# Patient Record
Sex: FEMALE | Race: WHITE | Employment: FULL TIME | URBAN - METROPOLITAN AREA
[De-identification: names, ages, dates, MRNs, and addresses within clinical notes are randomized per-mention and may not be internally consistent; named-entity substitution may affect disease eponyms.]

---

## 2017-01-05 ENCOUNTER — HOSPITAL ENCOUNTER (EMERGENCY)
Facility: HOSPITAL | Age: 27
Discharge: HOME | End: 2017-01-05
Payer: COMMERCIAL

## 2017-01-05 ENCOUNTER — OFFICE VISIT (OUTPATIENT)
Dept: FAMILY MEDICINE CLINIC | Facility: CLINIC | Age: 27
End: 2017-01-05

## 2017-01-05 ENCOUNTER — HOSPITAL ENCOUNTER (EMERGENCY)
Facility: HOSPITAL | Age: 27
Discharge: HOME | End: 2017-01-06
Attending: EMERGENCY MEDICINE
Payer: COMMERCIAL

## 2017-01-05 VITALS
TEMPERATURE: 98 F | OXYGEN SATURATION: 98 % | DIASTOLIC BLOOD PRESSURE: 78 MMHG | HEART RATE: 92 BPM | SYSTOLIC BLOOD PRESSURE: 122 MMHG | RESPIRATION RATE: 16 BRPM

## 2017-01-05 DIAGNOSIS — G43.009 MIGRAINE WITHOUT AURA AND WITHOUT STATUS MIGRAINOSUS, NOT INTRACTABLE: Primary | ICD-10-CM

## 2017-01-05 DIAGNOSIS — R51.9 INTRACTABLE HEADACHE, UNSPECIFIED CHRONICITY PATTERN, UNSPECIFIED HEADACHE TYPE: Primary | ICD-10-CM

## 2017-01-05 DIAGNOSIS — F41.9 ANXIETY: ICD-10-CM

## 2017-01-05 LAB
B-HCG UR QL: NEGATIVE
BILIRUB UR QL: NEGATIVE
COLOR UR: YELLOW
GLUCOSE UR-MCNC: NEGATIVE MG/DL
HGB UR QL STRIP.AUTO: NEGATIVE
KETONES UR-MCNC: NEGATIVE MG/DL
NITRITE UR QL STRIP.AUTO: NEGATIVE
PH UR: 6 [PH] (ref 5–8)
PROT UR-MCNC: NEGATIVE MG/DL
RBC #/AREA URNS AUTO: 1 /HPF
SP GR UR STRIP: 1.02 (ref 1–1.03)
UROBILINOGEN UR STRIP-ACNC: <2
VIT C UR-MCNC: 40 MG/DL
WBC #/AREA URNS AUTO: 1 /HPF

## 2017-01-05 PROCEDURE — 96375 TX/PRO/DX INJ NEW DRUG ADDON: CPT

## 2017-01-05 PROCEDURE — 81025 URINE PREGNANCY TEST: CPT

## 2017-01-05 PROCEDURE — 96374 THER/PROPH/DIAG INJ IV PUSH: CPT

## 2017-01-05 PROCEDURE — 81001 URINALYSIS AUTO W/SCOPE: CPT | Performed by: EMERGENCY MEDICINE

## 2017-01-05 PROCEDURE — 99285 EMERGENCY DEPT VISIT HI MDM: CPT

## 2017-01-05 PROCEDURE — 96361 HYDRATE IV INFUSION ADD-ON: CPT

## 2017-01-05 RX ORDER — DEXAMETHASONE SODIUM PHOSPHATE 10 MG/ML
10 INJECTION, SOLUTION INTRAMUSCULAR; INTRAVENOUS ONCE
Status: COMPLETED | OUTPATIENT
Start: 2017-01-05 | End: 2017-01-05

## 2017-01-05 RX ORDER — DIPHENHYDRAMINE HYDROCHLORIDE 50 MG/ML
25 INJECTION INTRAMUSCULAR; INTRAVENOUS ONCE
Status: COMPLETED | OUTPATIENT
Start: 2017-01-05 | End: 2017-01-05

## 2017-01-05 RX ORDER — KETOROLAC TROMETHAMINE 30 MG/ML
30 INJECTION, SOLUTION INTRAMUSCULAR; INTRAVENOUS ONCE
Status: COMPLETED | OUTPATIENT
Start: 2017-01-05 | End: 2017-01-05

## 2017-01-05 RX ORDER — METOCLOPRAMIDE HYDROCHLORIDE 5 MG/ML
10 INJECTION INTRAMUSCULAR; INTRAVENOUS ONCE
Status: COMPLETED | OUTPATIENT
Start: 2017-01-05 | End: 2017-01-05

## 2017-01-05 NOTE — ED NOTES
Patient updated and vitals rechecked. Patient states her headache remains the same at this time and has no further complaints. Made aware to let triage know if things change.

## 2017-01-05 NOTE — PROGRESS NOTES
Patient, Fredrick Mace , is a 23year old female who presented today in clinic with headache. Went to ED on 12/26/16 and headache resumed on 1/3/7. PT reports headache in frontal and occipital areas. No weakness or vision changes.  PT does report lig

## 2017-01-05 NOTE — ED INITIAL ASSESSMENT (HPI)
Pt reports having a HA for 22 days.  Pt states she had a ct scan which was negative and went to immediate care and sent here for a poss MRI

## 2017-01-06 ENCOUNTER — OFFICE VISIT (OUTPATIENT)
Dept: INTERNAL MEDICINE CLINIC | Facility: CLINIC | Age: 27
End: 2017-01-06

## 2017-01-06 VITALS
HEART RATE: 82 BPM | BODY MASS INDEX: 34.58 KG/M2 | DIASTOLIC BLOOD PRESSURE: 76 MMHG | SYSTOLIC BLOOD PRESSURE: 122 MMHG | OXYGEN SATURATION: 98 % | HEIGHT: 71 IN | WEIGHT: 247 LBS

## 2017-01-06 VITALS
WEIGHT: 242 LBS | SYSTOLIC BLOOD PRESSURE: 126 MMHG | TEMPERATURE: 98 F | DIASTOLIC BLOOD PRESSURE: 76 MMHG | BODY MASS INDEX: 32.78 KG/M2 | OXYGEN SATURATION: 99 % | HEART RATE: 78 BPM | HEIGHT: 72 IN | RESPIRATION RATE: 18 BRPM

## 2017-01-06 DIAGNOSIS — G44.229 CHRONIC TENSION-TYPE HEADACHE, NOT INTRACTABLE: Primary | ICD-10-CM

## 2017-01-06 DIAGNOSIS — F41.1 GENERALIZED ANXIETY DISORDER: ICD-10-CM

## 2017-01-06 PROCEDURE — 99213 OFFICE O/P EST LOW 20 MIN: CPT | Performed by: FAMILY MEDICINE

## 2017-01-06 RX ORDER — LORAZEPAM 1 MG/1
1 TABLET ORAL 2 TIMES DAILY PRN
Qty: 30 TABLET | Refills: 0 | Status: SHIPPED | OUTPATIENT
Start: 2017-01-06 | End: 2017-01-30

## 2017-01-06 RX ORDER — CETIRIZINE HYDROCHLORIDE 10 MG/1
TABLET ORAL
COMMUNITY
End: 2017-01-06 | Stop reason: ALTCHOICE

## 2017-01-06 RX ORDER — ATENOLOL 25 MG/1
25 TABLET ORAL 2 TIMES DAILY
Qty: 60 TABLET | Refills: 1 | Status: SHIPPED | OUTPATIENT
Start: 2017-01-06 | End: 2017-01-30

## 2017-01-06 NOTE — PROGRESS NOTES
CC:  Headache and Anxiety      Hx of CC:  RECURRENT SEVERE OCCIPITAL AND FRONTAL HEADACHES X 1 MONTHS WITH ANXIETY. HAD CT OF BRAIN AT [place] ON 12/26/16 WHICH WAS NORMAL. 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     Vitals

## 2017-01-06 NOTE — ED PROVIDER NOTES
Patient Seen in: [facility] AND [facility] Emergency Department    History   Patient presents with:  Headache (neurologic)    Stated Complaint: headache    HPI    23year old Female complains of headache   Location:  throbbing pain, global.  Quality: throbbing hydrochlorothiazide 25 MG Oral Tab,  TAKE 1 TABLET (25 MG TOTAL) BY MOUTH DAILY. Pantoprazole Sodium 40 MG Oral Tab EC,  Take 40 mg by mouth every morning before breakfast.   Probiotic Product (PROBIOTIC DAILY OR),  Take by mouth.    Multiple Vitamins-Min normal.   Mouth/Throat: Mucous membranes are normal. Mucous membranes are not pale and not cyanotic. Eyes: Conjunctivae and lids are normal. Right conjunctiva is not injected. Left conjunctiva is not injected. No scleral icterus.  Pupils are equal.   Neck 9194)         Procedures: None    Re-Evaluation: 1 AM, patient reports headache markedly improved after ED treatment and is comfortable going home     01/05/17  1701 01/05/17  1744 01/05/17  2218 01/05/17  2256   BP: 142/72 135/78 122/69 129/66   Pulse: 80 personally discussed the results of the above ED workup and a number of associated acute management issues with the patient, and I explained the need for further follow-up evaluation and treatment.     Risk:  Patient is at High risk of significant complicat

## 2017-01-09 ENCOUNTER — HOSPITAL ENCOUNTER (EMERGENCY)
Facility: HOSPITAL | Age: 27
Discharge: HOME | End: 2017-01-09
Attending: EMERGENCY MEDICINE
Payer: COMMERCIAL

## 2017-01-09 ENCOUNTER — TELEPHONE (OUTPATIENT)
Dept: GASTROENTEROLOGY | Facility: CLINIC | Age: 27
End: 2017-01-09

## 2017-01-09 ENCOUNTER — APPOINTMENT (OUTPATIENT)
Dept: CT IMAGING | Facility: HOSPITAL | Age: 27
End: 2017-01-09
Attending: EMERGENCY MEDICINE
Payer: COMMERCIAL

## 2017-01-09 VITALS
HEART RATE: 57 BPM | OXYGEN SATURATION: 100 % | WEIGHT: 243 LBS | BODY MASS INDEX: 32.91 KG/M2 | RESPIRATION RATE: 20 BRPM | TEMPERATURE: 99 F | HEIGHT: 72 IN | SYSTOLIC BLOOD PRESSURE: 114 MMHG | DIASTOLIC BLOOD PRESSURE: 62 MMHG

## 2017-01-09 DIAGNOSIS — R10.13 ABDOMINAL PAIN, EPIGASTRIC: Primary | ICD-10-CM

## 2017-01-09 LAB
ALBUMIN SERPL BCP-MCNC: 4.2 G/DL (ref 3.5–4.8)
ALP SERPL-CCNC: 44 U/L (ref 32–100)
ALT SERPL-CCNC: 23 U/L (ref 14–54)
ANION GAP SERPL CALC-SCNC: 11 MMOL/L (ref 0–18)
AST SERPL-CCNC: 25 U/L (ref 15–41)
B-HCG UR QL: NEGATIVE
BASOPHILS # BLD: 0 K/UL (ref 0–0.2)
BASOPHILS NFR BLD: 0 %
BILIRUB DIRECT SERPL-MCNC: 0.1 MG/DL (ref 0–0.2)
BILIRUB SERPL-MCNC: 0.3 MG/DL (ref 0.3–1.2)
BILIRUB UR QL: NEGATIVE
BUN SERPL-MCNC: 23 MG/DL (ref 8–20)
BUN/CREAT SERPL: 24.5 (ref 10–20)
CALCIUM SERPL-MCNC: 9.2 MG/DL (ref 8.5–10.5)
CHLORIDE SERPL-SCNC: 106 MMOL/L (ref 95–110)
CO2 SERPL-SCNC: 25 MMOL/L (ref 22–32)
COLOR UR: YELLOW
CREAT SERPL-MCNC: 0.94 MG/DL (ref 0.5–1.5)
EOSINOPHIL # BLD: 0.1 K/UL (ref 0–0.7)
EOSINOPHIL NFR BLD: 1 %
ERYTHROCYTE [DISTWIDTH] IN BLOOD BY AUTOMATED COUNT: 13.6 % (ref 11–15)
GLUCOSE SERPL-MCNC: 106 MG/DL (ref 70–99)
GLUCOSE UR-MCNC: NEGATIVE MG/DL
HCT VFR BLD AUTO: 41.1 % (ref 35–48)
HGB BLD-MCNC: 13.9 G/DL (ref 12–16)
KETONES UR-MCNC: NEGATIVE MG/DL
LIPASE SERPL-CCNC: 55 U/L (ref 22–51)
LYMPHOCYTES # BLD: 4.2 K/UL (ref 1–4)
LYMPHOCYTES NFR BLD: 47 %
MCH RBC QN AUTO: 29.9 PG (ref 27–32)
MCHC RBC AUTO-ENTMCNC: 33.8 G/DL (ref 32–37)
MCV RBC AUTO: 88.5 FL (ref 80–100)
MONOCYTES # BLD: 0.4 K/UL (ref 0–1)
MONOCYTES NFR BLD: 4 %
NEUTROPHILS # BLD AUTO: 4.3 K/UL (ref 1.8–7.7)
NEUTROPHILS NFR BLD: 48 %
NITRITE UR QL STRIP.AUTO: NEGATIVE
OSMOLALITY UR CALC.SUM OF ELEC: 298 MOSM/KG (ref 275–295)
PH UR: 6 [PH] (ref 5–8)
PLATELET # BLD AUTO: 198 K/UL (ref 140–400)
PMV BLD AUTO: 10.5 FL (ref 7.4–10.3)
POTASSIUM SERPL-SCNC: 3.3 MMOL/L (ref 3.3–5.1)
PROT SERPL-MCNC: 6.8 G/DL (ref 5.9–8.4)
PROT UR-MCNC: NEGATIVE MG/DL
RBC # BLD AUTO: 4.64 M/UL (ref 3.7–5.4)
RBC #/AREA URNS AUTO: 14 /HPF
SODIUM SERPL-SCNC: 142 MMOL/L (ref 136–144)
SP GR UR STRIP: 1.02 (ref 1–1.03)
UROBILINOGEN UR STRIP-ACNC: <2
VIT C UR-MCNC: 40 MG/DL
WBC # BLD AUTO: 9.1 K/UL (ref 4–11)
WBC #/AREA URNS AUTO: 7 /HPF

## 2017-01-09 PROCEDURE — 96375 TX/PRO/DX INJ NEW DRUG ADDON: CPT

## 2017-01-09 PROCEDURE — 80076 HEPATIC FUNCTION PANEL: CPT | Performed by: EMERGENCY MEDICINE

## 2017-01-09 PROCEDURE — 80048 BASIC METABOLIC PNL TOTAL CA: CPT | Performed by: EMERGENCY MEDICINE

## 2017-01-09 PROCEDURE — 96374 THER/PROPH/DIAG INJ IV PUSH: CPT

## 2017-01-09 PROCEDURE — 81001 URINALYSIS AUTO W/SCOPE: CPT | Performed by: EMERGENCY MEDICINE

## 2017-01-09 PROCEDURE — 74177 CT ABD & PELVIS W/CONTRAST: CPT

## 2017-01-09 PROCEDURE — 85025 COMPLETE CBC W/AUTO DIFF WBC: CPT | Performed by: EMERGENCY MEDICINE

## 2017-01-09 PROCEDURE — S0028 INJECTION, FAMOTIDINE, 20 MG: HCPCS | Performed by: EMERGENCY MEDICINE

## 2017-01-09 PROCEDURE — 87086 URINE CULTURE/COLONY COUNT: CPT | Performed by: EMERGENCY MEDICINE

## 2017-01-09 PROCEDURE — 99284 EMERGENCY DEPT VISIT MOD MDM: CPT

## 2017-01-09 PROCEDURE — 83690 ASSAY OF LIPASE: CPT | Performed by: EMERGENCY MEDICINE

## 2017-01-09 PROCEDURE — 96361 HYDRATE IV INFUSION ADD-ON: CPT

## 2017-01-09 PROCEDURE — 81025 URINE PREGNANCY TEST: CPT

## 2017-01-09 RX ORDER — SODIUM CHLORIDE 9 MG/ML
INJECTION, SOLUTION INTRAVENOUS ONCE
Status: COMPLETED | OUTPATIENT
Start: 2017-01-09 | End: 2017-01-09

## 2017-01-09 RX ORDER — LORAZEPAM 2 MG/ML
0.5 INJECTION INTRAMUSCULAR ONCE
Status: COMPLETED | OUTPATIENT
Start: 2017-01-09 | End: 2017-01-09

## 2017-01-09 RX ORDER — ONDANSETRON 2 MG/ML
4 INJECTION INTRAMUSCULAR; INTRAVENOUS ONCE
Status: COMPLETED | OUTPATIENT
Start: 2017-01-09 | End: 2017-01-09

## 2017-01-09 RX ORDER — FAMOTIDINE 10 MG/ML
20 INJECTION, SOLUTION INTRAVENOUS ONCE
Status: COMPLETED | OUTPATIENT
Start: 2017-01-09 | End: 2017-01-09

## 2017-01-09 RX ORDER — HYDROMORPHONE HYDROCHLORIDE 1 MG/ML
0.5 INJECTION, SOLUTION INTRAMUSCULAR; INTRAVENOUS; SUBCUTANEOUS EVERY 30 MIN PRN
Status: DISCONTINUED | OUTPATIENT
Start: 2017-01-09 | End: 2017-01-09

## 2017-01-09 NOTE — TELEPHONE ENCOUNTER
Pt states that she went to ER early today due to stomach pain. Pt was vomiting also. She was told that she needs to have EGD due to acid in stomach. Pt had CT done and also labs.   Pt already has EGD scheduledfor 1/10/17 and was told to call this office

## 2017-01-09 NOTE — ED INITIAL ASSESSMENT (HPI)
Pt states about 15 minutes PTA she suddenly had severe abdominal pain with associated shortness of breath. Pt was here not too long ago for the same.

## 2017-01-09 NOTE — ED PROVIDER NOTES
Patient Seen in: [facility] AND [facility] Emergency Department    History   Patient presents with:  Abdominal Pain    Stated Complaint: Abdominal pain    HPI    She presents with severe abdominal pain that came on just before coming in.   She reports that it sta Cream,  Use qhs for   Fluticasone Propionate (FLONASE) 50 MCG/ACT Nasal Suspension,  2 sprays by Each Nare route daily. cetirizine (ZYRTEC) 10 MG Oral Tab,  Take 10 mg by mouth daily.        Family History   Problem Relation Age of Onset   • Kidney Diseas murmur, regular, with good peripheral perfusion. Respiratory:  Lungs clear to auscultation bilaterally with good effort. No wheezes, ronchi, or rales. Abdomen:  Soft, she is tender across the upper abdomen in the epigastric area no rebound no guarding. Lymphocyte Absolute 4.2 (*)     All other components within normal limits   HEPATIC FUNCTION PANEL (7) - Normal   EMH POCT PREGNANCY URINE - Normal   CBC WITH DIFFERENTIAL WITH PLATELET    Narrative:      The following orders were created for panel order

## 2017-01-09 NOTE — TELEPHONE ENCOUNTER
I reviewed CT scan with radiology and looked at lab work.    Plan  - EGD tomorrow  - possible MRCP of bile ducts    RN to inform pt

## 2017-01-10 ENCOUNTER — OFFICE VISIT (OUTPATIENT)
Dept: NUTRITION/OBESITY MEDICINE | Facility: HOSPITAL | Age: 27
End: 2017-01-10
Attending: SURGERY
Payer: COMMERCIAL

## 2017-01-10 ENCOUNTER — LAB REQUISITION (OUTPATIENT)
Dept: LAB | Facility: HOSPITAL | Age: 27
End: 2017-01-10
Payer: COMMERCIAL

## 2017-01-10 VITALS
OXYGEN SATURATION: 97 % | HEART RATE: 61 BPM | SYSTOLIC BLOOD PRESSURE: 112 MMHG | BODY MASS INDEX: 34.21 KG/M2 | HEIGHT: 71 IN | WEIGHT: 244.38 LBS | DIASTOLIC BLOOD PRESSURE: 67 MMHG

## 2017-01-10 DIAGNOSIS — Z01.89 ENCOUNTER FOR OTHER SPECIFIED SPECIAL EXAMINATIONS: ICD-10-CM

## 2017-01-10 PROCEDURE — 88305 TISSUE EXAM BY PATHOLOGIST: CPT | Performed by: INTERNAL MEDICINE

## 2017-01-10 PROCEDURE — 88312 SPECIAL STAINS GROUP 1: CPT | Performed by: INTERNAL MEDICINE

## 2017-01-10 NOTE — PROGRESS NOTES
[address] [place] [address] AND WEIGHT LOSS CLINIC  [address] [place] [address]  Dept: [phone]  Loc: [phone]    1/10/2017    BARIATRIC EXISTING PATIENT/FOLLOW UP    HPI:  Thony Magaña is a 23year old-year old Plan for EGD and MRCP today. Patient will follow up with me in 1 month.     Mima Bourne MD  1/10/2017

## 2017-01-11 ENCOUNTER — TELEPHONE (OUTPATIENT)
Dept: GASTROENTEROLOGY | Facility: CLINIC | Age: 27
End: 2017-01-11

## 2017-01-11 DIAGNOSIS — R10.13 EPIGASTRIC PAIN: Primary | ICD-10-CM

## 2017-01-11 DIAGNOSIS — R93.3 ABNORMAL CT SCAN, GASTROINTESTINAL TRACT: ICD-10-CM

## 2017-01-11 NOTE — TELEPHONE ENCOUNTER
Pt returned my call and reviewed below. [facility] will work on approval for [facility] and will call her once authorized. Will call once pathology is back    Pt states there was discussion of increasing her pantoprazole to 40mg BID?  Please advise

## 2017-01-11 NOTE — TELEPHONE ENCOUNTER
RN to contact pt  - EGD results pending, may have samples back tonight  - I did discuss with Dr. Neisha Temple MRCP  - continue on PPI and carafate

## 2017-01-16 ENCOUNTER — TELEPHONE (OUTPATIENT)
Dept: INTERNAL MEDICINE CLINIC | Facility: CLINIC | Age: 27
End: 2017-01-16

## 2017-01-16 DIAGNOSIS — G43.919 INTRACTABLE MIGRAINE, UNSPECIFIED MIGRAINE TYPE: Primary | ICD-10-CM

## 2017-01-16 RX ORDER — SUMATRIPTAN 25 MG/1
TABLET, FILM COATED ORAL
COMMUNITY
End: 2017-01-30

## 2017-01-16 RX ORDER — FLUTICASONE PROPIONATE 50 MCG
SPRAY, SUSPENSION (ML) NASAL
COMMUNITY
End: 2017-01-30

## 2017-01-16 RX ORDER — SUCRALFATE ORAL 1 G/10ML
SUSPENSION ORAL
COMMUNITY
End: 2017-02-10

## 2017-01-16 RX ORDER — BUTALBITAL, ACETAMINOPHEN AND CAFFEINE 50; 325; 40 MG/1; MG/1; MG/1
TABLET ORAL
COMMUNITY
End: 2017-01-30

## 2017-01-16 RX ORDER — CYCLOBENZAPRINE HCL 10 MG
TABLET ORAL
COMMUNITY
End: 2017-01-30

## 2017-01-16 RX ORDER — PANTOPRAZOLE SODIUM 40 MG/1
40 GRANULE, DELAYED RELEASE ORAL 2 TIMES DAILY
COMMUNITY
End: 2017-05-25 | Stop reason: ALTCHOICE

## 2017-01-16 RX ORDER — TOPIRAMATE 25 MG/1
TABLET ORAL
COMMUNITY
End: 2017-01-30

## 2017-01-16 RX ORDER — CETIRIZINE HYDROCHLORIDE 10 MG/1
TABLET ORAL
COMMUNITY
End: 2017-01-30

## 2017-01-16 RX ORDER — HYDROCHLOROTHIAZIDE 25 MG/1
TABLET ORAL
COMMUNITY
End: 2017-01-20

## 2017-01-17 ENCOUNTER — OFFICE VISIT (OUTPATIENT)
Dept: NUTRITION/OBESITY MEDICINE | Facility: HOSPITAL | Age: 27
End: 2017-01-17
Attending: INTERNAL MEDICINE
Payer: COMMERCIAL

## 2017-01-17 VITALS
OXYGEN SATURATION: 99 % | HEART RATE: 99 BPM | HEIGHT: 71 IN | RESPIRATION RATE: 18 BRPM | SYSTOLIC BLOOD PRESSURE: 114 MMHG | BODY MASS INDEX: 34.66 KG/M2 | DIASTOLIC BLOOD PRESSURE: 59 MMHG | WEIGHT: 247.56 LBS

## 2017-01-17 DIAGNOSIS — Z98.890 S/P GASTROPLASTY: ICD-10-CM

## 2017-01-17 DIAGNOSIS — E87.8 ELECTROLYTE IMBALANCE: ICD-10-CM

## 2017-01-17 DIAGNOSIS — E66.9 OBESITY (BMI 30-39.9): ICD-10-CM

## 2017-01-17 DIAGNOSIS — E78.1 HYPERTRIGLYCERIDEMIA: ICD-10-CM

## 2017-01-17 DIAGNOSIS — R60.0 BILATERAL EDEMA OF LOWER EXTREMITY: Primary | ICD-10-CM

## 2017-01-17 DIAGNOSIS — G44.84 PRIMARY EXERTIONAL HEADACHE: ICD-10-CM

## 2017-01-17 PROCEDURE — 99213 OFFICE O/P EST LOW 20 MIN: CPT | Performed by: INTERNAL MEDICINE

## 2017-01-17 NOTE — TELEPHONE ENCOUNTER
Spoke patient the lorazepam does work for a short period of time but not complete relief, provided the info for Dr Chuy Cedeño

## 2017-01-17 NOTE — PROGRESS NOTES
[address] [place] [address] AND WEIGHT LOSS CLINIC  [address] [place] [address]  Dept: [phone]  Loc: [phone]    Date: 10/13/2016    Patient:  Haja Pham  :      6/15/1990  MRN:      [id number]    Glenna times daily as needed for Anxiety (OR SLEEP). , Disp: 30 tablet, Rfl: 0  •  sucralfate 1 GM/10ML Oral Suspension, Take 1 g by mouth 4 (four) times daily before meals and nightly., Disp: , Rfl:   •  hydrochlorothiazide 25 MG Oral Tab, TAKE 1 TABLET (25 MG TO CVA tenderness.   Lungs: diminished breath sounds bibasilar  Heart: S1, S2 normal, no murmur, click, rub or gallop, regular rate and rhythm  Abdomen: soft, incisions are healed well. large pannus noted  Extremities: extremities normal, atraumatic, no cyanos

## 2017-01-19 ENCOUNTER — TELEPHONE (OUTPATIENT)
Dept: NUTRITION/OBESITY MEDICINE | Facility: HOSPITAL | Age: 27
End: 2017-01-19

## 2017-01-20 RX ORDER — HYDROCHLOROTHIAZIDE 25 MG/1
25 TABLET ORAL DAILY
Qty: 30 TABLET | Refills: 1 | Status: SHIPPED | OUTPATIENT
Start: 2017-01-20 | End: 2017-01-30

## 2017-01-24 ENCOUNTER — HOSPITAL ENCOUNTER (OUTPATIENT)
Dept: MRI IMAGING | Facility: HOSPITAL | Age: 27
Discharge: HOME | End: 2017-01-24
Attending: INTERNAL MEDICINE
Payer: COMMERCIAL

## 2017-01-24 ENCOUNTER — TELEPHONE (OUTPATIENT)
Dept: INTERNAL MEDICINE CLINIC | Facility: CLINIC | Age: 27
End: 2017-01-24

## 2017-01-24 DIAGNOSIS — R10.13 EPIGASTRIC PAIN: ICD-10-CM

## 2017-01-24 DIAGNOSIS — R93.3 ABNORMAL CT SCAN, GASTROINTESTINAL TRACT: ICD-10-CM

## 2017-01-24 PROCEDURE — A9575 INJ GADOTERATE MEGLUMI 0.1ML: HCPCS | Performed by: INTERNAL MEDICINE

## 2017-01-24 PROCEDURE — 74183 MRI ABD W/O CNTR FLWD CNTR: CPT

## 2017-01-24 RX ORDER — HYDROCHLOROTHIAZIDE 25 MG/1
TABLET ORAL
Qty: 30 TABLET | Refills: 1 | Status: SHIPPED | OUTPATIENT
Start: 2017-01-24 | End: 2017-03-18

## 2017-01-24 RX ORDER — ONDANSETRON 4 MG/1
4 TABLET, ORALLY DISINTEGRATING ORAL EVERY 8 HOURS PRN
Qty: 20 TABLET | Refills: 0 | Status: SHIPPED | OUTPATIENT
Start: 2017-01-24 | End: 2017-01-30

## 2017-01-25 ENCOUNTER — TELEPHONE (OUTPATIENT)
Dept: GASTROENTEROLOGY | Facility: CLINIC | Age: 27
End: 2017-01-25

## 2017-01-25 NOTE — TELEPHONE ENCOUNTER
Dr. Arnulfo Damon - Pls see below. I spoke with pt; had MRI done yesterday. Pt c/o: nausea, diarrhea and \"feeling like she has been hit by a truck (aches)\". States it happened shortly after MRI and ONLY received IV contrast (not oral). Denies fever.    Pt stat

## 2017-01-25 NOTE — TELEPHONE ENCOUNTER
The patient was contacted, she did undergo the MRI yesterday. She reports feeling nauseous and had some retching episodes after the scan. She denies any rash, shortness of breath hives swelling or other issues.   She has started on Zofran for the symptoms

## 2017-01-25 NOTE — TELEPHONE ENCOUNTER
Phoned pharm and increased pantoprazole to 40mg BID. Phoned pt and scheduled follow up OV for Friday 2.10.17 at 3:00pm (pt unable to make sooner time).      GI RNs - Please call chelle to open slot for Friday 2.10.17 at 3:00pm for 15 min (follow up - M

## 2017-01-25 NOTE — TELEPHONE ENCOUNTER
Pt called, having nausea and diarrhea after MRI done today with contrast. States has had problems like this before with contrast. No trouble breathing/ hives or swelling. Will rx zofran to the pharmacy. Also to take benadryl.  If any swelling/ trouble breat

## 2017-01-25 NOTE — TELEPHONE ENCOUNTER
Pt has nausea since having scan with contrast done yesterday - asking if it is normal to get sick from the contrast

## 2017-02-08 ENCOUNTER — HOSPITAL ENCOUNTER (OUTPATIENT)
Dept: MRI IMAGING | Facility: HOSPITAL | Age: 27
Discharge: HOME | End: 2017-02-08
Attending: Other
Payer: COMMERCIAL

## 2017-02-08 DIAGNOSIS — M54.2 CERVICALGIA: ICD-10-CM

## 2017-02-08 DIAGNOSIS — G44.52 NEW PERSISTENT DAILY HEADACHE: ICD-10-CM

## 2017-02-08 PROCEDURE — 70553 MRI BRAIN STEM W/O & W/DYE: CPT

## 2017-02-08 PROCEDURE — 72141 MRI NECK SPINE W/O DYE: CPT

## 2017-02-08 PROCEDURE — A9575 INJ GADOTERATE MEGLUMI 0.1ML: HCPCS | Performed by: OTHER

## 2017-02-09 NOTE — PROGRESS NOTES
Quick Note:    Mild neural foraminal stenosis at C6-7 on the left. Mild syrinx at C7. These findings may or may not be clinically significant.  There is spinal cord itself appears to otherwise be normal.  ______

## 2017-02-09 NOTE — PROGRESS NOTES
Quick Note:    Normal MRI brain. Evidence of sinus inflammation. The patient made benefit from returning to her primary care physician or seeing the ear nose and throat doctors.   ______

## 2017-02-10 ENCOUNTER — OFFICE VISIT (OUTPATIENT)
Dept: GASTROENTEROLOGY | Facility: CLINIC | Age: 27
End: 2017-02-10

## 2017-02-10 ENCOUNTER — TELEPHONE (OUTPATIENT)
Dept: GASTROENTEROLOGY | Facility: CLINIC | Age: 27
End: 2017-02-10

## 2017-02-10 VITALS
HEIGHT: 71.75 IN | BODY MASS INDEX: 33.52 KG/M2 | WEIGHT: 244.81 LBS | HEART RATE: 68 BPM | SYSTOLIC BLOOD PRESSURE: 110 MMHG | DIASTOLIC BLOOD PRESSURE: 69 MMHG

## 2017-02-10 DIAGNOSIS — R93.5 ABNORMAL FINDINGS ON DIAGNOSTIC IMAGING OF ABDOMEN: ICD-10-CM

## 2017-02-10 DIAGNOSIS — R10.9 ABDOMINAL PAIN, UNSPECIFIED LOCATION: Primary | ICD-10-CM

## 2017-02-10 PROCEDURE — 99213 OFFICE O/P EST LOW 20 MIN: CPT | Performed by: INTERNAL MEDICINE

## 2017-02-10 PROCEDURE — 99212 OFFICE O/P EST SF 10 MIN: CPT | Performed by: INTERNAL MEDICINE

## 2017-02-10 NOTE — PATIENT INSTRUCTIONS
Bile duct dilation-   - Evaluation by Dr. Jim Pritchett for possible EUS  - consider sphincter of Oddi or passage of small sludge/stones

## 2017-02-11 ENCOUNTER — TELEPHONE (OUTPATIENT)
Dept: INTERNAL MEDICINE CLINIC | Facility: CLINIC | Age: 27
End: 2017-02-11

## 2017-02-14 RX ORDER — SUCRALFATE 1 G/10ML
SUSPENSION ORAL
Qty: 1200 ML | Refills: 2 | OUTPATIENT
Start: 2017-02-14

## 2017-02-14 NOTE — TELEPHONE ENCOUNTER
Pending Prescriptions Disp Refills    CARAFATE 1 GM/10ML Oral Suspension [Pharmacy Med Name: CARAFATE 1 GM/10 ML SUSP] 1200 mL 2     Sig: TAKE 10ML BY MOUTH EVERY 6 HOURS         See refill request  Patient last seen 2-10-17.    Medication last refilled 1

## 2017-02-17 ENCOUNTER — OFFICE VISIT (OUTPATIENT)
Dept: OTOLARYNGOLOGY | Facility: CLINIC | Age: 27
End: 2017-02-17

## 2017-02-17 VITALS
TEMPERATURE: 98 F | DIASTOLIC BLOOD PRESSURE: 70 MMHG | HEIGHT: 72 IN | SYSTOLIC BLOOD PRESSURE: 112 MMHG | BODY MASS INDEX: 32.51 KG/M2 | WEIGHT: 240 LBS

## 2017-02-17 DIAGNOSIS — J32.9 CHRONIC SINUSITIS, UNSPECIFIED LOCATION: Primary | ICD-10-CM

## 2017-02-17 PROCEDURE — 99213 OFFICE O/P EST LOW 20 MIN: CPT | Performed by: OTOLARYNGOLOGY

## 2017-02-17 PROCEDURE — 99212 OFFICE O/P EST SF 10 MIN: CPT | Performed by: OTOLARYNGOLOGY

## 2017-02-17 RX ORDER — SUCRALFATE 1 G/10ML
SUSPENSION ORAL
Qty: 1200 ML | Refills: 2 | Status: SHIPPED | OUTPATIENT
Start: 2017-02-17 | End: 2017-07-27

## 2017-02-17 NOTE — PATIENT INSTRUCTIONS
Chronic Sinusitis  Chronic sinusitis is a long-term swelling or infection of the sinuses. If sinusitis lasts more than 12 weeks, it is called chronic. What is chronic sinusitis? Sinuses are air-filled spaces in the skull behind the face.  They are kep · Taking medicines. Your doctor may prescribe medicines to reduce the amount of mucus and swelling. These help unblock the sinuses and allow them to drain. You will need to take antibiotics if you have a bacterial infection. · Flushing your sinuses.  Your

## 2017-02-17 NOTE — PROGRESS NOTES
Bambi Maxwell is a 23year old female. Patient presents with:  Sinus Problem: sinus headache since december 2016, had MRI brain on 2/17    HPI:   She's been experiencing back headaches and back of her head and the front.  This is been going on for the las chills, sweats, weight loss, weight gain  SKIN: denies any unusual skin lesions or rashes  RESPIRATORY: denies shortness of breath with exertion  NEURO: denies headaches    EXAM:   /70 mmHg  Temp(Src) 97.6 °F (36.4 °C) (Tympanic)  Ht 6' (1.829 m)  [employer]

## 2017-02-20 ENCOUNTER — OFFICE VISIT (OUTPATIENT)
Dept: SURGERY | Facility: CLINIC | Age: 27
End: 2017-02-20
Attending: INTERNAL MEDICINE

## 2017-02-20 VITALS
BODY MASS INDEX: 32.4 KG/M2 | WEIGHT: 239.25 LBS | HEART RATE: 78 BPM | DIASTOLIC BLOOD PRESSURE: 77 MMHG | HEIGHT: 72 IN | SYSTOLIC BLOOD PRESSURE: 121 MMHG | RESPIRATION RATE: 18 BRPM

## 2017-02-20 DIAGNOSIS — E66.9 OBESITY (BMI 30-39.9): ICD-10-CM

## 2017-02-20 DIAGNOSIS — E87.8 ELECTROLYTE IMBALANCE: ICD-10-CM

## 2017-02-20 DIAGNOSIS — Z98.890 S/P GASTROPLASTY: ICD-10-CM

## 2017-02-20 DIAGNOSIS — K21.00 GASTROESOPHAGEAL REFLUX DISEASE WITH ESOPHAGITIS: ICD-10-CM

## 2017-02-20 DIAGNOSIS — G44.84 PRIMARY EXERTIONAL HEADACHE: Primary | ICD-10-CM

## 2017-02-20 DIAGNOSIS — F43.9 STRESS: ICD-10-CM

## 2017-02-20 PROBLEM — K21.9 GERD (GASTROESOPHAGEAL REFLUX DISEASE): Status: ACTIVE | Noted: 2017-02-20

## 2017-02-20 PROCEDURE — 99213 OFFICE O/P EST LOW 20 MIN: CPT | Performed by: INTERNAL MEDICINE

## 2017-02-20 RX ORDER — BUPROPION HYDROCHLORIDE 100 MG/1
100 TABLET ORAL 2 TIMES DAILY
Qty: 60 TABLET | Refills: 2 | Status: SHIPPED | OUTPATIENT
Start: 2017-02-20 | End: 2017-06-14

## 2017-02-20 NOTE — PROGRESS NOTES
[facility] AND WEIGHT LOSS CLINIC  [address] [place] [address]  Dept: [phone]  Loc: [phone]    Date: 10/13/2016    Patient:  Akash Brown  :      6/15/1990  MRN:      [id number]    Eleazar WATTS by Each Nare route daily. , Disp: 1 Inhaler, Rfl: 1  •  cetirizine (ZYRTEC) 10 MG Oral Tab, Take 10 mg by mouth daily. , Disp: , Rfl:     Allergies:  Dust; Keflex;  Lexapro; Penicillins; Sulfa Antibiotics     Social History:    Smoking Status: Never Smoker diagnosis)  Gastroesophageal reflux disease with esophagitis  Electrolyte imbalance  S/p gastroplasty  Obesity (bmi 30-39. 9)  Stress    Plan     S/P VSG  Feels weak      Headache x several weeks      Continue water and protein intake    Back to work    Kasey Duval

## 2017-02-22 NOTE — PROGRESS NOTES
Ramu Knight is a 23year old female. HPI:   Patient presents with: Follow - Up      The patient follows up in the office today. Fortunately the severe attacks have been fairly limited.   She describes severe abdominal pain in the upper abdomen epig Each Nare route daily. Disp: 1 Inhaler Rfl: 1   cetirizine (ZYRTEC) 10 MG Oral Tab Take 10 mg by mouth daily. Disp:  Rfl:    BuPROPion HCl (WELLBUTRIN) 100 MG Oral Tab Take 1 tablet (100 mg total) by mouth 2 (two) times daily.  Disp: 60 tablet Rfl: 2   FRANCES prescriptions requested or ordered in this encounter    Imaging & Referrals:  None     2/22/2017  Clark Jacome.  Abril Nicole MD

## 2017-02-27 ENCOUNTER — TELEPHONE (OUTPATIENT)
Dept: OTOLARYNGOLOGY | Facility: CLINIC | Age: 27
End: 2017-02-27

## 2017-02-27 NOTE — TELEPHONE ENCOUNTER
Pt states antibiotic prescribed on 02/17 has not helped with her headache, pt requesting to speak to RN. Pls call thank you.

## 2017-02-27 NOTE — TELEPHONE ENCOUNTER
pt seen 02/17 for chronic sinusitis and headaches. Pt prescribed clarithromycin for 14 days, pt using Flonase daily and pt brought neti pot which she is using daily but her symptoms have worsened.  Per pt still having headaches, sinus pressure fron

## 2017-03-07 ENCOUNTER — TELEPHONE (OUTPATIENT)
Dept: SURGERY | Facility: CLINIC | Age: 27
End: 2017-03-07

## 2017-03-09 ENCOUNTER — ANESTHESIA EVENT (OUTPATIENT)
Dept: ENDOSCOPY | Facility: HOSPITAL | Age: 27
End: 2017-03-09
Payer: COMMERCIAL

## 2017-03-09 ENCOUNTER — ANESTHESIA (OUTPATIENT)
Dept: ENDOSCOPY | Facility: HOSPITAL | Age: 27
End: 2017-03-09
Payer: COMMERCIAL

## 2017-03-09 ENCOUNTER — HOSPITAL ENCOUNTER (OUTPATIENT)
Facility: HOSPITAL | Age: 27
Setting detail: HOSPITAL OUTPATIENT SURGERY
Discharge: HOME | End: 2017-03-09
Attending: INTERNAL MEDICINE | Admitting: INTERNAL MEDICINE
Payer: COMMERCIAL

## 2017-03-09 ENCOUNTER — SURGERY (OUTPATIENT)
Age: 27
End: 2017-03-09

## 2017-03-09 VITALS
OXYGEN SATURATION: 100 % | BODY MASS INDEX: 31.15 KG/M2 | HEIGHT: 72 IN | WEIGHT: 230 LBS | HEART RATE: 58 BPM | DIASTOLIC BLOOD PRESSURE: 47 MMHG | RESPIRATION RATE: 14 BRPM | SYSTOLIC BLOOD PRESSURE: 108 MMHG

## 2017-03-09 DIAGNOSIS — K83.8 DILATED BILE DUCT: ICD-10-CM

## 2017-03-09 DIAGNOSIS — K80.20 CALCULUS OF GALLBLADDER WITHOUT CHOLECYSTITIS WITHOUT OBSTRUCTION: Primary | ICD-10-CM

## 2017-03-09 LAB — B-HCG UR QL: NEGATIVE

## 2017-03-09 PROCEDURE — 88305 TISSUE EXAM BY PATHOLOGIST: CPT | Performed by: INTERNAL MEDICINE

## 2017-03-09 PROCEDURE — 0DJ08ZZ INSPECTION OF UPPER INTESTINAL TRACT, VIA NATURAL OR ARTIFICIAL OPENING ENDOSCOPIC: ICD-10-PCS | Performed by: INTERNAL MEDICINE

## 2017-03-09 PROCEDURE — 88312 SPECIAL STAINS GROUP 1: CPT | Performed by: INTERNAL MEDICINE

## 2017-03-09 PROCEDURE — 81025 URINE PREGNANCY TEST: CPT

## 2017-03-09 PROCEDURE — 0DB98ZX EXCISION OF DUODENUM, VIA NATURAL OR ARTIFICIAL OPENING ENDOSCOPIC, DIAGNOSTIC: ICD-10-PCS | Performed by: INTERNAL MEDICINE

## 2017-03-09 RX ORDER — NALOXONE HYDROCHLORIDE 0.4 MG/ML
80 INJECTION, SOLUTION INTRAMUSCULAR; INTRAVENOUS; SUBCUTANEOUS AS NEEDED
Status: DISCONTINUED | OUTPATIENT
Start: 2017-03-09 | End: 2017-03-09

## 2017-03-09 RX ORDER — MORPHINE SULFATE 2 MG/ML
2 INJECTION, SOLUTION INTRAMUSCULAR; INTRAVENOUS EVERY 10 MIN PRN
Status: DISCONTINUED | OUTPATIENT
Start: 2017-03-09 | End: 2017-03-09

## 2017-03-09 RX ORDER — MORPHINE SULFATE 10 MG/ML
6 INJECTION, SOLUTION INTRAMUSCULAR; INTRAVENOUS EVERY 10 MIN PRN
Status: DISCONTINUED | OUTPATIENT
Start: 2017-03-09 | End: 2017-03-09

## 2017-03-09 RX ORDER — HYDROCODONE BITARTRATE AND ACETAMINOPHEN 5; 325 MG/1; MG/1
1 TABLET ORAL AS NEEDED
Status: DISCONTINUED | OUTPATIENT
Start: 2017-03-09 | End: 2017-03-09

## 2017-03-09 RX ORDER — SODIUM CHLORIDE, SODIUM LACTATE, POTASSIUM CHLORIDE, CALCIUM CHLORIDE 600; 310; 30; 20 MG/100ML; MG/100ML; MG/100ML; MG/100ML
INJECTION, SOLUTION INTRAVENOUS CONTINUOUS
Status: DISCONTINUED | OUTPATIENT
Start: 2017-03-09 | End: 2017-03-09

## 2017-03-09 RX ORDER — HYDROMORPHONE HYDROCHLORIDE 1 MG/ML
0.6 INJECTION, SOLUTION INTRAMUSCULAR; INTRAVENOUS; SUBCUTANEOUS EVERY 5 MIN PRN
Status: DISCONTINUED | OUTPATIENT
Start: 2017-03-09 | End: 2017-03-09

## 2017-03-09 RX ORDER — HYDROCODONE BITARTRATE AND ACETAMINOPHEN 5; 325 MG/1; MG/1
2 TABLET ORAL AS NEEDED
Status: DISCONTINUED | OUTPATIENT
Start: 2017-03-09 | End: 2017-03-09

## 2017-03-09 RX ORDER — ONDANSETRON 2 MG/ML
INJECTION INTRAMUSCULAR; INTRAVENOUS AS NEEDED
Status: DISCONTINUED | OUTPATIENT
Start: 2017-03-09 | End: 2017-03-09 | Stop reason: SURG

## 2017-03-09 RX ORDER — HYDROMORPHONE HYDROCHLORIDE 1 MG/ML
0.4 INJECTION, SOLUTION INTRAMUSCULAR; INTRAVENOUS; SUBCUTANEOUS EVERY 5 MIN PRN
Status: DISCONTINUED | OUTPATIENT
Start: 2017-03-09 | End: 2017-03-09

## 2017-03-09 RX ORDER — LIDOCAINE HYDROCHLORIDE 10 MG/ML
INJECTION, SOLUTION EPIDURAL; INFILTRATION; INTRACAUDAL; PERINEURAL AS NEEDED
Status: DISCONTINUED | OUTPATIENT
Start: 2017-03-09 | End: 2017-03-09 | Stop reason: SURG

## 2017-03-09 RX ORDER — MORPHINE SULFATE 4 MG/ML
4 INJECTION, SOLUTION INTRAMUSCULAR; INTRAVENOUS EVERY 10 MIN PRN
Status: DISCONTINUED | OUTPATIENT
Start: 2017-03-09 | End: 2017-03-09

## 2017-03-09 RX ORDER — ONDANSETRON 2 MG/ML
4 INJECTION INTRAMUSCULAR; INTRAVENOUS ONCE AS NEEDED
Status: DISCONTINUED | OUTPATIENT
Start: 2017-03-09 | End: 2017-03-09

## 2017-03-09 RX ORDER — HALOPERIDOL 5 MG/ML
0.25 INJECTION INTRAMUSCULAR ONCE AS NEEDED
Status: DISCONTINUED | OUTPATIENT
Start: 2017-03-09 | End: 2017-03-09

## 2017-03-09 RX ORDER — SODIUM CHLORIDE, SODIUM LACTATE, POTASSIUM CHLORIDE, CALCIUM CHLORIDE 600; 310; 30; 20 MG/100ML; MG/100ML; MG/100ML; MG/100ML
INJECTION, SOLUTION INTRAVENOUS CONTINUOUS PRN
Status: DISCONTINUED | OUTPATIENT
Start: 2017-03-09 | End: 2017-03-09 | Stop reason: SURG

## 2017-03-09 RX ORDER — HYDROMORPHONE HYDROCHLORIDE 1 MG/ML
0.2 INJECTION, SOLUTION INTRAMUSCULAR; INTRAVENOUS; SUBCUTANEOUS EVERY 5 MIN PRN
Status: DISCONTINUED | OUTPATIENT
Start: 2017-03-09 | End: 2017-03-09

## 2017-03-09 RX ADMIN — SODIUM CHLORIDE, SODIUM LACTATE, POTASSIUM CHLORIDE, CALCIUM CHLORIDE: 600; 310; 30; 20 INJECTION, SOLUTION INTRAVENOUS at 15:29:00

## 2017-03-09 RX ADMIN — SODIUM CHLORIDE, SODIUM LACTATE, POTASSIUM CHLORIDE, CALCIUM CHLORIDE: 600; 310; 30; 20 INJECTION, SOLUTION INTRAVENOUS at 14:55:00

## 2017-03-09 RX ADMIN — LIDOCAINE HYDROCHLORIDE 50 MG: 10 INJECTION, SOLUTION EPIDURAL; INFILTRATION; INTRACAUDAL; PERINEURAL at 14:59:00

## 2017-03-09 RX ADMIN — ONDANSETRON 4 MG: 2 INJECTION INTRAMUSCULAR; INTRAVENOUS at 15:28:00

## 2017-03-09 NOTE — ANESTHESIA POSTPROCEDURE EVALUATION
Patient: Zetta Floor    Procedure Summary     Date Anesthesia Start Anesthesia Stop Room / Location    03/09/17 3125 3436 [facility] ENDOSCOPY 01 / [facility] ENDOSCOPY       Procedure Diagnosis Surgeon Responsible Provider    ENDOSCOPIC ULTRASOUND (EUS) (N/A ); ESO

## 2017-03-09 NOTE — ANESTHESIA PREPROCEDURE EVALUATION
Anesthesia PreOp Note    HPI:     Ibeth Avalos is a 23year old female who presents for preoperative consultation requested by: Bonifacio Rubin MD    Date of Surgery: 3/9/2017    Procedure(s):  ENDOSCOPIC ULTRASOUND (EUS)  ESOPHAGOGASTRODUODENOSCOPY (E Prescriptions prior to admission:  BuPROPion HCl (WELLBUTRIN) 100 MG Oral Tab Take 1 tablet (100 mg total) by mouth 2 (two) times daily.  Disp: 60 tablet Rfl: 2 3/9/2017   CARAFATE 1 GM/10ML Oral Suspension TAKE 10ML BY MOUTH EVERY 6 HOURS Disp: 120 Activity: Yes    Partners: Male     Other Topics Concern    Pt has a pacemaker No    Pt has a defibrillator No    Reaction to local anesthetic No     Social History Narrative       Available pre-op labs reviewed.     Lab Results  Component Value Date   WBC the anesthetic management as planned.   Megan Gomes  3/9/2017 2:31 PM

## 2017-03-09 NOTE — BRIEF OP NOTE
[facility] ENDOSCOPY LAB SUITES  Brief Op Note     Peg Goring Location: OR   CSN [id number] MRN [id number]   Admission Date 3/9/2017 Operation Date 3/9/2017   Attending Physician Judd Reilly MD Operating Physician Parviz Méndez MD       P

## 2017-03-09 NOTE — OPERATIVE REPORT
[facility]    PATIENT'S NAME: Lanialexandria Bob   ATTENDING PHYSICIAN: Freda Ireland MD   OPERATING PHYSICIAN: Freda Ireland MD   PATIENT ACCOUNT#:   [id number]    LOCATION:  [facility] ROOM 2 [facility] 10  MEDICAL RECORD #:   [id number] duodenum. Exam of the esophageal mucosa did not reveal any evidence of inflammation or ulceration. The squamocolumnar junction was at approximately 39.5 cm from incisors.   The diaphragmatic impression could not be assessed completely; however, it might h

## 2017-03-11 ENCOUNTER — OFFICE VISIT (OUTPATIENT)
Dept: OTOLARYNGOLOGY | Facility: CLINIC | Age: 27
End: 2017-03-11

## 2017-03-11 VITALS
DIASTOLIC BLOOD PRESSURE: 80 MMHG | WEIGHT: 233 LBS | BODY MASS INDEX: 32 KG/M2 | SYSTOLIC BLOOD PRESSURE: 110 MMHG | TEMPERATURE: 97 F

## 2017-03-11 DIAGNOSIS — J32.9 CHRONIC SINUSITIS, UNSPECIFIED LOCATION: Primary | ICD-10-CM

## 2017-03-11 PROCEDURE — 99213 OFFICE O/P EST LOW 20 MIN: CPT | Performed by: OTOLARYNGOLOGY

## 2017-03-11 PROCEDURE — 99212 OFFICE O/P EST SF 10 MIN: CPT | Performed by: OTOLARYNGOLOGY

## 2017-03-11 NOTE — PROGRESS NOTES
Graham Yanes is a 23year old female. Patient presents with:  Sinusitis: Pt experience headaches, and sinus pressure getting worst.    HPI:   She's had no real improvement with her courses of medications.      Current Outpatient Prescriptions:  BuPROPion °F (36.2 °C)  Wt 233 lb (105.688 kg)  System Findings Details   Skin Normal Inspection - Normal.   Constitutional Normal Overall appearance - Normal.   Head/Face Normal Facial features - Normal. Eyebrows - Normal. Skull - Normal.   Oral/Oropharynx Normal L

## 2017-03-15 ENCOUNTER — TELEPHONE (OUTPATIENT)
Dept: OTOLARYNGOLOGY | Facility: CLINIC | Age: 27
End: 2017-03-15

## 2017-03-15 NOTE — PROGRESS NOTES
Quick Note:    3/14/2017  [address]    Dear Philipp Pulliam,       Here are the biopsy/pathology findings from your recent EGD (Upper Endoscopy) and endoscopic ultrasoundk:  1. Gallstones  2.  Dilated bile duct without e

## 2017-03-15 NOTE — TELEPHONE ENCOUNTER
[facility], [phone], spoke with Lyric Roberto, a prior authorization is not needed for CT sinus, reference number [id number].  Called secondary insurance, Mark, [phone], spoke with Zoe Miller, case started, case number [id number], clinicals faxed to

## 2017-03-20 ENCOUNTER — HOSPITAL ENCOUNTER (OUTPATIENT)
Dept: ULTRASOUND IMAGING | Facility: HOSPITAL | Age: 27
Discharge: HOME | End: 2017-03-20
Attending: UROLOGY
Payer: COMMERCIAL

## 2017-03-20 DIAGNOSIS — N20.0 KIDNEY STONES: ICD-10-CM

## 2017-03-20 PROCEDURE — 76770 US EXAM ABDO BACK WALL COMP: CPT

## 2017-03-20 NOTE — TELEPHONE ENCOUNTER
LMTCB-Per pt insurance conventry, pt prior authorization approved [id number] and expires 04/15/17, at [facility].

## 2017-03-22 ENCOUNTER — HOSPITAL ENCOUNTER (OUTPATIENT)
Dept: CT IMAGING | Facility: HOSPITAL | Age: 27
Discharge: HOME | End: 2017-03-22
Attending: OTOLARYNGOLOGY
Payer: COMMERCIAL

## 2017-03-22 DIAGNOSIS — J32.9 CHRONIC SINUSITIS, UNSPECIFIED LOCATION: ICD-10-CM

## 2017-03-22 PROCEDURE — 70486 CT MAXILLOFACIAL W/O DYE: CPT

## 2017-03-22 RX ORDER — HYDROCHLOROTHIAZIDE 25 MG/1
TABLET ORAL
Qty: 30 TABLET | Refills: 1 | Status: SHIPPED | OUTPATIENT
Start: 2017-03-22 | End: 2017-05-15

## 2017-03-24 ENCOUNTER — TELEPHONE (OUTPATIENT)
Dept: OTOLARYNGOLOGY | Facility: CLINIC | Age: 27
End: 2017-03-24

## 2017-04-01 ENCOUNTER — TELEPHONE (OUTPATIENT)
Dept: INTERNAL MEDICINE CLINIC | Facility: CLINIC | Age: 27
End: 2017-04-01

## 2017-04-01 ENCOUNTER — HOSPITAL ENCOUNTER (EMERGENCY)
Facility: HOSPITAL | Age: 27
Discharge: HOME | End: 2017-04-01
Attending: EMERGENCY MEDICINE
Payer: COMMERCIAL

## 2017-04-01 ENCOUNTER — APPOINTMENT (OUTPATIENT)
Dept: CT IMAGING | Facility: HOSPITAL | Age: 27
End: 2017-04-01
Attending: EMERGENCY MEDICINE
Payer: COMMERCIAL

## 2017-04-01 ENCOUNTER — APPOINTMENT (OUTPATIENT)
Dept: ULTRASOUND IMAGING | Facility: HOSPITAL | Age: 27
End: 2017-04-01
Attending: EMERGENCY MEDICINE
Payer: COMMERCIAL

## 2017-04-01 VITALS
RESPIRATION RATE: 18 BRPM | HEART RATE: 55 BPM | DIASTOLIC BLOOD PRESSURE: 48 MMHG | BODY MASS INDEX: 31.29 KG/M2 | OXYGEN SATURATION: 98 % | WEIGHT: 231 LBS | TEMPERATURE: 99 F | HEIGHT: 72 IN | SYSTOLIC BLOOD PRESSURE: 104 MMHG

## 2017-04-01 DIAGNOSIS — K80.50 BILIARY COLIC: ICD-10-CM

## 2017-04-01 DIAGNOSIS — N30.00 ACUTE CYSTITIS WITHOUT HEMATURIA: Primary | ICD-10-CM

## 2017-04-01 PROCEDURE — 87086 URINE CULTURE/COLONY COUNT: CPT

## 2017-04-01 PROCEDURE — 76705 ECHO EXAM OF ABDOMEN: CPT

## 2017-04-01 PROCEDURE — 96375 TX/PRO/DX INJ NEW DRUG ADDON: CPT

## 2017-04-01 PROCEDURE — 80076 HEPATIC FUNCTION PANEL: CPT | Performed by: EMERGENCY MEDICINE

## 2017-04-01 PROCEDURE — 80048 BASIC METABOLIC PNL TOTAL CA: CPT | Performed by: EMERGENCY MEDICINE

## 2017-04-01 PROCEDURE — 96376 TX/PRO/DX INJ SAME DRUG ADON: CPT

## 2017-04-01 PROCEDURE — 96361 HYDRATE IV INFUSION ADD-ON: CPT

## 2017-04-01 PROCEDURE — 85025 COMPLETE CBC W/AUTO DIFF WBC: CPT | Performed by: EMERGENCY MEDICINE

## 2017-04-01 PROCEDURE — 81025 URINE PREGNANCY TEST: CPT

## 2017-04-01 PROCEDURE — 83690 ASSAY OF LIPASE: CPT | Performed by: EMERGENCY MEDICINE

## 2017-04-01 PROCEDURE — 81001 URINALYSIS AUTO W/SCOPE: CPT

## 2017-04-01 PROCEDURE — 74176 CT ABD & PELVIS W/O CONTRAST: CPT

## 2017-04-01 PROCEDURE — 99285 EMERGENCY DEPT VISIT HI MDM: CPT

## 2017-04-01 PROCEDURE — 96365 THER/PROPH/DIAG IV INF INIT: CPT

## 2017-04-01 RX ORDER — MORPHINE SULFATE 4 MG/ML
4 INJECTION, SOLUTION INTRAMUSCULAR; INTRAVENOUS ONCE
Status: COMPLETED | OUTPATIENT
Start: 2017-04-01 | End: 2017-04-01

## 2017-04-01 RX ORDER — HYDROCODONE BITARTRATE AND ACETAMINOPHEN 7.5; 325 MG/1; MG/1
1 TABLET ORAL EVERY 4 HOURS PRN
Qty: 20 TABLET | Refills: 0 | Status: SHIPPED | OUTPATIENT
Start: 2017-04-01 | End: 2017-04-08

## 2017-04-01 RX ORDER — LEVOFLOXACIN 5 MG/ML
500 INJECTION, SOLUTION INTRAVENOUS ONCE
Status: COMPLETED | OUTPATIENT
Start: 2017-04-01 | End: 2017-04-01

## 2017-04-01 RX ORDER — MORPHINE SULFATE 4 MG/ML
INJECTION, SOLUTION INTRAMUSCULAR; INTRAVENOUS
Status: DISCONTINUED
Start: 2017-04-01 | End: 2017-04-01

## 2017-04-01 RX ORDER — SODIUM CHLORIDE 9 MG/ML
INJECTION, SOLUTION INTRAVENOUS ONCE
Status: COMPLETED | OUTPATIENT
Start: 2017-04-01 | End: 2017-04-01

## 2017-04-01 RX ORDER — LEVOFLOXACIN 500 MG/1
500 TABLET, FILM COATED ORAL DAILY
Qty: 10 TABLET | Refills: 0 | Status: SHIPPED | OUTPATIENT
Start: 2017-04-01 | End: 2017-04-11

## 2017-04-01 RX ORDER — ONDANSETRON 2 MG/ML
4 INJECTION INTRAMUSCULAR; INTRAVENOUS ONCE
Status: COMPLETED | OUTPATIENT
Start: 2017-04-01 | End: 2017-04-01

## 2017-04-01 NOTE — ED PROVIDER NOTES
Patient Seen in: [facility] AND [facility] Emergency Department    History   Patient presents with:  Abdomen/Flank Pain (GI/)    Stated Complaint: RUQ abd pain, +gallstones    HPI    Patient presents the emergency department complaining of right upper quadrant OR),  Take by mouth. Fluticasone Propionate (FLONASE) 50 MCG/ACT Nasal Suspension,  2 sprays by Each Nare route daily. cetirizine (ZYRTEC) 10 MG Oral Tab,  Take 10 mg by mouth daily.        Family History   Problem Relation Age of Onset   • Kidney Disea dry. No rash noted. Nursing note and vitals reviewed.           ED Course     Labs Reviewed   URINALYSIS WITH CULTURE REFLEX - Abnormal; Notable for the following:     Clarity Urine Cloudy (*)     Protein Urine 100  (*)     Blood Urine Moderate (*)     Jian Manzo on Tuesday and follow-up.   Disposition and Plan     Clinical Impression:  Acute cystitis without hematuria  (primary encounter diagnosis)  Biliary colic    Disposition:  Discharge    Follow-up:  Marline Willett MD  [address] [address]

## 2017-04-01 NOTE — ED INITIAL ASSESSMENT (HPI)
Patient complains of pain to RUQ radiating to back since yesterday, pain to R shoulder blade, history of gallstone, states she has nausea after she eats

## 2017-04-01 NOTE — TELEPHONE ENCOUNTER
C/o RUQ pain radiating to to the back lasting > 30 hs with some nausea. Advised to go to Ed. H/o biliary dyskinesia and dilated duct.

## 2017-04-04 ENCOUNTER — OFFICE VISIT (OUTPATIENT)
Dept: SURGERY | Facility: CLINIC | Age: 27
End: 2017-04-04

## 2017-04-04 ENCOUNTER — OFFICE VISIT (OUTPATIENT)
Dept: OTOLARYNGOLOGY | Facility: CLINIC | Age: 27
End: 2017-04-04

## 2017-04-04 VITALS
WEIGHT: 231 LBS | RESPIRATION RATE: 16 BRPM | DIASTOLIC BLOOD PRESSURE: 74 MMHG | HEART RATE: 72 BPM | SYSTOLIC BLOOD PRESSURE: 109 MMHG | BODY MASS INDEX: 31 KG/M2

## 2017-04-04 VITALS
DIASTOLIC BLOOD PRESSURE: 67 MMHG | WEIGHT: 229 LBS | SYSTOLIC BLOOD PRESSURE: 100 MMHG | TEMPERATURE: 98 F | HEIGHT: 71 IN | BODY MASS INDEX: 32.06 KG/M2

## 2017-04-04 DIAGNOSIS — J34.2 DEVIATED SEPTUM: Primary | ICD-10-CM

## 2017-04-04 PROCEDURE — 99213 OFFICE O/P EST LOW 20 MIN: CPT | Performed by: OTOLARYNGOLOGY

## 2017-04-04 PROCEDURE — 99212 OFFICE O/P EST SF 10 MIN: CPT | Performed by: OTOLARYNGOLOGY

## 2017-04-04 NOTE — PROGRESS NOTES
[address], [address] [address]  Dept: [phone]    4/4/2017    BARIATRIC EXISTING PATIENT/FOLLOW UP    HPI:  Akash Brown is a 23year old-year old female who p

## 2017-04-05 NOTE — PROGRESS NOTES
Kirill Leach is a 23year old female. Patient presents with:  Results: CT sinus done on 3-22    HPI:    She is in follow up of a sinus CT. I reviewed the CT scans myself and the findings with her.  CT shows a deviation of her nasal septum to the right mi loss, weight gain  SKIN: denies any unusual skin lesions or rashes  RESPIRATORY: denies shortness of breath with exertion  NEURO: denies headaches    EXAM:   /67 mmHg  Temp(Src) 97.8 °F (36.6 °C) (Oral)  Ht 5' 11\" (1.803 m)  Wt 229 lb (103.874 kg) plan.      Augustus Bryant.  Rubio Zavaleta MD  4/4/2017  10:36 PM

## 2017-04-10 ENCOUNTER — ANESTHESIA (OUTPATIENT)
Dept: SURGERY | Facility: HOSPITAL | Age: 27
End: 2017-04-10
Payer: COMMERCIAL

## 2017-04-10 ENCOUNTER — ANESTHESIA EVENT (OUTPATIENT)
Dept: SURGERY | Facility: HOSPITAL | Age: 27
End: 2017-04-10
Payer: COMMERCIAL

## 2017-04-10 ENCOUNTER — HOSPITAL ENCOUNTER (OUTPATIENT)
Facility: HOSPITAL | Age: 27
Setting detail: HOSPITAL OUTPATIENT SURGERY
Discharge: HOME | End: 2017-04-10
Attending: SURGERY | Admitting: SURGERY
Payer: COMMERCIAL

## 2017-04-10 ENCOUNTER — SURGERY (OUTPATIENT)
Age: 27
End: 2017-04-10

## 2017-04-10 VITALS
SYSTOLIC BLOOD PRESSURE: 140 MMHG | HEIGHT: 72 IN | TEMPERATURE: 98 F | HEART RATE: 86 BPM | DIASTOLIC BLOOD PRESSURE: 78 MMHG | BODY MASS INDEX: 30.34 KG/M2 | RESPIRATION RATE: 16 BRPM | OXYGEN SATURATION: 96 % | WEIGHT: 224 LBS

## 2017-04-10 DIAGNOSIS — K80.20 CALCULUS OF GALLBLADDER WITHOUT CHOLECYSTITIS WITHOUT OBSTRUCTION: Primary | ICD-10-CM

## 2017-04-10 PROCEDURE — 88304 TISSUE EXAM BY PATHOLOGIST: CPT | Performed by: SURGERY

## 2017-04-10 PROCEDURE — S0077 INJECTION, CLINDAMYCIN PHOSP: HCPCS | Performed by: NURSE ANESTHETIST, CERTIFIED REGISTERED

## 2017-04-10 PROCEDURE — 0FT44ZZ RESECTION OF GALLBLADDER, PERCUTANEOUS ENDOSCOPIC APPROACH: ICD-10-PCS | Performed by: SURGERY

## 2017-04-10 PROCEDURE — 81025 URINE PREGNANCY TEST: CPT

## 2017-04-10 PROCEDURE — S0077 INJECTION, CLINDAMYCIN PHOSP: HCPCS

## 2017-04-10 RX ORDER — DEXAMETHASONE SODIUM PHOSPHATE 4 MG/ML
VIAL (ML) INJECTION AS NEEDED
Status: DISCONTINUED | OUTPATIENT
Start: 2017-04-10 | End: 2017-04-10 | Stop reason: SURG

## 2017-04-10 RX ORDER — GLYCOPYRROLATE 0.2 MG/ML
INJECTION INTRAMUSCULAR; INTRAVENOUS AS NEEDED
Status: DISCONTINUED | OUTPATIENT
Start: 2017-04-10 | End: 2017-04-10 | Stop reason: SURG

## 2017-04-10 RX ORDER — KETOROLAC TROMETHAMINE 15 MG/ML
15 INJECTION, SOLUTION INTRAMUSCULAR; INTRAVENOUS EVERY 6 HOURS PRN
Status: DISCONTINUED | OUTPATIENT
Start: 2017-04-10 | End: 2017-04-10

## 2017-04-10 RX ORDER — HYDROCODONE BITARTRATE AND ACETAMINOPHEN 7.5; 325 MG/1; MG/1
1 TABLET ORAL EVERY 6 HOURS PRN
COMMUNITY
End: 2017-04-25 | Stop reason: ALTCHOICE

## 2017-04-10 RX ORDER — SODIUM CHLORIDE, SODIUM LACTATE, POTASSIUM CHLORIDE, CALCIUM CHLORIDE 600; 310; 30; 20 MG/100ML; MG/100ML; MG/100ML; MG/100ML
INJECTION, SOLUTION INTRAVENOUS CONTINUOUS
Status: DISCONTINUED | OUTPATIENT
Start: 2017-04-10 | End: 2017-04-10

## 2017-04-10 RX ORDER — HYDROCODONE BITARTRATE AND ACETAMINOPHEN 5; 325 MG/1; MG/1
2 TABLET ORAL AS NEEDED
Status: DISCONTINUED | OUTPATIENT
Start: 2017-04-10 | End: 2017-04-10

## 2017-04-10 RX ORDER — MORPHINE SULFATE 10 MG/ML
6 INJECTION, SOLUTION INTRAMUSCULAR; INTRAVENOUS EVERY 10 MIN PRN
Status: DISCONTINUED | OUTPATIENT
Start: 2017-04-10 | End: 2017-04-10

## 2017-04-10 RX ORDER — ONDANSETRON 2 MG/ML
4 INJECTION INTRAMUSCULAR; INTRAVENOUS ONCE AS NEEDED
Status: COMPLETED | OUTPATIENT
Start: 2017-04-10 | End: 2017-04-10

## 2017-04-10 RX ORDER — MORPHINE SULFATE 4 MG/ML
4 INJECTION, SOLUTION INTRAMUSCULAR; INTRAVENOUS EVERY 10 MIN PRN
Status: DISCONTINUED | OUTPATIENT
Start: 2017-04-10 | End: 2017-04-10

## 2017-04-10 RX ORDER — HYDROMORPHONE HYDROCHLORIDE 1 MG/ML
INJECTION, SOLUTION INTRAMUSCULAR; INTRAVENOUS; SUBCUTANEOUS AS NEEDED
Status: DISCONTINUED | OUTPATIENT
Start: 2017-04-10 | End: 2017-04-10 | Stop reason: SURG

## 2017-04-10 RX ORDER — HYDROCODONE BITARTRATE AND ACETAMINOPHEN 5; 325 MG/1; MG/1
2 TABLET ORAL EVERY 4 HOURS PRN
Status: DISCONTINUED | OUTPATIENT
Start: 2017-04-10 | End: 2017-04-10

## 2017-04-10 RX ORDER — SODIUM CHLORIDE, SODIUM LACTATE, POTASSIUM CHLORIDE, CALCIUM CHLORIDE 600; 310; 30; 20 MG/100ML; MG/100ML; MG/100ML; MG/100ML
INJECTION, SOLUTION INTRAVENOUS CONTINUOUS PRN
Status: DISCONTINUED | OUTPATIENT
Start: 2017-04-10 | End: 2017-04-10 | Stop reason: SURG

## 2017-04-10 RX ORDER — MAGNESIUM HYDROXIDE 1200 MG/15ML
LIQUID ORAL CONTINUOUS PRN
Status: DISCONTINUED | OUTPATIENT
Start: 2017-04-10 | End: 2017-04-10

## 2017-04-10 RX ORDER — DIPHENHYDRAMINE HYDROCHLORIDE 50 MG/ML
INJECTION INTRAMUSCULAR; INTRAVENOUS AS NEEDED
Status: DISCONTINUED | OUTPATIENT
Start: 2017-04-10 | End: 2017-04-10 | Stop reason: SURG

## 2017-04-10 RX ORDER — MORPHINE SULFATE 2 MG/ML
2 INJECTION, SOLUTION INTRAMUSCULAR; INTRAVENOUS EVERY 10 MIN PRN
Status: DISCONTINUED | OUTPATIENT
Start: 2017-04-10 | End: 2017-04-10

## 2017-04-10 RX ORDER — FAMOTIDINE 20 MG/1
20 TABLET ORAL ONCE
Status: COMPLETED | OUTPATIENT
Start: 2017-04-10 | End: 2017-04-10

## 2017-04-10 RX ORDER — MEPERIDINE HYDROCHLORIDE 50 MG/ML
25 INJECTION INTRAMUSCULAR; INTRAVENOUS; SUBCUTANEOUS ONCE
Status: COMPLETED | OUTPATIENT
Start: 2017-04-10 | End: 2017-04-10

## 2017-04-10 RX ORDER — OXYCODONE HYDROCHLORIDE AND ACETAMINOPHEN 5; 325 MG/1; MG/1
1 TABLET ORAL EVERY 4 HOURS PRN
COMMUNITY
End: 2017-04-25 | Stop reason: ALTCHOICE

## 2017-04-10 RX ORDER — HYDROMORPHONE HYDROCHLORIDE 1 MG/ML
0.6 INJECTION, SOLUTION INTRAMUSCULAR; INTRAVENOUS; SUBCUTANEOUS EVERY 5 MIN PRN
Status: DISCONTINUED | OUTPATIENT
Start: 2017-04-10 | End: 2017-04-10

## 2017-04-10 RX ORDER — ESMOLOL HYDROCHLORIDE 10 MG/ML
INJECTION INTRAVENOUS AS NEEDED
Status: DISCONTINUED | OUTPATIENT
Start: 2017-04-10 | End: 2017-04-10 | Stop reason: SURG

## 2017-04-10 RX ORDER — MIDAZOLAM HYDROCHLORIDE 1 MG/ML
INJECTION INTRAMUSCULAR; INTRAVENOUS AS NEEDED
Status: DISCONTINUED | OUTPATIENT
Start: 2017-04-10 | End: 2017-04-10 | Stop reason: SURG

## 2017-04-10 RX ORDER — ONDANSETRON 2 MG/ML
INJECTION INTRAMUSCULAR; INTRAVENOUS AS NEEDED
Status: DISCONTINUED | OUTPATIENT
Start: 2017-04-10 | End: 2017-04-10 | Stop reason: SURG

## 2017-04-10 RX ORDER — ACETAMINOPHEN 325 MG/1
650 TABLET ORAL ONCE
Status: DISCONTINUED | OUTPATIENT
Start: 2017-04-10 | End: 2017-04-10 | Stop reason: HOSPADM

## 2017-04-10 RX ORDER — MORPHINE SULFATE 2 MG/ML
2 INJECTION, SOLUTION INTRAMUSCULAR; INTRAVENOUS EVERY 2 HOUR PRN
Status: DISCONTINUED | OUTPATIENT
Start: 2017-04-10 | End: 2017-04-10

## 2017-04-10 RX ORDER — HYDROMORPHONE HYDROCHLORIDE 1 MG/ML
0.2 INJECTION, SOLUTION INTRAMUSCULAR; INTRAVENOUS; SUBCUTANEOUS EVERY 5 MIN PRN
Status: DISCONTINUED | OUTPATIENT
Start: 2017-04-10 | End: 2017-04-10

## 2017-04-10 RX ORDER — HYDROCODONE BITARTRATE AND ACETAMINOPHEN 5; 325 MG/1; MG/1
1 TABLET ORAL EVERY 4 HOURS PRN
Status: DISCONTINUED | OUTPATIENT
Start: 2017-04-10 | End: 2017-04-10

## 2017-04-10 RX ORDER — MORPHINE SULFATE 4 MG/ML
4 INJECTION, SOLUTION INTRAMUSCULAR; INTRAVENOUS EVERY 2 HOUR PRN
Status: DISCONTINUED | OUTPATIENT
Start: 2017-04-10 | End: 2017-04-10

## 2017-04-10 RX ORDER — HALOPERIDOL 5 MG/ML
0.25 INJECTION INTRAMUSCULAR ONCE AS NEEDED
Status: DISCONTINUED | OUTPATIENT
Start: 2017-04-10 | End: 2017-04-10

## 2017-04-10 RX ORDER — ACETAMINOPHEN 325 MG/1
650 TABLET ORAL EVERY 4 HOURS PRN
Status: DISCONTINUED | OUTPATIENT
Start: 2017-04-10 | End: 2017-04-10

## 2017-04-10 RX ORDER — MORPHINE SULFATE 4 MG/ML
6 INJECTION, SOLUTION INTRAMUSCULAR; INTRAVENOUS EVERY 2 HOUR PRN
Status: DISCONTINUED | OUTPATIENT
Start: 2017-04-10 | End: 2017-04-10

## 2017-04-10 RX ORDER — ROCURONIUM BROMIDE 10 MG/ML
INJECTION, SOLUTION INTRAVENOUS AS NEEDED
Status: DISCONTINUED | OUTPATIENT
Start: 2017-04-10 | End: 2017-04-10 | Stop reason: SURG

## 2017-04-10 RX ORDER — CLINDAMYCIN PHOSPHATE 600 MG/50ML
600 INJECTION INTRAVENOUS ONCE
Status: DISCONTINUED | OUTPATIENT
Start: 2017-04-10 | End: 2017-04-10 | Stop reason: HOSPADM

## 2017-04-10 RX ORDER — NEOSTIGMINE METHYLSULFATE 0.5 MG/ML
INJECTION INTRAVENOUS AS NEEDED
Status: DISCONTINUED | OUTPATIENT
Start: 2017-04-10 | End: 2017-04-10 | Stop reason: SURG

## 2017-04-10 RX ORDER — NALOXONE HYDROCHLORIDE 0.4 MG/ML
80 INJECTION, SOLUTION INTRAMUSCULAR; INTRAVENOUS; SUBCUTANEOUS AS NEEDED
Status: DISCONTINUED | OUTPATIENT
Start: 2017-04-10 | End: 2017-04-10

## 2017-04-10 RX ORDER — CLINDAMYCIN PHOSPHATE 150 MG/ML
INJECTION, SOLUTION INTRAVENOUS AS NEEDED
Status: DISCONTINUED | OUTPATIENT
Start: 2017-04-10 | End: 2017-04-10 | Stop reason: SURG

## 2017-04-10 RX ORDER — LIDOCAINE HYDROCHLORIDE 10 MG/ML
INJECTION, SOLUTION EPIDURAL; INFILTRATION; INTRACAUDAL; PERINEURAL AS NEEDED
Status: DISCONTINUED | OUTPATIENT
Start: 2017-04-10 | End: 2017-04-10 | Stop reason: SURG

## 2017-04-10 RX ORDER — KETOROLAC TROMETHAMINE 30 MG/ML
30 INJECTION, SOLUTION INTRAMUSCULAR; INTRAVENOUS EVERY 6 HOURS PRN
Status: DISCONTINUED | OUTPATIENT
Start: 2017-04-10 | End: 2017-04-10

## 2017-04-10 RX ORDER — HYDROCODONE BITARTRATE AND ACETAMINOPHEN 5; 325 MG/1; MG/1
1 TABLET ORAL AS NEEDED
Status: DISCONTINUED | OUTPATIENT
Start: 2017-04-10 | End: 2017-04-10

## 2017-04-10 RX ORDER — ONDANSETRON 2 MG/ML
4 INJECTION INTRAMUSCULAR; INTRAVENOUS EVERY 6 HOURS PRN
Status: DISCONTINUED | OUTPATIENT
Start: 2017-04-10 | End: 2017-04-10

## 2017-04-10 RX ORDER — HYDROMORPHONE HYDROCHLORIDE 1 MG/ML
0.4 INJECTION, SOLUTION INTRAMUSCULAR; INTRAVENOUS; SUBCUTANEOUS EVERY 5 MIN PRN
Status: DISCONTINUED | OUTPATIENT
Start: 2017-04-10 | End: 2017-04-10

## 2017-04-10 RX ADMIN — ONDANSETRON 4 MG: 2 INJECTION INTRAMUSCULAR; INTRAVENOUS at 16:03:00

## 2017-04-10 RX ADMIN — HYDROMORPHONE HYDROCHLORIDE 0.4 MG: 1 INJECTION, SOLUTION INTRAMUSCULAR; INTRAVENOUS; SUBCUTANEOUS at 15:34:00

## 2017-04-10 RX ADMIN — CLINDAMYCIN PHOSPHATE 600 MG: 150 INJECTION, SOLUTION INTRAVENOUS at 15:31:00

## 2017-04-10 RX ADMIN — GLYCOPYRROLATE 0.6 MG: 0.2 INJECTION INTRAMUSCULAR; INTRAVENOUS at 16:03:00

## 2017-04-10 RX ADMIN — ROCURONIUM BROMIDE 40 MG: 10 INJECTION, SOLUTION INTRAVENOUS at 15:25:00

## 2017-04-10 RX ADMIN — NEOSTIGMINE METHYLSULFATE 5 MG: 0.5 INJECTION INTRAVENOUS at 16:03:00

## 2017-04-10 RX ADMIN — ESMOLOL HYDROCHLORIDE 10 MG: 10 INJECTION INTRAVENOUS at 15:40:00

## 2017-04-10 RX ADMIN — DEXAMETHASONE SODIUM PHOSPHATE 8 MG: 4 MG/ML VIAL (ML) INJECTION at 15:31:00

## 2017-04-10 RX ADMIN — HYDROMORPHONE HYDROCHLORIDE 0.2 MG: 1 INJECTION, SOLUTION INTRAMUSCULAR; INTRAVENOUS; SUBCUTANEOUS at 16:07:00

## 2017-04-10 RX ADMIN — ESMOLOL HYDROCHLORIDE 10 MG: 10 INJECTION INTRAVENOUS at 15:36:00

## 2017-04-10 RX ADMIN — SODIUM CHLORIDE, SODIUM LACTATE, POTASSIUM CHLORIDE, CALCIUM CHLORIDE: 600; 310; 30; 20 INJECTION, SOLUTION INTRAVENOUS at 15:16:00

## 2017-04-10 RX ADMIN — DIPHENHYDRAMINE HYDROCHLORIDE 25 MG: 50 INJECTION INTRAMUSCULAR; INTRAVENOUS at 15:31:00

## 2017-04-10 RX ADMIN — LIDOCAINE HYDROCHLORIDE 50 MG: 10 INJECTION, SOLUTION EPIDURAL; INFILTRATION; INTRACAUDAL; PERINEURAL at 15:25:00

## 2017-04-10 RX ADMIN — HYDROMORPHONE HYDROCHLORIDE 0.4 MG: 1 INJECTION, SOLUTION INTRAMUSCULAR; INTRAVENOUS; SUBCUTANEOUS at 15:40:00

## 2017-04-10 RX ADMIN — MIDAZOLAM HYDROCHLORIDE 2 MG: 1 INJECTION INTRAMUSCULAR; INTRAVENOUS at 15:17:00

## 2017-04-10 NOTE — H&P
[address] Patient Status:  [facility] Outpatient Surgery    6/15/1990 MRN [id number]   Location [facility] PRE OP RECOVERY Attending Alfredo Agarwal MD   Hosp Day # 0 PCP Mihir Waller 5-325 MG Oral Tab Take 1 tablet by mouth every 4 (four) hours as needed for Pain. Disp:  Rfl:  4/9/2017 at Unknown time   hydrocodone-acetaminophen 7.5-325 MG Oral Tab Take 1 tablet by mouth every 6 (six) hours as needed for Pain.  Disp:  Rfl:  4/10/2017 at palpitation. Full range of motion to flexion and extension, lateral rotation and lateral flexion of cervical spine. No JVD. Supple. Lungs: Clear to auscultation bilaterally. Cardiac: Regular rate and rhythm. No murmur.   Abdomen:  Soft, non-distended, understand and wish to proceed    Time spent on counseling/coordination of care:  15 Minutes     Total time spent with patient:  [address]  4/10/2017  2:41 PM

## 2017-04-10 NOTE — ANESTHESIA POSTPROCEDURE EVALUATION
Patient: Zari Tineo    Procedure Summary     Date Anesthesia Start Anesthesia Stop Room / Location    04/10/17 1517 1622 300 [facility] MAIN OR 06 / 300 [facility] MAIN OR       Procedure Diagnosis Surgeon Responsible Provider    LAPAROSCOPIC CHOLECYSTECTOMY (N/A ) (Calcul

## 2017-04-10 NOTE — OPERATIVE REPORT
Marlen Hercules / Shady Shahid / Miguel Reaves / Dayami Zelaya / Jeri Wilcox / Abraham [facility] [phone]  Answering Service [phone]        Preop Diagnosis: biliary colic, abdominal pain  Postop Diagnosis: same  Procedure: Sable Lynn upper quadrant that showed no signs of dilation or abnormality there is no adhesive disease.   At this point all ports were removed under direct visualization the fascial defect at the umbilicus was closed using 0 Vicryl stitch was irrigated skin closed pat

## 2017-04-10 NOTE — BRIEF OP NOTE
[facility] OPERATING ROOM  Brief Op Note     Peg Goring Location: OR   CSN [id number] MRN [id number]   Admission Date 4/10/2017 Operation Date 4/10/2017   Attending Physician Gerardo Hopkins MD Operating Physician Rachel Abraham MD       Pre-

## 2017-04-10 NOTE — ANESTHESIA PREPROCEDURE EVALUATION
Anesthesia PreOp Note    HPI:     Ibeth Avalos is a 23year old female who presents for preoperative consultation requested by: Victoriano Hull MD    Date of Surgery: 4/10/2017    Procedure(s):  LAPAROSCOPIC CHOLECYSTECTOMY  Indication: Calculus of ga Past Surgical History    DENTAL SURGERY PROCEDURE      NAIL REMOVAL      OTHER SURGICAL HISTORY      Comment ganglion cyst removed    LAP SLEEVE GASTRECTOMY  07/25/2016    Comment Dr Hay Clarity Left     Comment cyst at 04/10/17 1315   clindamycin in D5W (CLEOCIN) premix 600mg/50ml 600 mg Intravenous Once Gerardo Hopkins MD      No current [facility]-ordered outpatient prescriptions on file.       Dust                      Keflex [Cephalexin]         Comment:Other reaction( is 124/75 and her pulse is 76. Her respiration is 16 and oxygen saturation is 98%.     04/06/17  1512 04/10/17  1254   BP:  124/75   Pulse:  76   Temp:  98.5 °F (36.9 °C)   TempSrc:  Oral   Resp:  16   Height: 1.803 m (5' 11\") 1.829 m (6')   Weight: 103.42

## 2017-04-17 ENCOUNTER — OFFICE VISIT (OUTPATIENT)
Dept: SURGERY | Facility: CLINIC | Age: 27
End: 2017-04-17

## 2017-04-17 VITALS
WEIGHT: 225.44 LBS | OXYGEN SATURATION: 100 % | HEIGHT: 72 IN | SYSTOLIC BLOOD PRESSURE: 129 MMHG | RESPIRATION RATE: 16 BRPM | BODY MASS INDEX: 30.54 KG/M2 | DIASTOLIC BLOOD PRESSURE: 80 MMHG | HEART RATE: 98 BPM

## 2017-04-17 DIAGNOSIS — F43.9 STRESS: ICD-10-CM

## 2017-04-17 DIAGNOSIS — R60.0 BILATERAL EDEMA OF LOWER EXTREMITY: ICD-10-CM

## 2017-04-17 DIAGNOSIS — K21.00 GASTROESOPHAGEAL REFLUX DISEASE WITH ESOPHAGITIS: Primary | ICD-10-CM

## 2017-04-17 DIAGNOSIS — Z98.890 S/P GASTROPLASTY: ICD-10-CM

## 2017-04-17 DIAGNOSIS — L30.4 INTERTRIGO: ICD-10-CM

## 2017-04-17 PROCEDURE — 99214 OFFICE O/P EST MOD 30 MIN: CPT | Performed by: INTERNAL MEDICINE

## 2017-04-17 RX ORDER — BUPROPION HYDROCHLORIDE 100 MG/1
100 TABLET ORAL 2 TIMES DAILY
Qty: 60 TABLET | Refills: 2 | Status: SHIPPED | OUTPATIENT
Start: 2017-04-17 | End: 2017-07-27

## 2017-04-17 NOTE — PROGRESS NOTES
[facility] [facility] AND WEIGHT LOSS CLINIC  [address] [place] [address]  Dept: [phone]  Loc: [phone]    Date: 10/13/2016    Patient:  Darell Fam  :      6/15/1990  MRN:      [id number]    Eleazar WATTS Disp: , Rfl:   •  Probiotic Product (PROBIOTIC DAILY OR), Take by mouth., Disp: , Rfl:   •  Multiple Vitamins-Minerals (MULTIVITAMIN OR), Take by mouth., Disp: , Rfl:   •  Fluticasone Propionate (FLONASE) 50 MCG/ACT Nasal Suspension, 2 sprays by Each Nare negative  Cardiovascular: negative  Gastrointestinal: positive for diarrhea  Musculoskeletal:negative  Neurological: positive for headaches  Behavioral/Psych: negative  Endocrine: negative  All other systems were reviewed and are negative    Assessment

## 2017-04-20 ENCOUNTER — MED REC SCAN ONLY (OUTPATIENT)
Dept: INTERNAL MEDICINE CLINIC | Facility: CLINIC | Age: 27
End: 2017-04-20

## 2017-04-25 ENCOUNTER — OFFICE VISIT (OUTPATIENT)
Dept: INTERNAL MEDICINE CLINIC | Facility: CLINIC | Age: 27
End: 2017-04-25

## 2017-04-25 VITALS
DIASTOLIC BLOOD PRESSURE: 74 MMHG | SYSTOLIC BLOOD PRESSURE: 124 MMHG | HEART RATE: 83 BPM | WEIGHT: 228 LBS | BODY MASS INDEX: 31 KG/M2 | OXYGEN SATURATION: 100 % | TEMPERATURE: 98 F

## 2017-04-25 DIAGNOSIS — J01.10 ACUTE FRONTAL SINUSITIS, RECURRENCE NOT SPECIFIED: Primary | ICD-10-CM

## 2017-04-25 DIAGNOSIS — Z91.09 ENVIRONMENTAL ALLERGIES: ICD-10-CM

## 2017-04-25 DIAGNOSIS — Z98.890 STATUS POST SURGERY: ICD-10-CM

## 2017-04-25 PROCEDURE — 99213 OFFICE O/P EST LOW 20 MIN: CPT | Performed by: FAMILY MEDICINE

## 2017-04-25 RX ORDER — CLARITHROMYCIN 500 MG/1
500 TABLET, COATED ORAL 2 TIMES DAILY
Qty: 20 TABLET | Refills: 0 | Status: SHIPPED | OUTPATIENT
Start: 2017-04-25 | End: 2017-05-05

## 2017-04-25 RX ORDER — MONTELUKAST SODIUM 10 MG/1
10 TABLET ORAL NIGHTLY
Qty: 90 TABLET | Refills: 0 | Status: SHIPPED | OUTPATIENT
Start: 2017-04-25 | End: 2017-07-17

## 2017-04-25 RX ORDER — PANTOPRAZOLE SODIUM 40 MG/1
40 TABLET, DELAYED RELEASE ORAL 2 TIMES DAILY
Refills: 0 | COMMUNITY
Start: 2017-04-05 | End: 2017-06-14

## 2017-04-26 NOTE — PROGRESS NOTES
CC:  Wound      Hx of CC:  S/P SHAHLA PORTER 2 WEEKS AGO--ONE OF THE WOUNDS WAS LEAKING FLUID BUT NOW CLOSED. INCREASED FATIGUE/MALAISE OVER PAST 3-4 DAYS.     Vitals:    04/25/17  0946   BP: 124/74   Pulse: 83   Temp: 98.3 °F (36.8 °C)   Weight: 228 lb   SpO

## 2017-05-15 RX ORDER — HYDROCHLOROTHIAZIDE 25 MG/1
TABLET ORAL
Qty: 30 TABLET | Refills: 1 | Status: SHIPPED | OUTPATIENT
Start: 2017-05-15 | End: 2017-11-21

## 2017-05-16 ENCOUNTER — OFFICE VISIT (OUTPATIENT)
Dept: SURGERY | Facility: CLINIC | Age: 27
End: 2017-05-16

## 2017-05-16 VITALS
SYSTOLIC BLOOD PRESSURE: 121 MMHG | BODY MASS INDEX: 30.7 KG/M2 | DIASTOLIC BLOOD PRESSURE: 73 MMHG | HEIGHT: 71.75 IN | WEIGHT: 224.19 LBS | HEART RATE: 80 BPM

## 2017-05-16 NOTE — PROGRESS NOTES
[address], [place]  [address] [address]  Dept: [phone]    5/16/2017    BARIATRIC EXISTING PATIENT/FOLLOW UP    HPI:  Ibeth Avalos is a 23year old-year old female who follow-up with the medical bariatrician prev indicated.     Heather Oliva MD  5/16/2017

## 2017-05-17 ENCOUNTER — TELEPHONE (OUTPATIENT)
Dept: SURGERY | Facility: CLINIC | Age: 27
End: 2017-05-17

## 2017-05-17 NOTE — TELEPHONE ENCOUNTER
5/15/17 @ 3:49pm Spoke to HAWK at [place], [phone]. Ref# is name of rep, date & time of call. She verified that patient has following benefits for Bariatric services: [employer] (CPF#[id number]) DX E66.01.  Dietitians for ([id number]) are currently not I

## 2017-05-24 ENCOUNTER — TELEPHONE (OUTPATIENT)
Dept: SURGERY | Facility: CLINIC | Age: 27
End: 2017-05-24

## 2017-05-24 NOTE — TELEPHONE ENCOUNTER
Left Message for patient to call regarding her billing inquiry from date of service 5/16/16.  I received response back from billing dept that payment was finally received from insurance carrier on 5/12/17 and a refund for patient overpayment was sent out on

## 2017-05-24 NOTE — TELEPHONE ENCOUNTER
Received call from patient. Advised her of 5/16/16 claim status and that she should be receiving an overpayment refund in mail within the next week. Told patient to call if she receives any further statements regarding this date of service.

## 2017-05-25 ENCOUNTER — OFFICE VISIT (OUTPATIENT)
Dept: INTERNAL MEDICINE CLINIC | Facility: CLINIC | Age: 27
End: 2017-05-25

## 2017-05-25 VITALS
HEIGHT: 71.75 IN | OXYGEN SATURATION: 98 % | RESPIRATION RATE: 18 BRPM | SYSTOLIC BLOOD PRESSURE: 124 MMHG | DIASTOLIC BLOOD PRESSURE: 76 MMHG | BODY MASS INDEX: 30.95 KG/M2 | WEIGHT: 226 LBS | HEART RATE: 79 BPM

## 2017-05-25 DIAGNOSIS — R30.0 DYSURIA: ICD-10-CM

## 2017-05-25 DIAGNOSIS — Z00.00 PHYSICAL EXAM: Primary | ICD-10-CM

## 2017-05-25 DIAGNOSIS — F32.A DEPRESSION, UNSPECIFIED DEPRESSION TYPE: ICD-10-CM

## 2017-05-25 PROCEDURE — 87086 URINE CULTURE/COLONY COUNT: CPT | Performed by: FAMILY MEDICINE

## 2017-05-25 PROCEDURE — 99395 PREV VISIT EST AGE 18-39: CPT | Performed by: FAMILY MEDICINE

## 2017-05-25 RX ORDER — BUPROPION HYDROCHLORIDE 150 MG/1
150 TABLET, EXTENDED RELEASE ORAL 2 TIMES DAILY
Qty: 60 TABLET | Refills: 0 | Status: SHIPPED | OUTPATIENT
Start: 2017-05-25 | End: 2017-06-21

## 2017-05-25 RX ORDER — CIPROFLOXACIN 500 MG/1
500 TABLET, FILM COATED ORAL 2 TIMES DAILY
Qty: 10 TABLET | Refills: 0 | Status: SHIPPED | OUTPATIENT
Start: 2017-05-25 | End: 2017-07-13 | Stop reason: ALTCHOICE

## 2017-05-25 NOTE — PROGRESS NOTES
HPI:   Veronica Santiago is a 23year old female who presents for a complete physical exam.   Last pap:  12/16 with Dr Maurilio Lyles  Menses:  Stopping now that new IUD placed  Contraception:  IUD placed in Dec   Sexually active: yes  Family hx of breast, ovarian, ce 100 MG Oral Tab Take 1 tablet (100 mg total) by mouth 2 (two) times daily. Disp: 60 tablet Rfl: 2   Fexofenadine HCl (ALLEGRA OR) Take 1 tablet by mouth daily.  Disp:  Rfl:    CARAFATE 1 GM/10ML Oral Suspension TAKE 10ML BY MOUTH EVERY 6 HOURS Disp: 1200 mL denies headaches  PSYCHE: denies depression or anxiety    EXAM:   /76 mmHg  Pulse 79  Resp 18  Ht 71.75\"  Wt 226 lb  BMI 30.88 kg/m2  SpO2 98%  Body mass index is 30.88 kg/(m^2).    GENERAL: well developed, well nourished,in no apparent distress  SKI defined types were placed in this encounter.        Meds & Refills for this Visit:  No prescriptions requested or ordered in this encounter    Imaging & Consults:  None

## 2017-06-14 RX ORDER — PANTOPRAZOLE SODIUM 40 MG/1
TABLET, DELAYED RELEASE ORAL
Qty: 180 TABLET | Refills: 5 | Status: SHIPPED | OUTPATIENT
Start: 2017-06-14 | End: 2017-11-28

## 2017-06-14 NOTE — TELEPHONE ENCOUNTER
Pending Prescriptions Disp Refills    PANTOPRAZOLE SODIUM 40 MG Oral Tab EC [Pharmacy Med Name: PANTOPRAZOLE SOD DR 40 MG TAB] 180 tablet 0     Sig: TAKE 1 TABLET BY MOUTH TWICE A DAY         See refill request  Patient last seen 2-16-17.    Medication la

## 2017-06-15 RX ORDER — BUPROPION HYDROCHLORIDE 100 MG/1
TABLET ORAL
Qty: 60 TABLET | Refills: 2 | Status: SHIPPED | OUTPATIENT
Start: 2017-06-15 | End: 2017-07-27

## 2017-06-21 RX ORDER — BUPROPION HYDROCHLORIDE 150 MG/1
TABLET, EXTENDED RELEASE ORAL
Qty: 60 TABLET | Refills: 0 | Status: SHIPPED | OUTPATIENT
Start: 2017-06-21 | End: 2017-07-27 | Stop reason: DRUGHIGH

## 2017-07-13 ENCOUNTER — APPOINTMENT (OUTPATIENT)
Dept: GENERAL RADIOLOGY | Age: 27
End: 2017-07-13
Attending: EMERGENCY MEDICINE
Payer: COMMERCIAL

## 2017-07-13 ENCOUNTER — HOSPITAL ENCOUNTER (OUTPATIENT)
Age: 27
Discharge: HOME | End: 2017-07-13
Attending: EMERGENCY MEDICINE
Payer: COMMERCIAL

## 2017-07-13 VITALS
WEIGHT: 221 LBS | SYSTOLIC BLOOD PRESSURE: 118 MMHG | RESPIRATION RATE: 20 BRPM | HEIGHT: 71 IN | HEART RATE: 72 BPM | BODY MASS INDEX: 30.94 KG/M2 | OXYGEN SATURATION: 98 % | TEMPERATURE: 98 F | DIASTOLIC BLOOD PRESSURE: 63 MMHG

## 2017-07-13 DIAGNOSIS — M79.601 PAIN OF RIGHT UPPER EXTREMITY: Primary | ICD-10-CM

## 2017-07-13 PROCEDURE — 99213 OFFICE O/P EST LOW 20 MIN: CPT

## 2017-07-13 PROCEDURE — 99214 OFFICE O/P EST MOD 30 MIN: CPT

## 2017-07-13 PROCEDURE — 73030 X-RAY EXAM OF SHOULDER: CPT | Performed by: EMERGENCY MEDICINE

## 2017-07-13 RX ORDER — ACETAMINOPHEN AND CODEINE PHOSPHATE 300; 30 MG/1; MG/1
1 TABLET ORAL EVERY 4 HOURS PRN
Qty: 24 TABLET | Refills: 0 | Status: SHIPPED | OUTPATIENT
Start: 2017-07-13 | End: 2017-07-27 | Stop reason: ALTCHOICE

## 2017-07-13 NOTE — ED INITIAL ASSESSMENT (HPI)
PATIENT ARRIVED AMBULATORY TO ROOM C/O RIGHT SHOULDER PAIN. 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 NO RELIEF WITH ICE OR HEAT. PT DENIES NUMBNESS/TINGLING TO HAND. LIMITED RANGE OF MOTION WITH SHOULDER.

## 2017-07-13 NOTE — ED PROVIDER NOTES
Patient presents with:  Shoulder Pain      HPI:     Dino Hernández is a 33year old female who presents today with a chief complaint of right upper extremity pain.   Patient states while in exercise class she was using the right arm and then felt a popping pronation of the right elbow.     MDM/Assessment/Plan:   Orders for this encounter:    Orders Placed This Encounter      XR SHOULDER, COMPLETE (MIN 2 VIEWS), RIGHT (CPT=73030) Once          Order Comments:              RIGHT SHOLDER PAIN PATIENT ARRIVED AMB 11079  [phone]    In 4 days  for evaluation

## 2017-07-17 DIAGNOSIS — Z91.09 ENVIRONMENTAL ALLERGIES: ICD-10-CM

## 2017-07-17 RX ORDER — MONTELUKAST SODIUM 10 MG/1
10 TABLET ORAL NIGHTLY
Qty: 90 TABLET | Refills: 0 | Status: SHIPPED | OUTPATIENT
Start: 2017-07-17 | End: 2017-07-27

## 2017-07-18 ENCOUNTER — TELEPHONE (OUTPATIENT)
Dept: INTERNAL MEDICINE CLINIC | Facility: CLINIC | Age: 27
End: 2017-07-18

## 2017-07-18 RX ORDER — ACETAMINOPHEN 500 MG
1000 TABLET ORAL
COMMUNITY
Start: 2017-07-16 | End: 2017-07-27 | Stop reason: ALTCHOICE

## 2017-07-18 RX ORDER — LEVOTHYROXINE SODIUM 50 UG/1
50 CAPSULE ORAL
COMMUNITY
End: 2017-07-27 | Stop reason: ALTCHOICE

## 2017-07-18 RX ORDER — CYCLOBENZAPRINE HCL 10 MG
TABLET ORAL
COMMUNITY
End: 2017-07-27 | Stop reason: ALTCHOICE

## 2017-07-18 RX ORDER — SUMATRIPTAN 25 MG/1
TABLET, FILM COATED ORAL
COMMUNITY
End: 2017-07-27 | Stop reason: ALTCHOICE

## 2017-07-18 RX ORDER — SUCRALFATE ORAL 1 G/10ML
SUSPENSION ORAL
COMMUNITY
End: 2017-07-27

## 2017-07-18 RX ORDER — BUTALBITAL, ACETAMINOPHEN AND CAFFEINE 50; 325; 40 MG/1; MG/1; MG/1
TABLET ORAL
COMMUNITY
End: 2017-07-27

## 2017-07-18 RX ORDER — PANTOPRAZOLE SODIUM 40 MG/1
40 TABLET, DELAYED RELEASE ORAL
COMMUNITY
End: 2017-07-27

## 2017-07-18 RX ORDER — OXYCODONE HYDROCHLORIDE 5 MG/1
10 CAPSULE ORAL
COMMUNITY
Start: 2017-07-16 | End: 2017-07-27 | Stop reason: ALTCHOICE

## 2017-07-18 RX ORDER — PANTOPRAZOLE SODIUM 40 MG/1
GRANULE, DELAYED RELEASE ORAL
COMMUNITY
End: 2017-07-27

## 2017-07-18 RX ORDER — ONDANSETRON 4 MG/1
4 TABLET, ORALLY DISINTEGRATING ORAL
COMMUNITY
Start: 2017-07-16 | End: 2017-07-27 | Stop reason: ALTCHOICE

## 2017-07-18 RX ORDER — HYDROCHLOROTHIAZIDE 25 MG/1
TABLET ORAL
COMMUNITY
End: 2017-07-27

## 2017-07-18 RX ORDER — BUPROPION HYDROCHLORIDE 150 MG/1
150 TABLET ORAL
COMMUNITY
End: 2017-07-27

## 2017-07-18 RX ORDER — FEXOFENADINE HCL 180 MG/1
180 TABLET ORAL
COMMUNITY
End: 2017-07-27

## 2017-07-18 RX ORDER — CETIRIZINE HYDROCHLORIDE 10 MG/1
TABLET ORAL
COMMUNITY
End: 2017-07-27 | Stop reason: DRUGHIGH

## 2017-07-18 RX ORDER — FLUTICASONE PROPIONATE 50 MCG
SPRAY, SUSPENSION (ML) NASAL
COMMUNITY
End: 2017-07-27

## 2017-07-18 RX ORDER — HYDROCHLOROTHIAZIDE 25 MG/1
TABLET ORAL
Qty: 30 TABLET | Refills: 1 | OUTPATIENT
Start: 2017-07-18

## 2017-07-18 RX ORDER — TOPIRAMATE 25 MG/1
TABLET ORAL
COMMUNITY
End: 2017-07-27

## 2017-07-18 NOTE — TELEPHONE ENCOUNTER
Diarrhea, fever 100 with tylenol, burning when urinates has to go out of town tomorrow. Patient is very anxious about the fever and her current condition.  Dr Maudine Closs requested a UA because antibiotic could worsen the the condition but the patient cannot make

## 2017-07-27 ENCOUNTER — OFFICE VISIT (OUTPATIENT)
Dept: SURGERY | Facility: CLINIC | Age: 27
End: 2017-07-27

## 2017-07-27 VITALS
SYSTOLIC BLOOD PRESSURE: 118 MMHG | BODY MASS INDEX: 31.3 KG/M2 | HEART RATE: 93 BPM | OXYGEN SATURATION: 98 % | HEIGHT: 71 IN | DIASTOLIC BLOOD PRESSURE: 74 MMHG | WEIGHT: 223.56 LBS

## 2017-07-27 DIAGNOSIS — E44.1 PROTEIN-CALORIE MALNUTRITION, MILD (HCC): ICD-10-CM

## 2017-07-27 DIAGNOSIS — E66.9 OBESITY (BMI 30-39.9): ICD-10-CM

## 2017-07-27 DIAGNOSIS — E78.1 HYPERTRIGLYCERIDEMIA: Primary | ICD-10-CM

## 2017-07-27 DIAGNOSIS — Z98.890 S/P GASTROPLASTY: ICD-10-CM

## 2017-07-27 PROCEDURE — 99213 OFFICE O/P EST LOW 20 MIN: CPT | Performed by: INTERNAL MEDICINE

## 2017-07-27 RX ORDER — BUPROPION HYDROCHLORIDE 100 MG/1
100 TABLET ORAL 3 TIMES DAILY
Qty: 30 TABLET | Refills: 2 | Status: SHIPPED | OUTPATIENT
Start: 2017-07-27 | End: 2017-10-10

## 2017-07-27 NOTE — PROGRESS NOTES
[facility] AND [facility] LOSS CLINIC  [address] [place] [address]  Dept: [phone]  Loc: [phone]    Date: 10/13/2016    Patient:  Cathy Ott  :      6/15/1990  MRN:      [id number]    Referring P tablet, Rfl: 1  •  Fluticasone Propionate (FLONASE) 50 MCG/ACT Nasal Suspension, 2 sprays by Each Nare route daily. , Disp: 1 Inhaler, Rfl: 1    Allergies:  Keflex [Cephalexin]; Penicillins; Lexapro [Escitalopram];  Topamax [Topiramate]     Social History: other systems were reviewed and are negative    Assessment       Encounter Diagnosis(ses):   Hypertriglyceridemia  (primary encounter diagnosis)  Protein-calorie malnutrition, mild (hcc)  S/p gastroplasty  Obesity (bmi 30-39. 9)    Plan     S/P VSG    Recen

## 2017-07-28 ENCOUNTER — TELEPHONE (OUTPATIENT)
Dept: SURGERY | Facility: CLINIC | Age: 27
End: 2017-07-28

## 2017-07-28 NOTE — TELEPHONE ENCOUNTER
Per request of patient, Lab summary was mailed.  I followed up with a call to patient and left a message that should she have any issues when presenting paperwork to [facility] to please contact us and we will assist.

## 2017-08-08 ENCOUNTER — TELEPHONE (OUTPATIENT)
Dept: SURGERY | Facility: CLINIC | Age: 27
End: 2017-08-08

## 2017-08-08 NOTE — TELEPHONE ENCOUNTER
Patient called to inform Dr. Nikolai Stevenson that her blood sugar has been very low. She has been very dizzy, shaking, loss of vision even after eating. Sugar levels have been below 70.  She feels that this started happening after new medication prescribed by Dr. Neyda Kowalski

## 2017-09-27 ENCOUNTER — HOSPITAL ENCOUNTER (EMERGENCY)
Facility: HOSPITAL | Age: 27
Discharge: HOME | End: 2017-09-27
Attending: PHYSICIAN ASSISTANT
Payer: COMMERCIAL

## 2017-09-27 ENCOUNTER — APPOINTMENT (OUTPATIENT)
Dept: GENERAL RADIOLOGY | Facility: HOSPITAL | Age: 27
End: 2017-09-27
Payer: COMMERCIAL

## 2017-09-27 VITALS
HEIGHT: 72 IN | TEMPERATURE: 99 F | SYSTOLIC BLOOD PRESSURE: 114 MMHG | HEART RATE: 66 BPM | DIASTOLIC BLOOD PRESSURE: 71 MMHG | BODY MASS INDEX: 28.17 KG/M2 | WEIGHT: 208 LBS | RESPIRATION RATE: 18 BRPM | OXYGEN SATURATION: 99 %

## 2017-09-27 DIAGNOSIS — S76.012A HIP STRAIN, LEFT, INITIAL ENCOUNTER: Primary | ICD-10-CM

## 2017-09-27 PROCEDURE — 99283 EMERGENCY DEPT VISIT LOW MDM: CPT

## 2017-09-27 PROCEDURE — 73502 X-RAY EXAM HIP UNI 2-3 VIEWS: CPT | Performed by: PHYSICIAN ASSISTANT

## 2017-09-27 RX ORDER — HYDROCODONE BITARTRATE AND ACETAMINOPHEN 5; 325 MG/1; MG/1
2 TABLET ORAL ONCE
Status: COMPLETED | OUTPATIENT
Start: 2017-09-27 | End: 2017-09-27

## 2017-09-27 RX ORDER — HYDROCODONE BITARTRATE AND ACETAMINOPHEN 5; 325 MG/1; MG/1
1 TABLET ORAL EVERY 6 HOURS PRN
Qty: 12 TABLET | Refills: 0 | Status: SHIPPED | OUTPATIENT
Start: 2017-09-27 | End: 2017-10-04

## 2017-09-28 ENCOUNTER — TELEPHONE (OUTPATIENT)
Dept: INTERNAL MEDICINE CLINIC | Facility: CLINIC | Age: 27
End: 2017-09-28

## 2017-09-28 RX ORDER — METRONIDAZOLE 500 MG/1
TABLET ORAL
Refills: 0 | COMMUNITY
Start: 2017-07-19 | End: 2017-11-21

## 2017-09-28 RX ORDER — CIPROFLOXACIN 500 MG/1
500 TABLET, FILM COATED ORAL 2 TIMES DAILY
Refills: 0 | COMMUNITY
Start: 2017-07-19 | End: 2017-11-21

## 2017-09-28 RX ORDER — DICYCLOMINE HCL 20 MG
TABLET ORAL
Refills: 0 | COMMUNITY
Start: 2017-07-19 | End: 2017-11-21
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
## 2017-10-02 ENCOUNTER — HOSPITAL ENCOUNTER (OUTPATIENT)
Dept: ULTRASOUND IMAGING | Facility: HOSPITAL | Age: 27
Discharge: HOME | End: 2017-10-02
Attending: UROLOGY
Payer: COMMERCIAL

## 2017-10-02 DIAGNOSIS — Q61.3 PKD (POLYCYSTIC KIDNEY DISEASE): ICD-10-CM

## 2017-10-02 PROCEDURE — 76770 US EXAM ABDO BACK WALL COMP: CPT | Performed by: UROLOGY

## 2017-10-02 NOTE — TELEPHONE ENCOUNTER
Called pt. Left message for pt to call back to schedule appointment if having pain and needs medication.  Per Dr Mayra Cruz pt can be seen Wednesday or Thursday during her lunch

## 2017-10-11 RX ORDER — BUPROPION HYDROCHLORIDE 100 MG/1
100 TABLET ORAL 3 TIMES DAILY
Qty: 30 TABLET | Refills: 2 | Status: SHIPPED | OUTPATIENT
Start: 2017-10-11 | End: 2017-11-09

## 2017-10-26 ENCOUNTER — HOSPITAL ENCOUNTER (EMERGENCY)
Facility: HOSPITAL | Age: 27
Discharge: HOME | End: 2017-10-26
Attending: EMERGENCY MEDICINE
Payer: COMMERCIAL

## 2017-10-26 VITALS
BODY MASS INDEX: 27.77 KG/M2 | WEIGHT: 205 LBS | RESPIRATION RATE: 18 BRPM | TEMPERATURE: 98 F | DIASTOLIC BLOOD PRESSURE: 68 MMHG | HEIGHT: 72 IN | OXYGEN SATURATION: 10 % | SYSTOLIC BLOOD PRESSURE: 109 MMHG | HEART RATE: 80 BPM

## 2017-10-26 DIAGNOSIS — T78.40XA ALLERGIC REACTION, INITIAL ENCOUNTER: Primary | ICD-10-CM

## 2017-10-26 PROCEDURE — 99283 EMERGENCY DEPT VISIT LOW MDM: CPT

## 2017-10-26 RX ORDER — PREDNISONE 20 MG/1
60 TABLET ORAL ONCE
Status: COMPLETED | OUTPATIENT
Start: 2017-10-26 | End: 2017-10-26

## 2017-10-26 RX ORDER — PREDNISONE 20 MG/1
20 TABLET ORAL ONCE
Status: DISCONTINUED | OUTPATIENT
Start: 2017-10-26 | End: 2017-10-26

## 2017-10-26 RX ORDER — DIPHENHYDRAMINE HYDROCHLORIDE 50 MG/ML
25 INJECTION INTRAMUSCULAR; INTRAVENOUS ONCE
Status: DISCONTINUED | OUTPATIENT
Start: 2017-10-26 | End: 2017-10-26

## 2017-10-26 RX ORDER — METHYLPREDNISOLONE SODIUM SUCCINATE 125 MG/2ML
125 INJECTION, POWDER, LYOPHILIZED, FOR SOLUTION INTRAMUSCULAR; INTRAVENOUS ONCE
Status: DISCONTINUED | OUTPATIENT
Start: 2017-10-26 | End: 2017-10-26

## 2017-10-26 RX ORDER — DIPHENHYDRAMINE HCL 25 MG
25 CAPSULE ORAL ONCE
Status: COMPLETED | OUTPATIENT
Start: 2017-10-26 | End: 2017-10-26

## 2017-10-27 NOTE — ED PROVIDER NOTES
Patient Seen in: [facility] AND [facility] Emergency Department    History   No chief complaint on file. Stated Complaint: Allergic Reaction    HPI    Patient is a 64-year-old female with a history of environmental allergies including dust and mold.   She went today and agreed except as otherwise stated in HPI.     Physical Exam   ED Triage Vitals [10/26/17 2000]  BP: 141/60  Pulse: 76  Resp: 18  Temp: 97.6 °F (36.4 °C)  Temp src: Oral  SpO2: 98 %  O2 Device: None (Room air)    Current:/68   Pulse 80   Temp Course  ------------------------------------------------------------  MDM           Disposition and Plan     Clinical Impression:  Allergic reaction, initial encounter  (primary encounter diagnosis)    Disposition:  Discharge    Follow-up:  Trisha Allen

## 2017-10-27 NOTE — ED NOTES
Patient is unable to tolerated IV insertion. She became \"light-headed and feels like she will pass out\" prior to insertion attempt. Patient is requesting oral medication in place of IV meds.

## 2017-10-27 NOTE — ED INITIAL ASSESSMENT (HPI)
Patient had allergy shots 2 hours ago and states \"I'm having an allergic reaction to them\". Patient states she is itchy all over and having a harder time breathing. Patient airway patent and in no distress a this time. Patient keeps itching her head.  Vaibhav Noriega

## 2017-10-27 NOTE — ED NOTES
Patient is cleared for discharge per MD. Discharge instructions reviewed with patient including when and how to follow up with healthcare providers and when to seek emergency care. Medications were reviewed per MD request. Patient dressed self.   Ambulated

## 2017-11-09 ENCOUNTER — OFFICE VISIT (OUTPATIENT)
Dept: SURGERY | Facility: CLINIC | Age: 27
End: 2017-11-09

## 2017-11-09 VITALS
HEART RATE: 77 BPM | RESPIRATION RATE: 18 BRPM | WEIGHT: 211.5 LBS | OXYGEN SATURATION: 99 % | DIASTOLIC BLOOD PRESSURE: 74 MMHG | BODY MASS INDEX: 29.61 KG/M2 | TEMPERATURE: 97 F | SYSTOLIC BLOOD PRESSURE: 119 MMHG | HEIGHT: 71 IN

## 2017-11-09 DIAGNOSIS — R53.83 FATIGUE, UNSPECIFIED TYPE: ICD-10-CM

## 2017-11-09 DIAGNOSIS — L30.4 INTERTRIGO: ICD-10-CM

## 2017-11-09 DIAGNOSIS — E44.1 PROTEIN-CALORIE MALNUTRITION, MILD (HCC): ICD-10-CM

## 2017-11-09 DIAGNOSIS — E78.1 HYPERTRIGLYCERIDEMIA: Primary | ICD-10-CM

## 2017-11-09 DIAGNOSIS — Z98.890 S/P GASTROPLASTY: ICD-10-CM

## 2017-11-09 DIAGNOSIS — E66.3 OVERWEIGHT (BMI 25.0-29.9): ICD-10-CM

## 2017-11-09 PROCEDURE — 99214 OFFICE O/P EST MOD 30 MIN: CPT | Performed by: INTERNAL MEDICINE

## 2017-11-09 RX ORDER — BUPROPION HYDROCHLORIDE 100 MG/1
100 TABLET ORAL 3 TIMES DAILY
Qty: 90 TABLET | Refills: 2 | Status: SHIPPED | OUTPATIENT
Start: 2017-11-09 | End: 2017-11-30

## 2017-11-09 NOTE — PROGRESS NOTES
[facility] AND [facility] LOSS CLIN[facility]  [address] [place] [address]  Dept: [phone]  Loc: [phone]    Date: 10/13/2016    Patient:  Almas Ferrer  :      6/15/1990  MRN:      [id number]    Referrin mg SC once daily x 7 days Week 2: 1.2 mg SC once daily x 7 days Week 3: 1.8 mg SC once daily x 7 days Week 4: 2.4 mg SC once daily x 7 days Week 5: 3.0 mg SC once daily x 7 days, Disp: 5 pen, Rfl: 3  •  Insulin Pen Needle (NOVOFINE) 32G X 6 MM Does not nilesh breastfeeding. General appearance: alert, appears stated age and cooperative  Head: Normocephalic, without obvious abnormality, atraumatic  Eyes: conjunctivae/corneas clear. PERRL, EOM's intact. Fundi benign. Back: symmetric, no curvature.  ROM normal. No

## 2017-11-10 ENCOUNTER — HOSPITAL ENCOUNTER (OUTPATIENT)
Dept: GENERAL RADIOLOGY | Facility: HOSPITAL | Age: 27
Discharge: HOME | End: 2017-11-10
Attending: ORTHOPAEDIC SURGERY
Payer: COMMERCIAL

## 2017-11-10 ENCOUNTER — HOSPITAL ENCOUNTER (OUTPATIENT)
Dept: MRI IMAGING | Facility: HOSPITAL | Age: 27
Discharge: HOME | End: 2017-11-10
Attending: ORTHOPAEDIC SURGERY
Payer: COMMERCIAL

## 2017-11-10 DIAGNOSIS — S70.02XD CONTUSION OF LEFT HIP, SUBSEQUENT ENCOUNTER: ICD-10-CM

## 2017-11-10 PROCEDURE — 77002 NEEDLE LOCALIZATION BY XRAY: CPT | Performed by: ORTHOPAEDIC SURGERY

## 2017-11-10 PROCEDURE — 73722 MRI JOINT OF LWR EXTR W/DYE: CPT | Performed by: ORTHOPAEDIC SURGERY

## 2017-11-10 PROCEDURE — 20610 DRAIN/INJ JOINT/BURSA W/O US: CPT | Performed by: ORTHOPAEDIC SURGERY

## 2017-11-10 PROCEDURE — 27093 INJECTION FOR HIP X-RAY: CPT | Performed by: ORTHOPAEDIC SURGERY

## 2017-11-10 PROCEDURE — A9575 INJ GADOTERATE MEGLUMI 0.1ML: HCPCS | Performed by: ORTHOPAEDIC SURGERY

## 2017-11-10 PROCEDURE — 73525 CONTRAST X-RAY OF HIP: CPT | Performed by: ORTHOPAEDIC SURGERY

## 2017-11-10 RX ORDER — ROPIVACAINE HYDROCHLORIDE 5 MG/ML
10 INJECTION, SOLUTION EPIDURAL; INFILTRATION; PERINEURAL ONCE
Status: COMPLETED | OUTPATIENT
Start: 2017-11-10 | End: 2017-11-10

## 2017-11-10 RX ORDER — LIDOCAINE HYDROCHLORIDE 20 MG/ML
INJECTION, SOLUTION EPIDURAL; INFILTRATION; INTRACAUDAL; PERINEURAL
Status: COMPLETED
Start: 2017-11-10 | End: 2017-11-10

## 2017-11-10 RX ORDER — LIDOCAINE HYDROCHLORIDE 20 MG/ML
5 INJECTION, SOLUTION EPIDURAL; INFILTRATION; INTRACAUDAL; PERINEURAL ONCE
Status: COMPLETED | OUTPATIENT
Start: 2017-11-10 | End: 2017-11-10

## 2017-11-10 RX ADMIN — LIDOCAINE HYDROCHLORIDE 5 ML: 20 INJECTION, SOLUTION EPIDURAL; INFILTRATION; INTRACAUDAL; PERINEURAL at 10:45:00

## 2017-11-10 RX ADMIN — ROPIVACAINE HYDROCHLORIDE 10 ML: 5 INJECTION, SOLUTION EPIDURAL; INFILTRATION; PERINEURAL at 10:45:00

## 2017-11-21 ENCOUNTER — OFFICE VISIT (OUTPATIENT)
Dept: INTERNAL MEDICINE CLINIC | Facility: CLINIC | Age: 27
End: 2017-11-21

## 2017-11-21 VITALS
DIASTOLIC BLOOD PRESSURE: 70 MMHG | RESPIRATION RATE: 14 BRPM | BODY MASS INDEX: 28.42 KG/M2 | SYSTOLIC BLOOD PRESSURE: 114 MMHG | TEMPERATURE: 98 F | HEIGHT: 71 IN | OXYGEN SATURATION: 98 % | WEIGHT: 203 LBS | HEART RATE: 90 BPM

## 2017-11-21 DIAGNOSIS — S73.192A TEAR OF LEFT ACETABULAR LABRUM, INITIAL ENCOUNTER: ICD-10-CM

## 2017-11-21 DIAGNOSIS — Z01.818 PREOP EXAMINATION: Primary | ICD-10-CM

## 2017-11-21 DIAGNOSIS — Z98.84 STATUS POST BARIATRIC SURGERY: ICD-10-CM

## 2017-11-21 PROCEDURE — 93000 ELECTROCARDIOGRAM COMPLETE: CPT | Performed by: FAMILY MEDICINE

## 2017-11-21 PROCEDURE — 99213 OFFICE O/P EST LOW 20 MIN: CPT | Performed by: FAMILY MEDICINE

## 2017-11-21 RX ORDER — TRAMADOL HYDROCHLORIDE 50 MG/1
TABLET ORAL
Refills: 0 | COMMUNITY
Start: 2017-11-14 | End: 2018-01-25

## 2017-11-21 RX ORDER — ACETAMINOPHEN AND CODEINE PHOSPHATE 300; 30 MG/1; MG/1
1 TABLET ORAL EVERY 6 HOURS PRN
Qty: 100 TABLET | Refills: 0 | Status: SHIPPED | OUTPATIENT
Start: 2017-11-21 | End: 2018-01-25

## 2017-11-21 NOTE — PROGRESS NOTES
Alvaroblossom Jacqueline is a 33year old female.   Patient presents with:  Pre-Op Exam: Pt presents to clinic for pre-op exam pt will be having bilateral brachioplasty and bilateral thigh lift with posssible liposuction to bilateral thighs on 12/13/2017      HPI • Diabetes Father          Current Outpatient Prescriptions:  TraMADol HCl 50 MG Oral Tab TAKE 1 TABLET BY MOUTH EVERY 4 TO 6 HOURS AS NEEDED FOR PAIN Disp:  Rfl: 0   BuPROPion HCl 100 MG Oral Tab Take 1 tablet (100 mg total) by mouth 3 (three) times nae 114/70  11/09/17 : 119/74  10/26/17 : 109/68  09/27/17 : 114/71  07/27/17 : 118/74  07/13/17 : 118/63      Wt Readings from Last 6 Encounters:  11/21/17 : 203 lb  11/09/17 : 211 lb 8 oz  10/26/17 : 205 lb  09/27/17 : 208 lb  07/27/17 : 223 lb 9 oz  07/13/1

## 2017-11-28 RX ORDER — PANTOPRAZOLE SODIUM 40 MG/1
40 TABLET, DELAYED RELEASE ORAL 2 TIMES DAILY
Qty: 180 TABLET | Refills: 1 | Status: SHIPPED | OUTPATIENT
Start: 2017-11-28 | End: 2017-12-29

## 2017-11-28 NOTE — TELEPHONE ENCOUNTER
Pending Prescriptions Disp Refills    Pantoprazole Sodium 40 MG Oral Tab  tablet 5     Sig: Take 1 tablet (40 mg total) by mouth 2 (two) times daily. See refill request  Patient last seen 2-10-17.    Medication last refilled 6-14-17

## 2017-11-28 NOTE — TELEPHONE ENCOUNTER
Patient last seen in office in February 2017. She will be due for an annual office visit in February 2018. Rx refilled until that time.

## 2017-12-29 ENCOUNTER — TELEPHONE (OUTPATIENT)
Dept: SURGERY | Facility: CLINIC | Age: 27
End: 2017-12-29

## 2017-12-29 NOTE — TELEPHONE ENCOUNTER
Patient would like Protonic and needles sent to [facility] order Instead of [facility]

## 2018-01-18 DIAGNOSIS — E66.3 OVERWEIGHT (BMI 25.0-29.9): ICD-10-CM

## 2018-01-19 RX ORDER — BUPROPION HYDROCHLORIDE 100 MG/1
TABLET ORAL
Qty: 90 TABLET | OUTPATIENT
Start: 2018-01-19

## 2018-01-25 ENCOUNTER — OFFICE VISIT (OUTPATIENT)
Dept: FAMILY MEDICINE CLINIC | Facility: CLINIC | Age: 28
End: 2018-01-25

## 2018-01-25 VITALS
TEMPERATURE: 99 F | OXYGEN SATURATION: 98 % | WEIGHT: 197 LBS | DIASTOLIC BLOOD PRESSURE: 80 MMHG | BODY MASS INDEX: 27.58 KG/M2 | RESPIRATION RATE: 12 BRPM | HEIGHT: 71 IN | HEART RATE: 98 BPM | SYSTOLIC BLOOD PRESSURE: 110 MMHG

## 2018-01-25 DIAGNOSIS — Z01.818 PRE-OPERATIVE EXAMINATION: Primary | ICD-10-CM

## 2018-01-25 DIAGNOSIS — S73.192S ACETABULAR LABRUM TEAR, LEFT, SEQUELA: ICD-10-CM

## 2018-01-25 PROCEDURE — 99213 OFFICE O/P EST LOW 20 MIN: CPT | Performed by: FAMILY MEDICINE
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
## 2018-02-08 ENCOUNTER — OFFICE VISIT (OUTPATIENT)
Dept: SURGERY | Facility: CLINIC | Age: 28
End: 2018-02-08

## 2018-02-08 VITALS
BODY MASS INDEX: 27.77 KG/M2 | WEIGHT: 198.38 LBS | HEIGHT: 71 IN | DIASTOLIC BLOOD PRESSURE: 70 MMHG | SYSTOLIC BLOOD PRESSURE: 120 MMHG

## 2018-02-08 DIAGNOSIS — R53.83 FATIGUE, UNSPECIFIED TYPE: ICD-10-CM

## 2018-02-08 DIAGNOSIS — L30.4 INTERTRIGO: ICD-10-CM

## 2018-02-08 DIAGNOSIS — Z98.890 S/P GASTROPLASTY: ICD-10-CM

## 2018-02-08 DIAGNOSIS — M54.6 ACUTE MIDLINE THORACIC BACK PAIN: ICD-10-CM

## 2018-02-08 DIAGNOSIS — E44.1 PROTEIN-CALORIE MALNUTRITION, MILD (HCC): Primary | ICD-10-CM

## 2018-02-08 DIAGNOSIS — E66.9 OBESITY (BMI 30-39.9): ICD-10-CM

## 2018-02-08 DIAGNOSIS — E78.1 HYPERTRIGLYCERIDEMIA: ICD-10-CM

## 2018-02-08 DIAGNOSIS — E66.3 OVERWEIGHT (BMI 25.0-29.9): ICD-10-CM

## 2018-02-08 PROCEDURE — 99214 OFFICE O/P EST MOD 30 MIN: CPT | Performed by: INTERNAL MEDICINE

## 2018-02-08 NOTE — PROGRESS NOTES
[facility] [facility] AND WEIGHT LOSS CLINIC  [address] [place] [address]  Dept: [phone]  Loc: [phone]    Date: 18      Patient:  Porsche Boone  :      6/15/1990  MRN:      [id number]    Referrin •  Fexofenadine HCl (ALLEGRA OR), Take 1 tablet by mouth daily. , Disp: , Rfl:   •  Multiple Vitamins-Minerals (MULTIVITAMIN OR), Take by mouth., Disp: , Rfl:   •  Fluticasone Propionate (FLONASE) 50 MCG/ACT Nasal Suspension, 2 sprays by Each Nare route d negative  Cardiovascular: negative  Gastrointestinal: positive for diarrhea  Musculoskeletal:negative  Neurological: positive for headaches  Behavioral/Psych: negative  Endocrine: negative  All other systems were reviewed and are negative    Assessment

## 2018-02-16 DIAGNOSIS — E66.3 OVERWEIGHT (BMI 25.0-29.9): ICD-10-CM

## 2018-02-17 ENCOUNTER — TELEPHONE (OUTPATIENT)
Dept: INTERNAL MEDICINE CLINIC | Facility: CLINIC | Age: 28
End: 2018-02-17

## 2018-02-18 NOTE — TELEPHONE ENCOUNTER
Had surgery with general anesthetic in [place] for labral tear 3 days ago-states she gained 30 lbs  In fluid last 3 days; fluid distributed slightly more over left leg area tho generally over all her body.  Only new meds are Norco, Senekot; did not start Zo

## 2018-02-19 ENCOUNTER — APPOINTMENT (OUTPATIENT)
Dept: ULTRASOUND IMAGING | Facility: HOSPITAL | Age: 28
End: 2018-02-19
Attending: EMERGENCY MEDICINE
Payer: COMMERCIAL

## 2018-02-19 ENCOUNTER — HOSPITAL ENCOUNTER (EMERGENCY)
Facility: HOSPITAL | Age: 28
Discharge: HOME | End: 2018-02-19
Attending: EMERGENCY MEDICINE
Payer: COMMERCIAL

## 2018-02-19 VITALS
BODY MASS INDEX: 32 KG/M2 | WEIGHT: 232 LBS | OXYGEN SATURATION: 98 % | DIASTOLIC BLOOD PRESSURE: 77 MMHG | HEART RATE: 80 BPM | RESPIRATION RATE: 16 BRPM | SYSTOLIC BLOOD PRESSURE: 124 MMHG | TEMPERATURE: 99 F

## 2018-02-19 DIAGNOSIS — R60.9 PERIPHERAL EDEMA: Primary | ICD-10-CM

## 2018-02-19 LAB
ANION GAP SERPL CALC-SCNC: 6 MMOL/L (ref 0–18)
BASOPHILS # BLD: 0 K/UL (ref 0–0.2)
BASOPHILS NFR BLD: 0 %
BUN SERPL-MCNC: 11 MG/DL (ref 8–20)
BUN/CREAT SERPL: 13.3 (ref 10–20)
CALCIUM SERPL-MCNC: 8.6 MG/DL (ref 8.5–10.5)
CHLORIDE SERPL-SCNC: 107 MMOL/L (ref 95–110)
CO2 SERPL-SCNC: 30 MMOL/L (ref 22–32)
CREAT SERPL-MCNC: 0.83 MG/DL (ref 0.5–1.5)
EOSINOPHIL # BLD: 0.4 K/UL (ref 0–0.7)
EOSINOPHIL NFR BLD: 6 %
ERYTHROCYTE [DISTWIDTH] IN BLOOD BY AUTOMATED COUNT: 12.7 % (ref 11–15)
GLUCOSE SERPL-MCNC: 77 MG/DL (ref 70–99)
HCT VFR BLD AUTO: 35.4 % (ref 35–48)
HGB BLD-MCNC: 11.6 G/DL (ref 12–16)
LYMPHOCYTES # BLD: 1.9 K/UL (ref 1–4)
LYMPHOCYTES NFR BLD: 29 %
MCH RBC QN AUTO: 29.5 PG (ref 27–32)
MCHC RBC AUTO-ENTMCNC: 32.9 G/DL (ref 32–37)
MCV RBC AUTO: 89.9 FL (ref 80–100)
MONOCYTES # BLD: 0.4 K/UL (ref 0–1)
MONOCYTES NFR BLD: 6 %
NEUTROPHILS # BLD AUTO: 3.8 K/UL (ref 1.8–7.7)
NEUTROPHILS NFR BLD: 58 %
OSMOLALITY UR CALC.SUM OF ELEC: 294 MOSM/KG (ref 275–295)
PLATELET # BLD AUTO: 232 K/UL (ref 140–400)
PMV BLD AUTO: 8.3 FL (ref 7.4–10.3)
POTASSIUM SERPL-SCNC: 3.4 MMOL/L (ref 3.3–5.1)
RBC # BLD AUTO: 3.94 M/UL (ref 3.7–5.4)
SODIUM SERPL-SCNC: 143 MMOL/L (ref 136–144)
WBC # BLD AUTO: 6.5 K/UL (ref 4–11)

## 2018-02-19 PROCEDURE — 36415 COLL VENOUS BLD VENIPUNCTURE: CPT

## 2018-02-19 PROCEDURE — 85025 COMPLETE CBC W/AUTO DIFF WBC: CPT | Performed by: EMERGENCY MEDICINE

## 2018-02-19 PROCEDURE — 99284 EMERGENCY DEPT VISIT MOD MDM: CPT

## 2018-02-19 PROCEDURE — 93970 EXTREMITY STUDY: CPT | Performed by: EMERGENCY MEDICINE

## 2018-02-19 PROCEDURE — 80048 BASIC METABOLIC PNL TOTAL CA: CPT | Performed by: EMERGENCY MEDICINE

## 2018-02-19 RX ORDER — HYDROCODONE BITARTRATE AND ACETAMINOPHEN 7.5; 325 MG/1; MG/1
1 TABLET ORAL EVERY 6 HOURS PRN
COMMUNITY
End: 2018-03-29 | Stop reason: ALTCHOICE

## 2018-02-19 RX ORDER — FUROSEMIDE 20 MG/1
20 TABLET ORAL DAILY
Qty: 3 TABLET | Refills: 0 | Status: SHIPPED | OUTPATIENT
Start: 2018-02-19 | End: 2018-02-22

## 2018-02-19 RX ORDER — BUPROPION HYDROCHLORIDE 100 MG/1
TABLET ORAL
Qty: 90 TABLET | OUTPATIENT
Start: 2018-02-19

## 2018-02-19 RX ORDER — POTASSIUM CHLORIDE 1.5 G/1.77G
40 POWDER, FOR SOLUTION ORAL DAILY
Qty: 6 PACKET | Refills: 0 | Status: SHIPPED | OUTPATIENT
Start: 2018-02-19 | End: 2018-02-22

## 2018-02-19 NOTE — ED INITIAL ASSESSMENT (HPI)
Per pt, told to come in by [facility] surgeon for r/o blood clots to both legs. Both legs swollen. Recent surgery for torn labrum to left leg.

## 2018-02-20 NOTE — ED PROVIDER NOTES
Patient Seen in: [facility] AND [facility] Emergency Department    History   Patient presents with:  Lower Extremity Injury (musculoskeletal)    Stated Complaint: bilateral leg swelling s/p hip surgery 2/14/18, rule out blood clot sent from d*    [facility]    Patient 1 tablet (40 mg total) by mouth 2 (two) times daily. Levonorgestrel (MIRENA, 52 MG, IU),  by Intrauterine route. PREVIDENT 5000 ENAMEL PROTECT 1.1-5 % Dental Paste,  Use as directed   Biotin 5000 MCG Oral Cap,  Take by mouth.    Fexofenadine HCl (ALLEGR seromas as result of recent surgery, followed by plastic surgery, drained 2 weeks ago, recurred within 1 day  NEURO: alert and oiented *3, 2-12 intact, no focal deficit noted  SKIN:warm dry no erythema  PSYCH: calm, cooperative,    Differential includes: D Prescribed:  Discharge Medication List as of 2/19/2018  1:09 PM    START taking these medications    furosemide 20 MG Oral Tab  Take 1 tablet (20 mg total) by mouth daily. , Normal, Disp-3 tablet, R-0    potassium chloride 20 MEQ Oral Powd Pack  Take 40 mEq

## 2018-02-21 ENCOUNTER — TELEPHONE (OUTPATIENT)
Dept: SURGERY | Facility: CLINIC | Age: 28
End: 2018-02-21

## 2018-02-21 ENCOUNTER — TELEPHONE (OUTPATIENT)
Dept: INTERNAL MEDICINE CLINIC | Facility: CLINIC | Age: 28
End: 2018-02-21

## 2018-02-21 RX ORDER — DOCUSATE SODIUM 100 MG/1
CAPSULE, LIQUID FILLED ORAL
Refills: 0 | COMMUNITY
Start: 2018-02-14 | End: 2018-03-29 | Stop reason: ALTCHOICE

## 2018-02-21 NOTE — TELEPHONE ENCOUNTER
Patient is feeling very swollen was given lasix at the ED for the edema last pill today not reducing the swelling, She does not know waht to do specialty wants her to see PCP but she cannot find a time to schedule the appointment as [place] is [place] to [place]

## 2018-02-21 NOTE — TELEPHONE ENCOUNTER
Dr. Kris Reynoso,   Patient has not been able to fill Zoloft through her mail order pharmacy. Can you please send the Rx to [facility] in [place]? Thank you.

## 2018-03-29 ENCOUNTER — TELEPHONE (OUTPATIENT)
Dept: FAMILY MEDICINE CLINIC | Facility: CLINIC | Age: 28
End: 2018-03-29

## 2018-03-29 ENCOUNTER — OFFICE VISIT (OUTPATIENT)
Dept: FAMILY MEDICINE CLINIC | Facility: CLINIC | Age: 28
End: 2018-03-29

## 2018-03-29 VITALS
HEIGHT: 72 IN | TEMPERATURE: 98 F | OXYGEN SATURATION: 98 % | HEART RATE: 87 BPM | WEIGHT: 197 LBS | SYSTOLIC BLOOD PRESSURE: 90 MMHG | DIASTOLIC BLOOD PRESSURE: 60 MMHG | RESPIRATION RATE: 20 BRPM | BODY MASS INDEX: 26.68 KG/M2

## 2018-03-29 DIAGNOSIS — K29.00 OTHER ACUTE GASTRITIS WITHOUT HEMORRHAGE: Primary | ICD-10-CM

## 2018-03-29 PROCEDURE — 99213 OFFICE O/P EST LOW 20 MIN: CPT

## 2018-03-29 RX ORDER — METRONIDAZOLE 500 MG/1
500 TABLET ORAL 3 TIMES DAILY
Qty: 21 TABLET | Refills: 0 | Status: SHIPPED | OUTPATIENT
Start: 2018-03-29 | End: 2018-04-05

## 2018-03-29 RX ORDER — CIPROFLOXACIN 500 MG/1
500 TABLET, FILM COATED ORAL 2 TIMES DAILY
Qty: 14 TABLET | Refills: 0 | Status: SHIPPED | OUTPATIENT
Start: 2018-03-29 | End: 2018-04-05

## 2018-03-29 RX ORDER — TRAMADOL HYDROCHLORIDE 50 MG/1
TABLET ORAL
Refills: 0 | COMMUNITY
Start: 2018-03-07 | End: 2018-06-18 | Stop reason: ALTCHOICE

## 2018-03-29 RX ORDER — DICYCLOMINE HYDROCHLORIDE 10 MG/1
10 CAPSULE ORAL 4 TIMES DAILY PRN
Qty: 28 CAPSULE | Refills: 0 | Status: SHIPPED | OUTPATIENT
Start: 2018-03-29 | End: 2018-04-08

## 2018-03-29 RX ORDER — BUPROPION HYDROCHLORIDE 100 MG/1
100 TABLET ORAL
COMMUNITY
End: 2018-05-10 | Stop reason: ALTCHOICE

## 2018-03-29 RX ORDER — DIAZEPAM 5 MG/1
TABLET ORAL
Refills: 0 | COMMUNITY
Start: 2018-02-21 | End: 2018-06-18 | Stop reason: ALTCHOICE

## 2018-03-29 RX ORDER — CLINDAMYCIN HYDROCHLORIDE 300 MG/1
CAPSULE ORAL
Refills: 0 | COMMUNITY
Start: 2018-03-06 | End: 2018-03-29 | Stop reason: ALTCHOICE

## 2018-03-29 RX ORDER — FEXOFENADINE HCL 180 MG/1
180 TABLET ORAL AS NEEDED
COMMUNITY
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
## 2018-04-30 ENCOUNTER — HOSPITAL ENCOUNTER (OUTPATIENT)
Dept: ULTRASOUND IMAGING | Facility: HOSPITAL | Age: 28
Discharge: HOME | End: 2018-04-30
Attending: UROLOGY
Payer: COMMERCIAL

## 2018-04-30 DIAGNOSIS — N28.1 RENAL CYST: ICD-10-CM

## 2018-04-30 PROCEDURE — 76770 US EXAM ABDO BACK WALL COMP: CPT | Performed by: UROLOGY

## 2018-05-05 ENCOUNTER — HOSPITAL (OUTPATIENT)
Dept: OTHER | Age: 28
End: 2018-05-05
Attending: EMERGENCY MEDICINE

## 2018-05-05 LAB
ANALYZER ANC (IANC): ABNORMAL
ANION GAP SERPL CALC-SCNC: 11 MMOL/L (ref 10–20)
BASOPHILS # BLD: 0 THOUSAND/MCL (ref 0–0.3)
BASOPHILS NFR BLD: 0 %
BUN SERPL-MCNC: 27 MG/DL (ref 6–20)
BUN/CREAT SERPL: 30 (ref 7–25)
CALCIUM SERPL-MCNC: 9.1 MG/DL (ref 8.4–10.2)
CHLORIDE: 107 MMOL/L (ref 98–107)
CO2 SERPL-SCNC: 28 MMOL/L (ref 21–32)
CREAT SERPL-MCNC: 0.91 MG/DL (ref 0.51–0.95)
DIFFERENTIAL METHOD BLD: ABNORMAL
EOSINOPHIL # BLD: 0.1 THOUSAND/MCL (ref 0.1–0.5)
EOSINOPHIL NFR BLD: 2 %
ERYTHROCYTE [DISTWIDTH] IN BLOOD: 12.2 % (ref 11–15)
GLUCOSE SERPL-MCNC: 96 MG/DL (ref 65–99)
HEMATOCRIT: 35.4 % (ref 36–46.5)
HGB BLD-MCNC: 12 GM/DL (ref 12–15.5)
LYMPHOCYTES # BLD: 2.4 THOUSAND/MCL (ref 1–4.8)
LYMPHOCYTES NFR BLD: 42 %
MCH RBC QN AUTO: 29.6 PG (ref 26–34)
MCHC RBC AUTO-ENTMCNC: 33.9 GM/DL (ref 32–36.5)
MCV RBC AUTO: 87.4 FL (ref 78–100)
MONOCYTES # BLD: 0.2 THOUSAND/MCL (ref 0.3–0.9)
MONOCYTES NFR BLD: 4 %
NEUTROPHILS # BLD: 3 THOUSAND/MCL (ref 1.8–7.7)
NEUTROPHILS NFR BLD: 52 %
NEUTS SEG NFR BLD: ABNORMAL %
PERCENT NRBC: ABNORMAL
PLATELET # BLD: 196 THOUSAND/MCL (ref 140–450)
POTASSIUM SERPL-SCNC: 3.2 MMOL/L (ref 3.4–5.1)
RBC # BLD: 4.05 MILLION/MCL (ref 4–5.2)
SODIUM SERPL-SCNC: 143 MMOL/L (ref 135–145)
TSH SERPL-ACNC: 5.58 MCUNIT/ML (ref 0.35–5)
WBC # BLD: 5.7 THOUSAND/MCL (ref 4.2–11)

## 2018-05-10 PROBLEM — E87.6 HYPOKALEMIA: Status: ACTIVE | Noted: 2018-05-10

## 2018-05-10 PROBLEM — F43.9 STRESS: Status: ACTIVE | Noted: 2018-05-10

## 2018-05-10 NOTE — PROGRESS NOTES
[address] [place] [address] AND WEIGHT LOSS CLINIC  [address] [place] [address]  Dept: [phone]  Loc: [phone]    Date: 18      Patient:  Branden John  :      6/15/1990  MRN:      [id number]    Referkylah the skin daily.  3.0 mg SC once daily x 7 days, Disp: 5 pen, Rfl: 3  •  Insulin Pen Needle (NOVOFINE) 32G X 6 MM Does not apply Misc, Use a new needle with each use, Disp: 1 Box, Rfl: 3  •  Levonorgestrel (MIRENA, 52 MG, IU), by Intrauterine route., Disp: , or gallop, regular rate and rhythm  Abdomen: soft, incisions are healed well. large pannus noted  Extremities: extremities normal, atraumatic, no cyanosis or edema  Pulses: 2+ and symmetric  Skin: redness noted under skin folds of pannus   Scars noted unde

## 2018-06-01 ENCOUNTER — OFFICE VISIT (OUTPATIENT)
Dept: SURGERY | Facility: CLINIC | Age: 28
End: 2018-06-01

## 2018-06-01 VITALS
HEIGHT: 72 IN | HEART RATE: 77 BPM | WEIGHT: 193.25 LBS | RESPIRATION RATE: 16 BRPM | SYSTOLIC BLOOD PRESSURE: 118 MMHG | DIASTOLIC BLOOD PRESSURE: 81 MMHG | BODY MASS INDEX: 26.18 KG/M2 | OXYGEN SATURATION: 99 %

## 2018-06-01 DIAGNOSIS — E78.1 HYPERTRIGLYCERIDEMIA: Primary | ICD-10-CM

## 2018-06-01 DIAGNOSIS — E44.1 PROTEIN-CALORIE MALNUTRITION, MILD (HCC): ICD-10-CM

## 2018-06-01 DIAGNOSIS — Z98.890 S/P GASTROPLASTY: ICD-10-CM

## 2018-06-01 DIAGNOSIS — E66.3 OVERWEIGHT (BMI 25.0-29.9): ICD-10-CM

## 2018-06-01 DIAGNOSIS — K21.00 GASTROESOPHAGEAL REFLUX DISEASE WITH ESOPHAGITIS: ICD-10-CM

## 2018-06-01 PROCEDURE — 99214 OFFICE O/P EST MOD 30 MIN: CPT | Performed by: INTERNAL MEDICINE

## 2018-06-01 RX ORDER — PHENTERMINE HYDROCHLORIDE 37.5 MG/1
37.5 TABLET ORAL
Qty: 30 TABLET | Refills: 2 | Status: SHIPPED | OUTPATIENT
Start: 2018-06-01 | End: 2018-11-19

## 2018-06-01 NOTE — PROGRESS NOTES
[facility] AND WE[facility] LOSS CLINIC  [address] [place] [address]  Dept: [phone]  Loc: [phone]    Date: 18      Patient:  Heaven Dickens  :      6/15/1990  MRN:      [id number]    Referrin Solution Pen-injector, Inject 3 mg into the skin daily.  3.0 mg SC once daily x 7 days, Disp: 5 pen, Rfl: 3  •  Insulin Pen Needle (NOVOFINE) 32G X 6 MM Does not apply Misc, Use a new needle with each use, Disp: 1 Box, Rfl: 3  •  Levonorgestrel (MIRENA, 52 bilaterally  Heart: S1, S2 normal, no murmur, click, rub or gallop, regular rate and rhythm  Abdomen: soft, non-tender; bowel sounds normal; no masses,  no organomegaly  Extremities: extremities normal, atraumatic, no cyanosis or edema  Pulses: 2+ and symm

## 2018-06-07 RX ORDER — PANTOPRAZOLE SODIUM 40 MG/1
TABLET, DELAYED RELEASE ORAL
Qty: 180 TABLET | Refills: 0 | Status: SHIPPED | OUTPATIENT
Start: 2018-06-07 | End: 2018-08-26

## 2018-06-18 ENCOUNTER — OFFICE VISIT (OUTPATIENT)
Dept: FAMILY MEDICINE CLINIC | Facility: CLINIC | Age: 28
End: 2018-06-18

## 2018-06-18 VITALS
SYSTOLIC BLOOD PRESSURE: 118 MMHG | TEMPERATURE: 99 F | OXYGEN SATURATION: 98 % | RESPIRATION RATE: 12 BRPM | HEART RATE: 90 BPM | BODY MASS INDEX: 28.17 KG/M2 | HEIGHT: 72 IN | DIASTOLIC BLOOD PRESSURE: 68 MMHG | WEIGHT: 208 LBS

## 2018-06-18 DIAGNOSIS — J01.20 ACUTE NON-RECURRENT ETHMOIDAL SINUSITIS: Primary | ICD-10-CM

## 2018-06-18 PROBLEM — M53.3 COCCYDYNIA: Status: ACTIVE | Noted: 2017-05-08

## 2018-06-18 PROCEDURE — 99213 OFFICE O/P EST LOW 20 MIN: CPT | Performed by: NURSE PRACTITIONER

## 2018-06-18 RX ORDER — DOXYCYCLINE HYCLATE 100 MG
100 TABLET ORAL 2 TIMES DAILY
Qty: 14 TABLET | Refills: 0 | Status: SHIPPED | OUTPATIENT
Start: 2018-06-18 | End: 2018-06-25

## 2018-06-18 NOTE — PROGRESS NOTES
CHIEF COMPLAINT:   Patient presents with:  URI      HPI:   Carli Ferro is a 33year old female who presents for cold symptoms for  1  weeks.  Symptoms have progressed into sinus congestion and been worsening since her return flight home yesterday fro Fluticasone Propionate (FLONASE) 50 MCG/ACT Nasal Suspension 2 sprays by Each Nare route daily.  Disp: 1 Inhaler Rfl: 1      Past Medical History:   Diagnosis Date   • Calculus of kidney     small stones, not causing pain   • Depression     stress related EYES: conjunctiva clear, EOM intact  EARS: TM's clear, + bulging, no retraction, + fluid, bony landmarks present  NOSE: nostrils patent, purulent nasal mucous, nasal mucosa reddened and swollen  THROAT: oral mucosa pink, moist. No visible dental caries.  Po · Salt water gargles (1 tsp. Salt in 6 oz lukewarm water), use several times daily to help decrease swelling and pain. · Use humidified air, steamy showers/baths and use vaporizer in sleeping quarters to keep secretions thin.     · Saline nasal spray to no · Over-the-counter decongestants may be used unless a similar medicine was prescribed. Nasal sprays work the fastest. Use one that contains phenylephrine or oxymetazoline. First blow the nose gently. Then use the spray.  Do not use these medicines more ofte © 8821-0960 [employer] [address]. [facility]. All rights reserved. This information is not intended as a substitute for professional medical care. Always follow your healthcare professional's instructions.

## 2018-06-18 NOTE — PATIENT INSTRUCTIONS
· Take antibiotics as directed. Finish all the medication even if you feel better. · Probiotics or yogurt daily during antibiotic use will help decrease stomach upset and restore good bacteria to the gut. Separate times by at least 2-4 hours.     · Hydrat drainage. · Heat may help soothe painful areas of the face. Use a towel soaked in hot water. Or,  the shower and direct the hot spray onto your face.  Using a vaporizer along with a menthol rub at night may also help.   · An expectorant containing blurred or double vision  · Fever of 100.4ºF (38ºC) or higher, or as directed by your healthcare provider  · Seizure  · Breathing problems  · Symptoms not resolving within 10 days  Date Last Reviewed: 4/13/2015  © 8079-9403 [employer] [address].  800 T

## 2018-08-03 ENCOUNTER — OFFICE VISIT (OUTPATIENT)
Dept: FAMILY MEDICINE CLINIC | Facility: CLINIC | Age: 28
End: 2018-08-03

## 2018-08-03 VITALS
HEART RATE: 92 BPM | DIASTOLIC BLOOD PRESSURE: 82 MMHG | OXYGEN SATURATION: 99 % | TEMPERATURE: 98 F | SYSTOLIC BLOOD PRESSURE: 128 MMHG

## 2018-08-03 DIAGNOSIS — J32.8 OTHER CHRONIC SINUSITIS: Primary | ICD-10-CM

## 2018-08-03 DIAGNOSIS — Z02.9 ADMINISTRATIVE ENCOUNTER: ICD-10-CM

## 2018-08-03 DIAGNOSIS — F41.9 ANXIOUSNESS: ICD-10-CM

## 2018-08-03 RX ORDER — PREDNISONE 1 MG/1
TABLET ORAL
COMMUNITY
Start: 2018-07-23 | End: 2018-08-22 | Stop reason: ALTCHOICE

## 2018-08-03 NOTE — PROGRESS NOTES
Patient presents to [facility] for concerns of the following issues:    1.  Seen June 2018 for sinusitis treated with doxycycline initially at [facility], followed by bactrim and prednisone by allergist. Patient reports ongoing ear pain, sore throat, sinus congestion- 8

## 2018-08-21 NOTE — PROGRESS NOTES
HPI:    Patient ID: Claudell Cummins is a 33year old female. Anxiety   This is a new problem. Episode onset: > 1mo. The problem has been rapidly worsening. Pertinent negatives include no abdominal pain or fever.  The symptoms are aggravated by stress Take 1 tablet by mouth. Disp:  Rfl:    Fexofenadine HCl 180 MG Oral Tab Take 1 tablet by mouth. Disp:  Rfl:    Levonorgestrel (MIRENA, 52 MG, IU) by Intrauterine route.  Disp:  Rfl:    PREVIDENT 5000 ENAMEL PROTECT 1.1-5 % Dental Paste Use as directed Disp: have enough time since leaving in 4 days, but advised to continue therapy when return  Continue Zoloft. Add  Alprazolam 0.25 mgs QD prn for anxiety and panic attack. Patient wants her labs done at [facility] only.      Orders Placed This Encounter      TSH

## 2018-08-22 ENCOUNTER — OFFICE VISIT (OUTPATIENT)
Dept: DERMATOLOGY CLINIC | Facility: CLINIC | Age: 28
End: 2018-08-22
Payer: COMMERCIAL

## 2018-08-22 DIAGNOSIS — D23.5 BENIGN NEOPLASM OF SKIN OF TRUNK, EXCEPT SCROTUM: ICD-10-CM

## 2018-08-22 DIAGNOSIS — L70.0 ACNE VULGARIS: Primary | ICD-10-CM

## 2018-08-22 DIAGNOSIS — D23.4 BENIGN NEOPLASM OF SCALP AND SKIN OF NECK: ICD-10-CM

## 2018-08-22 DIAGNOSIS — D23.60 BENIGN NEOPLASM OF SKIN OF UPPER LIMB, INCLUDING SHOULDER, UNSPECIFIED LATERALITY: ICD-10-CM

## 2018-08-22 PROCEDURE — 99213 OFFICE O/P EST LOW 20 MIN: CPT | Performed by: DERMATOLOGY

## 2018-08-22 PROCEDURE — 99212 OFFICE O/P EST SF 10 MIN: CPT | Performed by: DERMATOLOGY

## 2018-08-27 RX ORDER — PANTOPRAZOLE SODIUM 40 MG/1
TABLET, DELAYED RELEASE ORAL
Qty: 180 TABLET | Refills: 0 | Status: SHIPPED | OUTPATIENT
Start: 2018-08-27 | End: 2018-11-05
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
## 2018-09-10 RX ORDER — PHENTERMINE HYDROCHLORIDE 37.5 MG/1
37.5 TABLET ORAL
COMMUNITY
End: 2018-09-10

## 2018-09-10 RX ORDER — PHENTERMINE HYDROCHLORIDE 37.5 MG/1
37.5 TABLET ORAL
Qty: 30 TABLET | Refills: 1 | Status: SHIPPED | OUTPATIENT
Start: 2018-09-10 | End: 2018-10-10
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
## 2018-09-11 ENCOUNTER — TELEPHONE (OUTPATIENT)
Dept: SURGERY | Facility: CLINIC | Age: 28
End: 2018-09-11

## 2018-09-11 NOTE — TELEPHONE ENCOUNTER
Called phentermine 37.5mg tab with 1 refill [facility] per Dr. Kris Reynoso

## 2018-09-17 ENCOUNTER — OFFICE VISIT (OUTPATIENT)
Dept: FAMILY MEDICINE CLINIC | Facility: CLINIC | Age: 28
End: 2018-09-17
Payer: COMMERCIAL

## 2018-09-17 VITALS
OXYGEN SATURATION: 97 % | BODY MASS INDEX: 27.77 KG/M2 | DIASTOLIC BLOOD PRESSURE: 70 MMHG | RESPIRATION RATE: 14 BRPM | HEART RATE: 111 BPM | HEIGHT: 72 IN | TEMPERATURE: 98 F | WEIGHT: 205 LBS | SYSTOLIC BLOOD PRESSURE: 120 MMHG

## 2018-09-17 DIAGNOSIS — M54.2 NECK PAIN: Primary | ICD-10-CM

## 2018-09-17 PROCEDURE — 99213 OFFICE O/P EST LOW 20 MIN: CPT | Performed by: PHYSICIAN ASSISTANT

## 2018-09-17 NOTE — PROGRESS NOTES
CHIEF COMPLAINT:   Patient presents with:  Ear Pain: possible, Q tip cotton stuck in R ear.  pain to R side neck area underneath R ear     HPI:   Cheryl Green is a 33year old female who presents to clinic today with complaints of possible foreign b Disp: 90 tablet Rfl: 1   Probiotic Product (PROBIOTIC DAILY OR) Take 1 tablet by mouth. Disp:  Rfl:    Fexofenadine HCl 180 MG Oral Tab Take 1 tablet by mouth. Disp:  Rfl:    Levonorgestrel (MIRENA, 52 MG, IU) by Intrauterine route.  Disp:  Rfl:    PREVIDEN bulging, no retraction, no effusion, bony landmarks sharp. Left TM: pearly, no bulging, no retraction,no effusion, bony landmarks sharp.   NOSE: nostrils patent, no nasal discharge, nasal mucosa pink and noninflamed  THROAT: oral mucosa pink, moist. Poste

## 2018-09-17 NOTE — PATIENT INSTRUCTIONS
No foreign body in ear  Otherwise nl exam  Advised ibuprofen and heat to right side of neck. If no improvement then to f/u with PCP.

## 2018-09-28 ENCOUNTER — TELEPHONE (OUTPATIENT)
Dept: INTERNAL MEDICINE CLINIC | Facility: CLINIC | Age: 28
End: 2018-09-28

## 2018-09-28 NOTE — TELEPHONE ENCOUNTER
School is looking for immunization documentation other than just a list of vaccines received. Printed out [employer] for childhood vaccines and the list she has in her chart with [facility].  Will mail out per her request.

## 2018-10-05 ENCOUNTER — TELEPHONE (OUTPATIENT)
Dept: INTERNAL MEDICINE CLINIC | Facility: CLINIC | Age: 28
End: 2018-10-05

## 2018-10-05 NOTE — TELEPHONE ENCOUNTER
Patient will be OOT for . Has been able to get appt to see psychiatrist on 10/16/18. Would like to see Dr. Herber Mendosa before then, if possible. Appt made for 10/10/18. She is requesting a letter of absence for at least 3 weeks.  If unable to fax to employ

## 2018-10-05 NOTE — TELEPHONE ENCOUNTER
Can you please find out how long she wants to take off? I can have letter for her Monday. If anxiety is really bad, I can also see her in office SDA ok.  Thank you

## 2018-10-05 NOTE — TELEPHONE ENCOUNTER
Pt called has been anxious and extremely depressed. Had a death in the family 2 days ago. Would like to take some time off from work.  Request letter for work for time off  Fax: [phone]  ATT: Maryann Kowalski

## 2018-10-10 ENCOUNTER — OFFICE VISIT (OUTPATIENT)
Dept: INTERNAL MEDICINE CLINIC | Facility: CLINIC | Age: 28
End: 2018-10-10
Payer: COMMERCIAL

## 2018-10-10 VITALS
HEART RATE: 104 BPM | WEIGHT: 205 LBS | SYSTOLIC BLOOD PRESSURE: 118 MMHG | OXYGEN SATURATION: 98 % | DIASTOLIC BLOOD PRESSURE: 76 MMHG | TEMPERATURE: 98 F | HEIGHT: 72 IN | BODY MASS INDEX: 27.77 KG/M2

## 2018-10-10 DIAGNOSIS — F32.A DEPRESSION, UNSPECIFIED DEPRESSION TYPE: Primary | ICD-10-CM

## 2018-10-10 PROCEDURE — 99213 OFFICE O/P EST LOW 20 MIN: CPT | Performed by: FAMILY MEDICINE

## 2018-10-10 NOTE — PROGRESS NOTES
Orlando Jacqueline is a 33year old female.   HPI: anxiety   Patient's comes for follow up for: anxiety/ depression    zoloft inc to 150 mg last visit ( 2 weeks ago) not much difference yet  Stopped xanax- thinks makes thoughts darker     Very close uncle j Biotin 5000 MCG Oral Cap Take by mouth. Disp:  Rfl:    Multiple Vitamins-Minerals (MULTIVITAMIN OR) Take by mouth. Disp:  Rfl:    Fluticasone Propionate (FLONASE) 50 MCG/ACT Nasal Suspension 2 sprays by Each Nare route daily.  Disp: 1 Inhaler Rfl: 1 discussed with pt  Follow up sooner if feeling worse.

## 2018-10-15 ENCOUNTER — TELEPHONE (OUTPATIENT)
Dept: INTERNAL MEDICINE CLINIC | Facility: CLINIC | Age: 28
End: 2018-10-15

## 2018-10-15 NOTE — TELEPHONE ENCOUNTER
We did fax right Roseann? And can you tell her we can't get anything via email.  She will have to fax [facility] ppw. ty

## 2018-10-15 NOTE — TELEPHONE ENCOUNTER
Patient wants to know if Dr. Venu Lundy fax over a letter that her work is asking for and she also wants to send via email FMLA paperwork, please call her back.

## 2018-10-16 ENCOUNTER — MED REC SCAN ONLY (OUTPATIENT)
Dept: INTERNAL MEDICINE CLINIC | Facility: CLINIC | Age: 28
End: 2018-10-16

## 2018-10-23 RX ORDER — SERTRALINE HYDROCHLORIDE 100 MG/1
TABLET, FILM COATED ORAL
Qty: 90 TABLET | Refills: 1 | OUTPATIENT
Start: 2018-10-23

## 2018-10-24 ENCOUNTER — OFFICE VISIT (OUTPATIENT)
Dept: INTERNAL MEDICINE CLINIC | Facility: CLINIC | Age: 28
End: 2018-10-24
Payer: COMMERCIAL

## 2018-10-24 VITALS
WEIGHT: 205 LBS | TEMPERATURE: 99 F | DIASTOLIC BLOOD PRESSURE: 60 MMHG | HEART RATE: 80 BPM | SYSTOLIC BLOOD PRESSURE: 124 MMHG | BODY MASS INDEX: 27.77 KG/M2 | OXYGEN SATURATION: 98 % | HEIGHT: 72 IN

## 2018-10-24 DIAGNOSIS — F32.A DEPRESSION, UNSPECIFIED DEPRESSION TYPE: Primary | ICD-10-CM

## 2018-10-24 PROCEDURE — 99213 OFFICE O/P EST LOW 20 MIN: CPT | Performed by: FAMILY MEDICINE

## 2018-10-24 NOTE — PROGRESS NOTES
Porsche Boone is a 33year old female.   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 Disp:  Rfl:    PREVIDENT 5000 ENAMEL PROTECT 1.1-5 % Dental Paste Use as directed Disp:  Rfl: 5   Biotin 5000 MCG Oral Cap Take by mouth. Disp:  Rfl:    Multiple Vitamins-Minerals (MULTIVITAMIN OR) Take by mouth.  Disp:  Rfl:    Fluticasone Propionat with pt  Follow up sooner if feeling worse.

## 2018-10-26 ENCOUNTER — HOSPITAL ENCOUNTER (EMERGENCY)
Facility: HOSPITAL | Age: 28
Discharge: HOME | End: 2018-10-26
Attending: EMERGENCY MEDICINE
Payer: COMMERCIAL

## 2018-10-26 VITALS
SYSTOLIC BLOOD PRESSURE: 108 MMHG | OXYGEN SATURATION: 98 % | WEIGHT: 205 LBS | DIASTOLIC BLOOD PRESSURE: 74 MMHG | HEART RATE: 78 BPM | RESPIRATION RATE: 17 BRPM | HEIGHT: 72 IN | BODY MASS INDEX: 27.77 KG/M2 | TEMPERATURE: 98 F

## 2018-10-26 DIAGNOSIS — T81.30XA WOUND DEHISCENCE: ICD-10-CM

## 2018-10-26 DIAGNOSIS — T78.40XA ALLERGIC REACTION, INITIAL ENCOUNTER: Primary | ICD-10-CM

## 2018-10-26 PROCEDURE — 99284 EMERGENCY DEPT VISIT MOD MDM: CPT

## 2018-10-26 NOTE — ED NOTES
Dr Earl Swenson at bedside, suturing of the lacerated wound on the right thigh, per pt she had skin removal revision yesterday.

## 2018-10-26 NOTE — ED INITIAL ASSESSMENT (HPI)
Pt reports that she developed an itchy red rash to her trunk and bilateral arms for the last 30 minutes. States that she had a a skin removal revision surgery under local anesthetic yesterday at 1100.  Denies facial swelling, tongue swelling, or itchy throa

## 2018-10-26 NOTE — ED NOTES
assumed care to this pt, pt seen on bed scratching her left and right upper arm and shoulder , back and face, pt denies any shortness of breath, pt states she is not sure wha she is allergic to.per she was at her surgeon yesterday for revision of her skin

## 2018-10-26 NOTE — ED PROVIDER NOTES
Patient Seen in: [facility] AND [facility] Emergency Department    History   Patient presents with:  Rash Skin Problem (integumentary)      HPI    Patient presents to the ED complaining of an itchy red rash to her arms and legs for the past 30 minutes.   She stat polycystic kidney   • Diabetes Father        Smoking Status: Social History    Socioeconomic History      Marital status: Single      Spouse name: Not on file      Number of children: Not on file      Years of education: Not on file      Highest education and time. She appears well-developed and well-nourished. No distress. HENT:   Head: Normocephalic and atraumatic. Mouth/Throat: Oropharynx is clear and moist.   Eyes: Conjunctivae are normal. Right eye exhibits no discharge.  Left eye exhibits no discha Lower extremity edema; Hypertriglyceridemia; Status post bariatric surgery; PKD (polycystic kidney disease); Protein-calorie malnutrition, mild ([facility] [address] Atelectasis of both lungs; S/P gastroplasty; Electrolyte imbalance;  Headache; GERD (gastroesophageal refl

## 2018-11-05 RX ORDER — PANTOPRAZOLE SODIUM 40 MG/1
TABLET, DELAYED RELEASE ORAL
Qty: 180 TABLET | Refills: 0 | Status: SHIPPED | OUTPATIENT
Start: 2018-11-05 | End: 2018-12-29

## 2018-11-06 ENCOUNTER — OFFICE VISIT (OUTPATIENT)
Dept: FAMILY MEDICINE CLINIC | Facility: CLINIC | Age: 28
End: 2018-11-06

## 2018-11-06 DIAGNOSIS — Z02.9 ENCOUNTERS FOR ADMINISTRATIVE PURPOSES: Primary | ICD-10-CM

## 2018-11-06 NOTE — PROGRESS NOTES
S:  Patient reports to clinic for suture removal.  Patient was seen by derm 10/25 for skin removal surgery to her medial right knee. She was then subsequently seen in the ED for allergic reaction and while in ER noticed that wound had opened.    Wound was

## 2018-11-13 ENCOUNTER — MED REC SCAN ONLY (OUTPATIENT)
Dept: INTERNAL MEDICINE CLINIC | Facility: CLINIC | Age: 28
End: 2018-11-13

## 2018-11-13 RX ORDER — VENLAFAXINE 50 MG/1
50 TABLET ORAL 2 TIMES DAILY
Qty: 60 TABLET | Refills: 0 | Status: SHIPPED | OUTPATIENT
Start: 2018-11-13 | End: 2019-06-17 | Stop reason: ALTCHOICE

## 2018-11-13 NOTE — TELEPHONE ENCOUNTER
Patient called requesting refill on medication, generic form of  EFFEXOR 50mg (NOT ER). Prescribed by psychiatrist she has seen and is on maintenance now.   Will not refill until she sees the doctor, but each time she made an appt, it was cancelled by them

## 2018-11-20 ENCOUNTER — TELEPHONE (OUTPATIENT)
Dept: SURGERY | Facility: CLINIC | Age: 28
End: 2018-11-20

## 2018-11-20 RX ORDER — PHENTERMINE HYDROCHLORIDE 37.5 MG/1
TABLET ORAL
Qty: 30 TABLET | Refills: 2 | Status: SHIPPED | OUTPATIENT
Start: 2018-11-20 | End: 2019-07-17 | Stop reason: ALTCHOICE

## 2018-11-30 ENCOUNTER — TELEPHONE (OUTPATIENT)
Dept: SURGERY | Facility: CLINIC | Age: 28
End: 2018-11-30

## 2018-12-17 RX ORDER — ALPRAZOLAM 0.25 MG/1
0.25 TABLET ORAL NIGHTLY PRN
Qty: 60 TABLET | Refills: 0 | Status: SHIPPED | OUTPATIENT
Start: 2018-12-17 | End: 2019-06-17 | Stop reason: ALTCHOICE

## 2018-12-31 RX ORDER — PHENTERMINE HYDROCHLORIDE 37.5 MG/1
37.5 TABLET ORAL
Qty: 30 TABLET | Refills: 3 | Status: SHIPPED | OUTPATIENT
Start: 2018-12-31 | End: 2019-08-15

## 2018-12-31 RX ORDER — POTASSIUM CHLORIDE 750 MG/1
TABLET, EXTENDED RELEASE ORAL
Qty: 90 TABLET | Refills: 0 | Status: SHIPPED | OUTPATIENT
Start: 2018-12-31 | End: 2019-03-28

## 2018-12-31 RX ORDER — PANTOPRAZOLE SODIUM 40 MG/1
TABLET, DELAYED RELEASE ORAL
Qty: 180 TABLET | Refills: 0 | Status: SHIPPED | OUTPATIENT
Start: 2018-12-31 | End: 2019-03-28

## 2019-02-11 ENCOUNTER — TELEPHONE (OUTPATIENT)
Dept: SURGERY | Facility: CLINIC | Age: 29
End: 2019-02-11

## 2019-03-28 RX ORDER — PANTOPRAZOLE SODIUM 40 MG/1
TABLET, DELAYED RELEASE ORAL
Qty: 180 TABLET | Refills: 0 | Status: SHIPPED | OUTPATIENT
Start: 2019-03-28 | End: 2019-06-05

## 2019-03-28 RX ORDER — POTASSIUM CHLORIDE 750 MG/1
TABLET, EXTENDED RELEASE ORAL
Qty: 90 TABLET | Refills: 0 | Status: SHIPPED | OUTPATIENT
Start: 2019-03-28 | End: 2019-06-17 | Stop reason: ALTCHOICE

## 2019-04-12 DIAGNOSIS — Z98.890 S/P GASTROPLASTY: ICD-10-CM

## 2019-04-12 DIAGNOSIS — E66.9 OBESITY (BMI 30-39.9): ICD-10-CM

## 2019-04-12 DIAGNOSIS — E78.1 HYPERTRIGLYCERIDEMIA: ICD-10-CM

## 2019-04-12 DIAGNOSIS — E44.1 PROTEIN-CALORIE MALNUTRITION, MILD (HCC): ICD-10-CM

## 2019-04-17 ENCOUNTER — TELEPHONE (OUTPATIENT)
Dept: SURGERY | Facility: CLINIC | Age: 29
End: 2019-04-17

## 2019-04-17 NOTE — TELEPHONE ENCOUNTER
Per Dr Nazario Wynn Phentermine 37.5 mg tablet, #30 one time only. Patient must make an appt for future RX.

## 2019-04-27 DIAGNOSIS — E66.9 OBESITY (BMI 30-39.9): ICD-10-CM

## 2019-04-27 DIAGNOSIS — Z98.890 S/P GASTROPLASTY: ICD-10-CM

## 2019-04-27 DIAGNOSIS — E44.1 PROTEIN-CALORIE MALNUTRITION, MILD (HCC): ICD-10-CM

## 2019-04-27 DIAGNOSIS — E78.1 HYPERTRIGLYCERIDEMIA: ICD-10-CM

## 2019-04-27 DIAGNOSIS — E66.3 OVERWEIGHT (BMI 25.0-29.9): ICD-10-CM

## 2019-04-29 RX ORDER — LIRAGLUTIDE 6 MG/ML
INJECTION, SOLUTION SUBCUTANEOUS
Qty: 15 PEN | Refills: 3 | Status: SHIPPED | OUTPATIENT
Start: 2019-04-29 | End: 2019-11-25

## 2019-05-16 ENCOUNTER — TELEPHONE (OUTPATIENT)
Dept: SURGERY | Facility: CLINIC | Age: 29
End: 2019-05-16

## 2019-05-16 NOTE — TELEPHONE ENCOUNTER
Patient called per the advice of Mail in [facility], [employer], to call and notify Dr. Tahir Escamilla that refill of Phetermine was shipped to patient but lost in transit by [employer]. [facility] needs a new order to re-ship medication since patient did not get the medication delivered to her. Please send new prescription to [employer]. Pharmacy will also be reaching out to office to confirm this. Any questions, please call patient at [phone].

## 2019-05-17 ENCOUNTER — TELEPHONE (OUTPATIENT)
Dept: SURGERY | Facility: CLINIC | Age: 29
End: 2019-05-17

## 2019-05-17 NOTE — TELEPHONE ENCOUNTER
Phentermine rx was lost by [place] postal service. [facility] People is requesting authorization for refill OK per Dr. Marii Betancourt. Spoke with Kari Hampton the pharmacist lost rx will be returned if located.

## 2019-05-21 ENCOUNTER — HOSPITAL ENCOUNTER (EMERGENCY)
Facility: HOSPITAL | Age: 29
Discharge: HOME | End: 2019-05-21
Attending: EMERGENCY MEDICINE
Payer: COMMERCIAL

## 2019-05-21 VITALS
HEIGHT: 72 IN | SYSTOLIC BLOOD PRESSURE: 104 MMHG | DIASTOLIC BLOOD PRESSURE: 52 MMHG | TEMPERATURE: 98 F | HEART RATE: 77 BPM | OXYGEN SATURATION: 99 % | BODY MASS INDEX: 2.3 KG/M2 | RESPIRATION RATE: 20 BRPM | WEIGHT: 17 LBS

## 2019-05-21 DIAGNOSIS — G97.1 SPINAL HEADACHE: Primary | ICD-10-CM

## 2019-05-21 PROCEDURE — 99283 EMERGENCY DEPT VISIT LOW MDM: CPT

## 2019-05-21 RX ORDER — ONDANSETRON 4 MG/1
4 TABLET, ORALLY DISINTEGRATING ORAL EVERY 4 HOURS PRN
Qty: 10 TABLET | Refills: 0 | Status: SHIPPED | OUTPATIENT
Start: 2019-05-21 | End: 2019-05-28

## 2019-05-21 RX ORDER — HYDROCODONE BITARTRATE AND ACETAMINOPHEN 5; 325 MG/1; MG/1
1-2 TABLET ORAL EVERY 6 HOURS PRN
Qty: 10 TABLET | Refills: 0 | Status: SHIPPED | OUTPATIENT
Start: 2019-05-21 | End: 2019-05-28

## 2019-05-21 NOTE — ED INITIAL ASSESSMENT (HPI)
Pt has steroid spinal epidural Friday r/t chronic back pain. Pain 8-9/10.  Took tylenol around 10pm. Pain better when laying flat

## 2019-05-22 NOTE — ED NOTES
Anesthesiologist at [facility] for blood patch and patient refused procedure. Dr rubio aware.

## 2019-05-22 NOTE — ED PROVIDER NOTES
Patient Seen in: [facility] AND [facility] Emergency Department    History   Patient presents with:  Headache (neurologic)    Stated Complaint: headache from spinal epidural    HPI    Patient is a 80-year-old female who presents to the emergency department with History    Tobacco Use      Smoking status: Never Smoker      Smokeless tobacco: Never Used    Alcohol use:  Yes      Alcohol/week: 0.0 oz      Comment: very little amount and rarely    Drug use: No      Review of Systems    Positive for stated complaint: h prescription for Norco and Zofran. MDM                         Disposition and Plan     Clinical Impression:  Spinal headache  (primary encounter diagnosis)    Disposition:  Discharge  5/21/2019  8:14 pm    Follow-up:  Danuta Bone MD  [address]

## 2019-06-05 RX ORDER — PANTOPRAZOLE SODIUM 40 MG/1
TABLET, DELAYED RELEASE ORAL
Qty: 180 TABLET | Refills: 0 | Status: SHIPPED | OUTPATIENT
Start: 2019-06-05 | End: 2021-08-25

## 2019-06-19 ENCOUNTER — APPOINTMENT (OUTPATIENT)
Dept: LAB | Facility: HOSPITAL | Age: 29
End: 2019-06-19
Attending: INTERNAL MEDICINE
Payer: COMMERCIAL

## 2019-06-19 ENCOUNTER — OFFICE VISIT (OUTPATIENT)
Dept: SURGERY | Facility: CLINIC | Age: 29
End: 2019-06-19
Payer: COMMERCIAL

## 2019-06-19 VITALS
DIASTOLIC BLOOD PRESSURE: 73 MMHG | HEIGHT: 72 IN | RESPIRATION RATE: 16 BRPM | HEART RATE: 86 BPM | BODY MASS INDEX: 30.07 KG/M2 | SYSTOLIC BLOOD PRESSURE: 109 MMHG | WEIGHT: 222 LBS | OXYGEN SATURATION: 100 %

## 2019-06-19 DIAGNOSIS — E66.9 OBESITY (BMI 30-39.9): ICD-10-CM

## 2019-06-19 DIAGNOSIS — K21.9 GASTRIC REFLUX: ICD-10-CM

## 2019-06-19 DIAGNOSIS — F43.9 STRESS: ICD-10-CM

## 2019-06-19 DIAGNOSIS — R53.83 FATIGUE, UNSPECIFIED TYPE: ICD-10-CM

## 2019-06-19 DIAGNOSIS — R63.2 BINGE EATING: ICD-10-CM

## 2019-06-19 DIAGNOSIS — Z98.890 S/P GASTROPLASTY: ICD-10-CM

## 2019-06-19 DIAGNOSIS — R63.5 WEIGHT GAIN: ICD-10-CM

## 2019-06-19 DIAGNOSIS — K21.9 GASTRIC REFLUX: Primary | ICD-10-CM

## 2019-06-19 PROCEDURE — 93005 ELECTROCARDIOGRAM TRACING: CPT

## 2019-06-19 PROCEDURE — 99214 OFFICE O/P EST MOD 30 MIN: CPT | Performed by: INTERNAL MEDICINE

## 2019-06-19 PROCEDURE — 93010 ELECTROCARDIOGRAM REPORT: CPT | Performed by: INTERNAL MEDICINE

## 2019-06-19 NOTE — PROGRESS NOTES
[facility] AND WEIGHT LOSS CLINIC  [address] [place] [address]  Dept: [phone]  Loc: [phone]    Date: 18      Patient:  Ray Jha  :      6/15/1990  MRN:      [id number]    Referkylah 37.5 MG Oral Tab, Take 1 tablet (37.5 mg total) by mouth every morning before breakfast., Disp: 30 tablet, Rfl: 3  •  PHENTERMINE HCL 37.5 MG Oral Tab, TAKE 1 TABLET BY MOUTH EVERY MORNING BEFORE BREAKFAST, Disp: 30 tablet, Rfl: 2  •  Insulin Pen Needle (N Tami Larson MD at [facility] 1 LIPOSUCTION Bilateral 12/13/2017    Performed by Tami Larson MD at [address] [place] [address]       Family History:    Family History   Problem Relation Age of Onset   • Kidney Disease Father         polycystic kidney   • gina    Would recommend following up with Dr Jackie Bruce for possible EGD    S/P hip surgery      No orders of the defined types were placed in this encounter.       Brigid Mendoza MD

## 2019-06-21 ENCOUNTER — APPOINTMENT (OUTPATIENT)
Dept: CT IMAGING | Facility: HOSPITAL | Age: 29
End: 2019-06-21
Attending: EMERGENCY MEDICINE
Payer: COMMERCIAL

## 2019-06-21 ENCOUNTER — HOSPITAL ENCOUNTER (EMERGENCY)
Facility: HOSPITAL | Age: 29
Discharge: HOME | End: 2019-06-22
Attending: EMERGENCY MEDICINE
Payer: COMMERCIAL

## 2019-06-21 VITALS
HEART RATE: 88 BPM | OXYGEN SATURATION: 100 % | RESPIRATION RATE: 16 BRPM | WEIGHT: 215 LBS | BODY MASS INDEX: 29.12 KG/M2 | DIASTOLIC BLOOD PRESSURE: 76 MMHG | SYSTOLIC BLOOD PRESSURE: 123 MMHG | HEIGHT: 72 IN | TEMPERATURE: 98 F

## 2019-06-21 DIAGNOSIS — R10.9 ABDOMINAL PAIN, ACUTE: Primary | ICD-10-CM

## 2019-06-21 PROCEDURE — 99285 EMERGENCY DEPT VISIT HI MDM: CPT

## 2019-06-21 PROCEDURE — 85025 COMPLETE CBC W/AUTO DIFF WBC: CPT | Performed by: EMERGENCY MEDICINE

## 2019-06-21 PROCEDURE — 87086 URINE CULTURE/COLONY COUNT: CPT | Performed by: EMERGENCY MEDICINE

## 2019-06-21 PROCEDURE — 81025 URINE PREGNANCY TEST: CPT

## 2019-06-21 PROCEDURE — 96375 TX/PRO/DX INJ NEW DRUG ADDON: CPT

## 2019-06-21 PROCEDURE — 96374 THER/PROPH/DIAG INJ IV PUSH: CPT

## 2019-06-21 PROCEDURE — 85025 COMPLETE CBC W/AUTO DIFF WBC: CPT

## 2019-06-21 PROCEDURE — 81001 URINALYSIS AUTO W/SCOPE: CPT

## 2019-06-21 PROCEDURE — 74177 CT ABD & PELVIS W/CONTRAST: CPT | Performed by: EMERGENCY MEDICINE

## 2019-06-21 PROCEDURE — 96361 HYDRATE IV INFUSION ADD-ON: CPT

## 2019-06-21 PROCEDURE — 99284 EMERGENCY DEPT VISIT MOD MDM: CPT

## 2019-06-21 PROCEDURE — 80053 COMPREHEN METABOLIC PANEL: CPT

## 2019-06-21 PROCEDURE — 80053 COMPREHEN METABOLIC PANEL: CPT | Performed by: EMERGENCY MEDICINE

## 2019-06-21 PROCEDURE — 83690 ASSAY OF LIPASE: CPT | Performed by: EMERGENCY MEDICINE

## 2019-06-21 PROCEDURE — 83690 ASSAY OF LIPASE: CPT

## 2019-06-21 PROCEDURE — 81001 URINALYSIS AUTO W/SCOPE: CPT | Performed by: EMERGENCY MEDICINE

## 2019-06-21 RX ORDER — ONDANSETRON 2 MG/ML
4 INJECTION INTRAMUSCULAR; INTRAVENOUS ONCE
Status: COMPLETED | OUTPATIENT
Start: 2019-06-21 | End: 2019-06-21

## 2019-06-21 RX ORDER — MORPHINE SULFATE 4 MG/ML
4 INJECTION, SOLUTION INTRAMUSCULAR; INTRAVENOUS
Status: DISCONTINUED | OUTPATIENT
Start: 2019-06-21 | End: 2019-06-22

## 2019-06-21 RX ORDER — LORAZEPAM 2 MG/ML
0.5 INJECTION INTRAMUSCULAR ONCE
Status: COMPLETED | OUTPATIENT
Start: 2019-06-21 | End: 2019-06-21

## 2019-06-21 RX ORDER — HYDROMORPHONE HYDROCHLORIDE 1 MG/ML
1 INJECTION, SOLUTION INTRAMUSCULAR; INTRAVENOUS; SUBCUTANEOUS ONCE
Status: COMPLETED | OUTPATIENT
Start: 2019-06-21 | End: 2019-06-21

## 2019-06-21 RX ORDER — LORAZEPAM 2 MG/ML
INJECTION INTRAMUSCULAR
Status: COMPLETED
Start: 2019-06-21 | End: 2019-06-21

## 2019-06-21 RX ORDER — SODIUM CHLORIDE 9 MG/ML
INJECTION, SOLUTION INTRAVENOUS CONTINUOUS
Status: DISCONTINUED | OUTPATIENT
Start: 2019-06-21 | End: 2019-06-22

## 2019-06-22 RX ORDER — SUCRALFATE 1 G/1
1 TABLET ORAL
Qty: 56 TABLET | Refills: 0 | Status: SHIPPED | OUTPATIENT
Start: 2019-06-22 | End: 2019-07-09

## 2019-06-22 NOTE — ED NOTES
Patient less restless but is still moaning in pain. Patient reports that the pain intensity is getting better after ativan has been administered. MD notified.

## 2019-06-22 NOTE — ED NOTES
Patient yelling in room and very restless in bed. Patient reports that her pain is the worst it has every been. MD notified.

## 2019-06-22 NOTE — ED PROVIDER NOTES
Patient Seen in: [facility] Emergency Department    History   Patient presents with:  Abdomen/Flank Pain (GI/)    Stated Complaint: abd pain x1 week    HPI    29 yo fm with c/o RUQ abd pain n/v. H/o gastric sleeve. Recent travel to [place].  Pt was jeffery No      Review of Systems    Positive for stated complaint: abd pain x1 week  Other systems are as noted in HPI. Constitutional and vital signs reviewed. All other systems reviewed and negative except as noted above.     Physical Exam     ED Triage Vi orders were created for panel order CBC WITH DIFFERENTIAL WITH PLATELET.   Procedure                               Abnormality         Status                     ---------                               -----------         ------                     CBC W/ D medications found. Please discuss with provider.

## 2019-06-22 NOTE — ED INITIAL ASSESSMENT (HPI)
Pt presents to the ED with complaints of abdominal pain and vomiting for approximately 10 days. Pt returned from [place] at that time. Pt was seen at [address] [place] [address] a few days ago, sent home with carafate.  Pt does have a gastric sleeve, scheduled for endoscopy on 7/9, c/

## 2019-07-20 ENCOUNTER — LAB ENCOUNTER (OUTPATIENT)
Dept: LAB | Facility: HOSPITAL | Age: 29
End: 2019-07-20
Attending: INTERNAL MEDICINE
Payer: COMMERCIAL

## 2019-07-20 DIAGNOSIS — F33.1 MODERATE EPISODE OF RECURRENT MAJOR DEPRESSIVE DISORDER (HCC): ICD-10-CM

## 2019-07-20 DIAGNOSIS — F41.9 ANXIETY: ICD-10-CM

## 2019-07-20 LAB
ALBUMIN SERPL-MCNC: 4 G/DL (ref 3.4–5)
ALBUMIN/GLOB SERPL: 1.2 {RATIO} (ref 1–2)
ALP LIVER SERPL-CCNC: 38 U/L (ref 37–98)
ALT SERPL-CCNC: 19 U/L (ref 13–56)
ANION GAP SERPL CALC-SCNC: 8 MMOL/L (ref 0–18)
AST SERPL-CCNC: 13 U/L (ref 15–37)
BASOPHILS # BLD AUTO: 0.02 X10(3) UL (ref 0–0.2)
BASOPHILS NFR BLD AUTO: 0.4 %
BILIRUB SERPL-MCNC: 0.8 MG/DL (ref 0.1–2)
BUN BLD-MCNC: 22 MG/DL (ref 7–18)
BUN/CREAT SERPL: 28.2 (ref 10–20)
CALCIUM BLD-MCNC: 9 MG/DL (ref 8.5–10.1)
CHLORIDE SERPL-SCNC: 105 MMOL/L (ref 98–112)
CO2 SERPL-SCNC: 27 MMOL/L (ref 21–32)
CREAT BLD-MCNC: 0.78 MG/DL (ref 0.55–1.02)
DEPRECATED RDW RBC AUTO: 38.6 FL (ref 35.1–46.3)
EOSINOPHIL # BLD AUTO: 0.08 X10(3) UL (ref 0–0.7)
EOSINOPHIL NFR BLD AUTO: 1.6 %
ERYTHROCYTE [DISTWIDTH] IN BLOOD BY AUTOMATED COUNT: 11.4 % (ref 11–15)
GLOBULIN PLAS-MCNC: 3.3 G/DL (ref 2.8–4.4)
GLUCOSE BLD-MCNC: 80 MG/DL (ref 70–99)
HCT VFR BLD AUTO: 40.2 % (ref 35–48)
HGB BLD-MCNC: 13.1 G/DL (ref 12–16)
IMM GRANULOCYTES # BLD AUTO: 0.01 X10(3) UL (ref 0–1)
IMM GRANULOCYTES NFR BLD: 0.2 %
LYMPHOCYTES # BLD AUTO: 1.76 X10(3) UL (ref 1–4)
LYMPHOCYTES NFR BLD AUTO: 34.9 %
M PROTEIN MFR SERPL ELPH: 7.3 G/DL (ref 6.4–8.2)
MCH RBC QN AUTO: 29.9 PG (ref 26–34)
MCHC RBC AUTO-ENTMCNC: 32.6 G/DL (ref 31–37)
MCV RBC AUTO: 91.8 FL (ref 80–100)
MONOCYTES # BLD AUTO: 0.27 X10(3) UL (ref 0.1–1)
MONOCYTES NFR BLD AUTO: 5.3 %
NEUTROPHILS # BLD AUTO: 2.91 X10 (3) UL (ref 1.5–7.7)
NEUTROPHILS # BLD AUTO: 2.91 X10(3) UL (ref 1.5–7.7)
NEUTROPHILS NFR BLD AUTO: 57.6 %
OSMOLALITY SERPL CALC.SUM OF ELEC: 292 MOSM/KG (ref 275–295)
PATIENT FASTING: YES
PLATELET # BLD AUTO: 200 10(3)UL (ref 150–450)
POTASSIUM SERPL-SCNC: 4.4 MMOL/L (ref 3.5–5.1)
RBC # BLD AUTO: 4.38 X10(6)UL (ref 3.8–5.3)
SODIUM SERPL-SCNC: 140 MMOL/L (ref 136–145)
TSI SER-ACNC: 1.95 MIU/ML (ref 0.36–3.74)
WBC # BLD AUTO: 5.1 X10(3) UL (ref 4–11)

## 2019-07-20 PROCEDURE — 80053 COMPREHEN METABOLIC PANEL: CPT

## 2019-07-20 PROCEDURE — 85025 COMPLETE CBC W/AUTO DIFF WBC: CPT

## 2019-07-20 PROCEDURE — 84443 ASSAY THYROID STIM HORMONE: CPT

## 2019-07-20 PROCEDURE — 36415 COLL VENOUS BLD VENIPUNCTURE: CPT

## 2019-07-22 ENCOUNTER — ANESTHESIA (OUTPATIENT)
Dept: ENDOSCOPY | Facility: HOSPITAL | Age: 29
End: 2019-07-22

## 2019-07-22 ENCOUNTER — ANESTHESIA EVENT (OUTPATIENT)
Dept: ENDOSCOPY | Facility: HOSPITAL | Age: 29
End: 2019-07-22

## 2019-07-22 ENCOUNTER — HOSPITAL ENCOUNTER (OUTPATIENT)
Facility: HOSPITAL | Age: 29
Setting detail: HOSPITAL OUTPATIENT SURGERY
Discharge: HOME | End: 2019-07-22
Attending: INTERNAL MEDICINE | Admitting: INTERNAL MEDICINE
Payer: COMMERCIAL

## 2019-07-22 VITALS
TEMPERATURE: 99 F | DIASTOLIC BLOOD PRESSURE: 71 MMHG | HEIGHT: 72 IN | OXYGEN SATURATION: 100 % | BODY MASS INDEX: 29.26 KG/M2 | WEIGHT: 216 LBS | RESPIRATION RATE: 20 BRPM | HEART RATE: 81 BPM | SYSTOLIC BLOOD PRESSURE: 101 MMHG

## 2019-07-22 DIAGNOSIS — K83.8 DILATION OF BILIARY TRACT: ICD-10-CM

## 2019-07-22 DIAGNOSIS — R11.2 INTRACTABLE VOMITING WITH NAUSEA, UNSPECIFIED VOMITING TYPE: ICD-10-CM

## 2019-07-22 DIAGNOSIS — R10.10 UPPER ABDOMINAL PAIN: ICD-10-CM

## 2019-07-22 DIAGNOSIS — Z87.19 HISTORY OF GALLSTONES: ICD-10-CM

## 2019-07-22 DIAGNOSIS — R11.0 NAUSEA: ICD-10-CM

## 2019-07-22 LAB
POCT LOT NUMBER: NORMAL
POCT URINE PREGNANCY: NEGATIVE

## 2019-07-22 PROCEDURE — 0DJ08ZZ INSPECTION OF UPPER INTESTINAL TRACT, VIA NATURAL OR ARTIFICIAL OPENING ENDOSCOPIC: ICD-10-PCS | Performed by: INTERNAL MEDICINE

## 2019-07-22 PROCEDURE — 0DB98ZX EXCISION OF DUODENUM, VIA NATURAL OR ARTIFICIAL OPENING ENDOSCOPIC, DIAGNOSTIC: ICD-10-PCS | Performed by: INTERNAL MEDICINE

## 2019-07-22 PROCEDURE — BD47ZZZ ULTRASONOGRAPHY OF GASTROINTESTINAL TRACT: ICD-10-PCS | Performed by: INTERNAL MEDICINE

## 2019-07-22 PROCEDURE — 81025 URINE PREGNANCY TEST: CPT | Performed by: INTERNAL MEDICINE

## 2019-07-22 PROCEDURE — 0DB68ZX EXCISION OF STOMACH, VIA NATURAL OR ARTIFICIAL OPENING ENDOSCOPIC, DIAGNOSTIC: ICD-10-PCS | Performed by: INTERNAL MEDICINE

## 2019-07-22 PROCEDURE — 88305 TISSUE EXAM BY PATHOLOGIST: CPT | Performed by: INTERNAL MEDICINE

## 2019-07-22 RX ORDER — NALOXONE HYDROCHLORIDE 0.4 MG/ML
80 INJECTION, SOLUTION INTRAMUSCULAR; INTRAVENOUS; SUBCUTANEOUS AS NEEDED
Status: DISCONTINUED | OUTPATIENT
Start: 2019-07-22 | End: 2019-07-22

## 2019-07-22 RX ORDER — SODIUM CHLORIDE, SODIUM LACTATE, POTASSIUM CHLORIDE, CALCIUM CHLORIDE 600; 310; 30; 20 MG/100ML; MG/100ML; MG/100ML; MG/100ML
INJECTION, SOLUTION INTRAVENOUS CONTINUOUS
Status: DISCONTINUED | OUTPATIENT
Start: 2019-07-22 | End: 2019-07-22

## 2019-07-22 NOTE — ANESTHESIA POSTPROCEDURE EVALUATION
Jose M Lake Patient Status:  Hospital Outpatient Surgery   Age/Gender 30year old female MRN [id number]   Location [facility]. Attending Navarro Mcclendon MD   Hosp Day # 0 PCP Patrice Miranda MD       Anesthesia Po

## 2019-07-22 NOTE — OPERATIVE REPORT
OPERATIVE REPORT   PATIENT NAME: Angelo Silva  MRN: [id number]  DATE OF OPERATION: 7/22/2019  PREOPERATIVE DIAGNOSIS:   1. Abdominal pain, status post cholecystectomy. Patient had a history of cholelithiasis. No intraoperative cholangiogram was done.   [facility] endoscopic features of eosinophilic esophagitis or Estevez's esophagus. 2.  Post sleeve gastrectomy surgical anatomy without obvious ulceration random biopsies were taken from the stomach. .  3. Normal duodenum to the second portion with no ulcers or zara

## 2019-07-22 NOTE — ANESTHESIA PREPROCEDURE EVALUATION
PRE-OP EVALUATION    Patient Name: Se Sun    Pre-op Diagnosis: Upper abdominal pain [R10.10]  Nausea [R11.0]  Intractable vomiting with nausea, unspecified vomiting type [R11.2]  History of gallstones [Z87.19]  Dilation of biliary tract [K83. 8] GI/Hepatic/Renal  Comment: Dilated bile duct  Polycystic kidney disease    (+) GERD                           Cardiovascular    Negative cardiovascular ROS.         MET: >4  Unable to assess MET.  (+) obesity                                       Endo/Other 06/17/2019    MCH 29.9 07/20/2019    MCH 29.4 06/17/2019    MCHC 32.6 07/20/2019    MCHC 31.6 06/17/2019    RDW 11.4 07/20/2019    RDW 12.5 06/17/2019    .0 07/20/2019     06/17/2019     Lab Results   Component Value Date     07/20/2019

## 2019-07-25 NOTE — PROGRESS NOTES
7/25/2019  [address] [phone]    Dear Poli Connolly,       Here are the biopsy/pathology findings from your recent EGD (Upper  Endoscopy) and endoscopic ultrasound:  1. Small bowel (duodenum) biopsies are unremarkable.  [employer]

## 2019-08-15 ENCOUNTER — TELEPHONE (OUTPATIENT)
Dept: SURGERY | Facility: CLINIC | Age: 29
End: 2019-08-15

## 2019-08-15 NOTE — PROGRESS NOTES
[facility] AND WEIGHT LOSS CLINIC  [address] [place] [address]  Dept: [phone]  Loc: [phone]    Date: 18      Patient:  Bryson Corley  :      6/15/1990  MRN:      [id number]    Referkylah breakfast., Disp: 30 tablet, Rfl: 3  •  Insulin Pen Needle (NOVOFINE) 32G X 6 MM Does not apply Misc, Use a new needle with each use, Disp: 1 Box, Rfl: 3  •  Fexofenadine HCl 180 MG Oral Tab, Take 180 mg by mouth as needed.   , Disp: , Rfl:   •  Levonorgest gastrectomy   • OTHER SURGICAL HISTORY      ganglion cyst removed   • THIGH LIFT Bilateral 12/13/2017    Performed by Matti Aguirre MD at [facility] 1 LIPOSUCTION Bilateral 12/13/2017    Performed by Matti Aguirre MD at [place] [address] to have kids prior to having breasts/tummy gina    Would recommend following up with Dr Re Givens for possible EGD    S/P hip surgery      No orders of the defined types were placed in this encounter.       Robert Molina MD

## 2019-08-21 NOTE — TELEPHONE ENCOUNTER
Called to remind patient to schedule appointments with surgeon and dietician, patient prefers to just follow with Dr. Arti Corado at this point.

## 2019-08-24 ENCOUNTER — LAB ENCOUNTER (OUTPATIENT)
Dept: LAB | Facility: HOSPITAL | Age: 29
End: 2019-08-24
Attending: INTERNAL MEDICINE
Payer: COMMERCIAL

## 2019-08-24 DIAGNOSIS — R10.9 ABDOMINAL PAIN, UNSPECIFIED ABDOMINAL LOCATION: ICD-10-CM

## 2019-08-24 LAB — CORTIS SERPL-MCNC: 9 UG/DL

## 2019-08-24 PROCEDURE — 82533 TOTAL CORTISOL: CPT

## 2019-08-24 PROCEDURE — 36415 COLL VENOUS BLD VENIPUNCTURE: CPT

## 2019-08-26 NOTE — PROGRESS NOTES
AM cortisol level is normal. Symptoms of abdominal pain may likely be functional, irritable bowel syndrome. Please notify the office if symptoms persist and we can discuss management options.

## 2019-11-01 ENCOUNTER — WALK IN (OUTPATIENT)
Dept: URGENT CARE | Age: 29
End: 2019-11-01

## 2019-11-01 DIAGNOSIS — H10.9 BACTERIAL CONJUNCTIVITIS: Primary | ICD-10-CM

## 2019-11-01 PROCEDURE — 99203 OFFICE O/P NEW LOW 30 MIN: CPT | Performed by: NURSE PRACTITIONER

## 2019-11-01 RX ORDER — BUSPIRONE HYDROCHLORIDE 30 MG/1
30 TABLET ORAL 2 TIMES DAILY
COMMUNITY

## 2019-11-01 RX ORDER — TOBRAMYCIN 3 MG/ML
1 SOLUTION/ DROPS OPHTHALMIC EVERY 4 HOURS
Qty: 5 ML | Refills: 0 | Status: SHIPPED | OUTPATIENT
Start: 2019-11-01 | End: 2019-11-08

## 2019-11-01 ASSESSMENT — ENCOUNTER SYMPTOMS
APPETITE CHANGE: 0
EYE PAIN: 0
EYE DISCHARGE: 1
EYE REDNESS: 1
EYE ITCHING: 1
AGITATION: 0
HEADACHES: 1
ACTIVITY CHANGE: 0

## 2019-11-01 ASSESSMENT — PAIN SCALES - GENERAL: PAINLEVEL: 3-4

## 2019-11-25 ENCOUNTER — HOSPITAL ENCOUNTER (EMERGENCY)
Facility: HOSPITAL | Age: 29
Discharge: HOME | End: 2019-11-26
Attending: EMERGENCY MEDICINE
Payer: COMMERCIAL

## 2019-11-25 DIAGNOSIS — R11.2 NAUSEA VOMITING AND DIARRHEA: ICD-10-CM

## 2019-11-25 DIAGNOSIS — E86.0 DEHYDRATION: Primary | ICD-10-CM

## 2019-11-25 DIAGNOSIS — R19.7 NAUSEA VOMITING AND DIARRHEA: ICD-10-CM

## 2019-11-25 PROCEDURE — 81025 URINE PREGNANCY TEST: CPT

## 2019-11-25 PROCEDURE — 81001 URINALYSIS AUTO W/SCOPE: CPT | Performed by: EMERGENCY MEDICINE

## 2019-11-25 PROCEDURE — 99284 EMERGENCY DEPT VISIT MOD MDM: CPT

## 2019-11-25 PROCEDURE — 80053 COMPREHEN METABOLIC PANEL: CPT | Performed by: EMERGENCY MEDICINE

## 2019-11-25 PROCEDURE — 96374 THER/PROPH/DIAG INJ IV PUSH: CPT

## 2019-11-25 PROCEDURE — 96361 HYDRATE IV INFUSION ADD-ON: CPT

## 2019-11-25 PROCEDURE — 85025 COMPLETE CBC W/AUTO DIFF WBC: CPT | Performed by: EMERGENCY MEDICINE

## 2019-11-25 RX ORDER — ONDANSETRON 2 MG/ML
4 INJECTION INTRAMUSCULAR; INTRAVENOUS ONCE
Status: COMPLETED | OUTPATIENT
Start: 2019-11-25 | End: 2019-11-25

## 2019-11-25 RX ORDER — CHOLESTYRAMINE LIGHT 4 G/5.7G
4 POWDER, FOR SUSPENSION ORAL 2 TIMES DAILY
Refills: 0 | COMMUNITY
Start: 2019-11-13 | End: 2020-12-23 | Stop reason: ALTCHOICE

## 2019-11-26 VITALS
HEIGHT: 71 IN | HEART RATE: 76 BPM | WEIGHT: 220 LBS | SYSTOLIC BLOOD PRESSURE: 93 MMHG | DIASTOLIC BLOOD PRESSURE: 63 MMHG | RESPIRATION RATE: 14 BRPM | BODY MASS INDEX: 30.8 KG/M2 | TEMPERATURE: 97 F | OXYGEN SATURATION: 100 %

## 2019-11-26 RX ORDER — DIPHENOXYLATE HCL/ATROPINE 2.5-.025/5
5 LIQUID (ML) ORAL ONCE
Status: COMPLETED | OUTPATIENT
Start: 2019-11-26 | End: 2019-11-26

## 2019-11-26 NOTE — ED INITIAL ASSESSMENT (HPI)
Patient here with diarrhea for the last month and has had 8 episodes of diarrhea tonight. Patient has been treated and referred to specialist at [facility]. Patient is here for dehydration.

## 2019-11-26 NOTE — ED PROVIDER NOTES
Patient Seen in: [facility] Emergency Department      History   Patient presents with:  Nausea/Vomiting/Diarrhea (gastrointestinal)    Stated Complaint: vomiting     HPI    16-year-old female with such difficulty with chronic nausea, vomiting and di Performed by Adan James MD at [facility] 1 LIPOSUCTION Bilateral 12/13/2017    Performed by Adan James MD at [address] [place] [address] History    Tobacco Use      Smoking status: Never Smoker      Smokeless tobacco: Never U All other components within normal limits   CBC W/ DIFFERENTIAL - Abnormal; Notable for the following components:    HGB 11.5 (*)     All other components within normal limits   CBC WITH DIFFERENTIAL WITH PLATELET    Narrative:      The following orders

## 2020-03-06 ENCOUNTER — HOSPITAL ENCOUNTER (OUTPATIENT)
Age: 30
Discharge: HOME | End: 2020-03-06
Attending: EMERGENCY MEDICINE
Payer: COMMERCIAL

## 2020-03-06 VITALS
SYSTOLIC BLOOD PRESSURE: 104 MMHG | RESPIRATION RATE: 18 BRPM | OXYGEN SATURATION: 98 % | TEMPERATURE: 99 F | DIASTOLIC BLOOD PRESSURE: 64 MMHG | HEART RATE: 85 BPM

## 2020-03-06 DIAGNOSIS — J01.10 ACUTE FRONTAL SINUSITIS, RECURRENCE NOT SPECIFIED: Primary | ICD-10-CM

## 2020-03-06 PROCEDURE — 99214 OFFICE O/P EST MOD 30 MIN: CPT

## 2020-03-06 PROCEDURE — 99213 OFFICE O/P EST LOW 20 MIN: CPT

## 2020-03-06 RX ORDER — ONDANSETRON 4 MG/1
4 TABLET, ORALLY DISINTEGRATING ORAL EVERY 8 HOURS PRN
Qty: 10 TABLET | Refills: 0 | Status: SHIPPED | OUTPATIENT
Start: 2020-03-06 | End: 2020-03-13

## 2020-03-06 RX ORDER — AZITHROMYCIN 500 MG/1
500 TABLET, FILM COATED ORAL DAILY
Qty: 5 TABLET | Refills: 0 | Status: SHIPPED | OUTPATIENT
Start: 2020-03-06 | End: 2020-03-11

## 2020-03-06 RX ORDER — ECHINACEA PURPUREA EXTRACT 125 MG
2 TABLET ORAL AS NEEDED
Qty: 60 ML | Refills: 0 | Status: SHIPPED | OUTPATIENT
Start: 2020-03-06 | End: 2020-03-11

## 2020-03-06 RX ORDER — FLUTICASONE PROPIONATE 50 MCG
2 SPRAY, SUSPENSION (ML) NASAL DAILY
Qty: 16 G | Refills: 0 | Status: SHIPPED | OUTPATIENT
Start: 2020-03-06 | End: 2020-04-05

## 2020-03-07 NOTE — ED PROVIDER NOTES
Patient Seen in: [facility] AND CLINICS Immediate Care In [address] [place] [address]    History   Patient presents with:  Sinus Problem    Stated Complaint: sinus congestion/dizzy     HPI      HISTORY OF PRESENT ILLNESS:Patient complains of URI symptoms for 14 days.   Complains bilateral arms and legs post lap sleeve gastrectomy   • OTHER SURGICAL HISTORY      ganglion cyst removed   • THIGH LIFT Bilateral 12/13/2017    Performed by Marion Madrigal MD at [place] [address] LIPOSUCTION Bilateral 12/13/2017    Performed by Rolando Jacome No      Review of Systems    Positive for stated complaint: sinus congestion/dizzy   Other systems are as noted in HPI. Constitutional and vital signs reviewed. All other systems reviewed and negative except as noted above.     PSFH elements reviewed Nasal Suspension  2 sprays by Nasal route daily. Qty: 16 g Refills: 0    ondansetron 4 MG Oral Tablet Dispersible  Take 1 tablet (4 mg total) by mouth every 8 (eight) hours as needed for Nausea.   Qty: 10 tablet Refills: 0    !! - Potential duplicate medic

## 2020-07-23 ENCOUNTER — TELEPHONE (OUTPATIENT)
Dept: SURGERY | Facility: CLINIC | Age: 30
End: 2020-07-23

## 2020-12-23 ENCOUNTER — OFFICE VISIT (OUTPATIENT)
Dept: SURGERY | Facility: CLINIC | Age: 30
End: 2020-12-23
Payer: COMMERCIAL

## 2020-12-23 VITALS
HEIGHT: 71 IN | DIASTOLIC BLOOD PRESSURE: 80 MMHG | BODY MASS INDEX: 37.66 KG/M2 | WEIGHT: 269 LBS | SYSTOLIC BLOOD PRESSURE: 120 MMHG

## 2020-12-23 DIAGNOSIS — R63.5 WEIGHT GAIN: ICD-10-CM

## 2020-12-23 DIAGNOSIS — R73.09 ABNORMAL BLOOD SUGAR: ICD-10-CM

## 2020-12-23 DIAGNOSIS — R63.2 BINGE EATING: ICD-10-CM

## 2020-12-23 DIAGNOSIS — E44.1 PROTEIN-CALORIE MALNUTRITION, MILD (HCC): Primary | ICD-10-CM

## 2020-12-23 DIAGNOSIS — E66.9 OBESITY (BMI 30-39.9): ICD-10-CM

## 2020-12-23 DIAGNOSIS — Z98.890 S/P GASTROPLASTY: ICD-10-CM

## 2020-12-23 DIAGNOSIS — E55.9 VITAMIN D DEFICIENCY: ICD-10-CM

## 2020-12-23 PROCEDURE — 3074F SYST BP LT 130 MM HG: CPT | Performed by: INTERNAL MEDICINE

## 2020-12-23 PROCEDURE — 3008F BODY MASS INDEX DOCD: CPT | Performed by: INTERNAL MEDICINE

## 2020-12-23 PROCEDURE — 3079F DIAST BP 80-89 MM HG: CPT | Performed by: INTERNAL MEDICINE

## 2020-12-23 PROCEDURE — 99214 OFFICE O/P EST MOD 30 MIN: CPT | Performed by: INTERNAL MEDICINE

## 2020-12-23 RX ORDER — PHENTERMINE HYDROCHLORIDE 15 MG/1
15 CAPSULE ORAL EVERY MORNING
Qty: 30 CAPSULE | Refills: 0 | Status: SHIPPED | OUTPATIENT
Start: 2020-12-23 | End: 2021-11-29 | Stop reason: ALTCHOICE

## 2020-12-23 NOTE — PROGRESS NOTES
[facility] AND WEIGHT LOSS CLINIC  [address] [place] [address]  Dept: [address]: [phone]        Patient:  Nathanael Benavides  :      6/15/1990  MRN:      [id number]    Referring Provider: Nate Garcia daily.  , Disp: , Rfl:   •  Fluticasone Propionate (FLONASE) 50 MCG/ACT Nasal Suspension, 2 sprays by Each Nare route daily. , Disp: 1 Inhaler, Rfl: 1    Allergies:  Keflex [Cephalexin], Penicillin G, Aspirin, Lexapro [Escitalopram], and Topamax [Topiramate weight 269 lb (122 kg), not currently breastfeeding. General appearance: alert, appears stated age and cooperative  Head: Normocephalic, without obvious abnormality, atraumatic  Eyes: conjunctivae/corneas clear. PERRL, EOM's intact. Fundi benign.   Back: s 25-Hydroxy          Order Specific Question: Release to patient          Answer: Immediate      Vitamin B12          Order Specific Question: Release to patient          Answer: Immediate      Hemoglobin A1C          Order Specific Question: Release to pat

## 2021-01-06 ENCOUNTER — TELEPHONE (OUTPATIENT)
Dept: SCHEDULING | Age: 31
End: 2021-01-06

## 2021-01-06 DIAGNOSIS — Z84.81 FAMILY HISTORY OF GENETIC DISEASE CARRIER: ICD-10-CM

## 2021-01-06 DIAGNOSIS — Z87.448: ICD-10-CM

## 2021-01-06 DIAGNOSIS — Z31.438 OTHER GENETIC TESTING OF FEMALE: Primary | ICD-10-CM

## 2021-01-06 DIAGNOSIS — Z82.71: ICD-10-CM

## 2021-05-25 VITALS
SYSTOLIC BLOOD PRESSURE: 116 MMHG | HEART RATE: 114 BPM | HEIGHT: 71 IN | TEMPERATURE: 98.1 F | WEIGHT: 223.22 LBS | DIASTOLIC BLOOD PRESSURE: 70 MMHG | OXYGEN SATURATION: 99 % | BODY MASS INDEX: 31.25 KG/M2

## 2021-05-27 ENCOUNTER — OFFICE VISIT (OUTPATIENT)
Dept: SURGERY | Facility: CLINIC | Age: 31
End: 2021-05-27
Payer: COMMERCIAL

## 2021-05-27 VITALS
HEIGHT: 71 IN | BODY MASS INDEX: 39.65 KG/M2 | DIASTOLIC BLOOD PRESSURE: 76 MMHG | WEIGHT: 283.19 LBS | OXYGEN SATURATION: 98 % | HEART RATE: 79 BPM | SYSTOLIC BLOOD PRESSURE: 116 MMHG

## 2021-05-27 DIAGNOSIS — Z98.890 S/P GASTROPLASTY: ICD-10-CM

## 2021-05-27 DIAGNOSIS — E44.1 PROTEIN-CALORIE MALNUTRITION, MILD (HCC): Primary | ICD-10-CM

## 2021-05-27 DIAGNOSIS — E66.9 OBESITY (BMI 30-39.9): ICD-10-CM

## 2021-05-27 DIAGNOSIS — F43.9 STRESS: ICD-10-CM

## 2021-05-27 PROCEDURE — 3078F DIAST BP <80 MM HG: CPT | Performed by: INTERNAL MEDICINE

## 2021-05-27 PROCEDURE — 99214 OFFICE O/P EST MOD 30 MIN: CPT | Performed by: INTERNAL MEDICINE

## 2021-05-27 PROCEDURE — 3008F BODY MASS INDEX DOCD: CPT | Performed by: INTERNAL MEDICINE

## 2021-05-27 PROCEDURE — 3074F SYST BP LT 130 MM HG: CPT | Performed by: INTERNAL MEDICINE

## 2021-05-27 RX ORDER — LEVOTHYROXINE SODIUM 0.03 MG/1
TABLET ORAL
COMMUNITY
Start: 2021-05-10

## 2021-05-27 NOTE — PROGRESS NOTES
[facility] [facility] AND WEIGHT LOSS CLINIC  [address] [place] [address]  Dept: [address]: [phone]        Patient:  Branden John  :      6/15/1990  MRN:      [id number]    Referring Provider: David Suh Intrauterine route., Disp: , Rfl:   •  PREVIDENT 5000 ENAMEL PROTECT 1.1-5 % Dental Paste, Use as directed, Disp: , Rfl: 5  •  Multiple Vitamins-Minerals (MULTIVITAMIN OR), Take by mouth daily.   , Disp: , Rfl:   •  Fluticasone Propionate (FLONASE) 50 MCG/A organomegaly  Extremities: extremities normal, atraumatic, no cyanosis or edema  Pulses: 2+ and symmetric  Skin: redness noted under skin folds of pannus   Scars noted under arms and thighs.      ROS:    Constitutional: positive for fatigue  Respiratory: ne

## 2021-08-25 RX ORDER — PANTOPRAZOLE SODIUM 40 MG/1
40 TABLET, DELAYED RELEASE ORAL 2 TIMES DAILY
Qty: 180 TABLET | Refills: 1 | Status: SHIPPED | OUTPATIENT
Start: 2021-08-25 | End: 2022-01-31

## 2021-09-15 ENCOUNTER — APPOINTMENT (OUTPATIENT)
Dept: GENERAL RADIOLOGY | Age: 31
End: 2021-09-15
Attending: EMERGENCY MEDICINE
Payer: COMMERCIAL

## 2021-09-15 ENCOUNTER — HOSPITAL ENCOUNTER (OUTPATIENT)
Age: 31
Discharge: HOME | End: 2021-09-15
Payer: COMMERCIAL

## 2021-09-15 VITALS
SYSTOLIC BLOOD PRESSURE: 120 MMHG | TEMPERATURE: 98 F | DIASTOLIC BLOOD PRESSURE: 77 MMHG | OXYGEN SATURATION: 99 % | HEART RATE: 93 BPM | RESPIRATION RATE: 18 BRPM

## 2021-09-15 DIAGNOSIS — S69.90XA HAND INJURY: Primary | ICD-10-CM

## 2021-09-15 DIAGNOSIS — S63.633A SPRAIN OF INTERPHALANGEAL JOINT OF LEFT MIDDLE FINGER, INITIAL ENCOUNTER: ICD-10-CM

## 2021-09-15 PROCEDURE — 99213 OFFICE O/P EST LOW 20 MIN: CPT | Performed by: EMERGENCY MEDICINE

## 2021-09-15 PROCEDURE — 73130 X-RAY EXAM OF HAND: CPT | Performed by: EMERGENCY MEDICINE

## 2021-09-17 NOTE — ED PROVIDER NOTES
Patient Seen in: [facility]      History   No chief complaint on file. Stated Complaint: L middle fingers slammed in trunk of SUV.     Subjective:   Megan Brennan is a 28year old female here for left middle finger pain after hand was Mood and Affect: Mood normal.         Behavior: Behavior normal.         Thought Content:  Thought content normal.         Judgment: Judgment normal.               ED Course   Labs Reviewed - No data to display             XR HAND (MIN 3 VIEWS), LEFT

## 2021-11-29 NOTE — PROGRESS NOTES
[facility] AND WEIGHT LOSS CLINIC  [address] [place] [address]  Dept: [address]: [phone]        Patient:  Lori Lucio  :      6/15/1990  MRN:      [id number]    Referring Provider: Chris Dunne •  Levonorgestrel (MIRENA, 52 MG, IU), by Intrauterine route., Disp: , Rfl:   •  PREVIDENT 5000 ENAMEL PROTECT 1.1-5 % Dental Paste, Use as directed, Disp: , Rfl: 5  •  Multiple Vitamins-Minerals (MULTIVITAMIN OR), Take by mouth daily.   , Disp: , Rfl: normal; no masses,  no organomegaly  Extremities: extremities normal, atraumatic, no cyanosis or edema  Pulses: 2+ and symmetric  Skin: redness noted under skin folds of pannus   Scars noted under arms and thighs.      ROS:    Constitutional: positive for f

## 2021-12-20 DIAGNOSIS — E66.01 MORBID OBESITY WITH BMI OF 40.0-44.9, ADULT (HCC): Primary | ICD-10-CM

## 2021-12-20 RX ORDER — SEMAGLUTIDE 0.5 MG/.5ML
0.5 INJECTION, SOLUTION SUBCUTANEOUS WEEKLY
Qty: 2 ML | Refills: 2 | Status: SHIPPED | OUTPATIENT
Start: 2021-12-20 | End: 2022-01-11

## 2021-12-23 ENCOUNTER — TELEPHONE (OUTPATIENT)
Dept: SURGERY | Facility: CLINIC | Age: 31
End: 2021-12-23

## 2021-12-23 NOTE — TELEPHONE ENCOUNTER
P.A initiated for [facility] medication through [facility]. Waiting for response from insurance.

## 2022-01-31 RX ORDER — PANTOPRAZOLE SODIUM 40 MG/1
TABLET, DELAYED RELEASE ORAL
Qty: 180 TABLET | Refills: 3 | Status: SHIPPED | OUTPATIENT
Start: 2022-01-31

## 2022-10-14 ENCOUNTER — LAB ENCOUNTER (OUTPATIENT)
Dept: LAB | Facility: HOSPITAL | Age: 32
End: 2022-10-14
Attending: INTERNAL MEDICINE
Payer: COMMERCIAL

## 2022-10-14 ENCOUNTER — OFFICE VISIT (OUTPATIENT)
Dept: SURGERY | Facility: CLINIC | Age: 32
End: 2022-10-14
Payer: COMMERCIAL

## 2022-10-14 VITALS
SYSTOLIC BLOOD PRESSURE: 111 MMHG | OXYGEN SATURATION: 97 % | BODY MASS INDEX: 40.83 KG/M2 | WEIGHT: 291.63 LBS | HEIGHT: 71 IN | DIASTOLIC BLOOD PRESSURE: 72 MMHG | HEART RATE: 73 BPM

## 2022-10-14 DIAGNOSIS — F43.9 STRESS: ICD-10-CM

## 2022-10-14 DIAGNOSIS — Z98.890 S/P GASTROPLASTY: ICD-10-CM

## 2022-10-14 DIAGNOSIS — E66.01 MORBID OBESITY WITH BMI OF 40.0-44.9, ADULT (HCC): ICD-10-CM

## 2022-10-14 DIAGNOSIS — R63.2 BINGE EATING: ICD-10-CM

## 2022-10-14 DIAGNOSIS — E44.1 PROTEIN-CALORIE MALNUTRITION, MILD (HCC): ICD-10-CM

## 2022-10-14 DIAGNOSIS — E44.1 PROTEIN-CALORIE MALNUTRITION, MILD (HCC): Primary | ICD-10-CM

## 2022-10-14 DIAGNOSIS — E55.9 VITAMIN D DEFICIENCY: ICD-10-CM

## 2022-10-14 LAB
FOLATE SERPL-MCNC: 11.9 NG/ML (ref 8.7–?)
VIT B12 SERPL-MCNC: 701 PG/ML (ref 193–986)
VIT D+METAB SERPL-MCNC: 23.2 NG/ML (ref 30–100)

## 2022-10-14 PROCEDURE — 82397 CHEMILUMINESCENT ASSAY: CPT

## 2022-10-14 PROCEDURE — 82607 VITAMIN B-12: CPT

## 2022-10-14 PROCEDURE — 84425 ASSAY OF VITAMIN B-1: CPT

## 2022-10-14 PROCEDURE — 82746 ASSAY OF FOLIC ACID SERUM: CPT

## 2022-10-14 PROCEDURE — 36415 COLL VENOUS BLD VENIPUNCTURE: CPT

## 2022-10-14 PROCEDURE — 82306 VITAMIN D 25 HYDROXY: CPT

## 2022-10-14 PROCEDURE — 3008F BODY MASS INDEX DOCD: CPT | Performed by: INTERNAL MEDICINE

## 2022-10-14 PROCEDURE — 3078F DIAST BP <80 MM HG: CPT | Performed by: INTERNAL MEDICINE

## 2022-10-14 PROCEDURE — 99214 OFFICE O/P EST MOD 30 MIN: CPT | Performed by: INTERNAL MEDICINE

## 2022-10-14 PROCEDURE — 3074F SYST BP LT 130 MM HG: CPT | Performed by: INTERNAL MEDICINE

## 2022-10-14 RX ORDER — VILAZODONE HYDROCHLORIDE 40 MG/1
TABLET ORAL
COMMUNITY

## 2022-10-14 RX ORDER — SEMAGLUTIDE 1.7 MG/.75ML
1.7 INJECTION, SOLUTION SUBCUTANEOUS WEEKLY
Qty: 4 EACH | Refills: 0 | Status: SHIPPED | OUTPATIENT
Start: 2022-10-14

## 2022-10-14 RX ORDER — BUSPIRONE HYDROCHLORIDE 30 MG/1
30 TABLET ORAL 2 TIMES DAILY
COMMUNITY
Start: 2019-11-06

## 2022-10-14 RX ORDER — ARIPIPRAZOLE 10 MG/1
TABLET ORAL
COMMUNITY

## 2022-10-14 RX ORDER — FEXOFENADINE HCL 180 MG/1
TABLET ORAL
COMMUNITY
Start: 2019-12-16

## 2022-10-22 LAB — VITAMIN B1 (THIAMINE), WHOLE B: 119 NMOL/L

## 2022-10-24 LAB — LEPTIN: 27.5 NG/ML

## 2023-02-01 RX ORDER — PANTOPRAZOLE SODIUM 40 MG/1
TABLET, DELAYED RELEASE ORAL
Qty: 180 TABLET | Refills: 3 | Status: SHIPPED | OUTPATIENT
Start: 2023-02-01

## 2023-04-13 ENCOUNTER — OFFICE VISIT (OUTPATIENT)
Dept: SURGERY | Facility: CLINIC | Age: 33
End: 2023-04-13
Payer: COMMERCIAL

## 2023-04-13 VITALS
HEART RATE: 94 BPM | SYSTOLIC BLOOD PRESSURE: 119 MMHG | DIASTOLIC BLOOD PRESSURE: 76 MMHG | OXYGEN SATURATION: 96 % | BODY MASS INDEX: 40.51 KG/M2 | WEIGHT: 289.38 LBS | HEIGHT: 71 IN

## 2023-04-13 DIAGNOSIS — E66.01 MORBID OBESITY WITH BMI OF 40.0-44.9, ADULT (HCC): ICD-10-CM

## 2023-04-13 DIAGNOSIS — E44.1 PROTEIN-CALORIE MALNUTRITION, MILD (HCC): Primary | ICD-10-CM

## 2023-04-13 DIAGNOSIS — Z98.890 S/P GASTROPLASTY: ICD-10-CM

## 2023-04-13 DIAGNOSIS — R63.2 BINGE EATING: ICD-10-CM

## 2023-04-13 DIAGNOSIS — F43.9 STRESS: ICD-10-CM

## 2023-04-13 PROCEDURE — 99214 OFFICE O/P EST MOD 30 MIN: CPT | Performed by: INTERNAL MEDICINE

## 2023-04-13 PROCEDURE — 3008F BODY MASS INDEX DOCD: CPT | Performed by: INTERNAL MEDICINE

## 2023-04-13 PROCEDURE — 3074F SYST BP LT 130 MM HG: CPT | Performed by: INTERNAL MEDICINE

## 2023-04-13 PROCEDURE — 3078F DIAST BP <80 MM HG: CPT | Performed by: INTERNAL MEDICINE

## 2023-04-13 RX ORDER — SEMAGLUTIDE 1.7 MG/.75ML
1.7 INJECTION, SOLUTION SUBCUTANEOUS WEEKLY
Qty: 9 ML | Refills: 1 | Status: SHIPPED | OUTPATIENT
Start: 2023-04-13 | End: 2023-07-12

## 2023-04-13 RX ORDER — PANTOPRAZOLE SODIUM 40 MG/1
40 TABLET, DELAYED RELEASE ORAL
COMMUNITY
Start: 2019-12-16

## 2023-04-13 RX ORDER — SODIUM FLUORIDE 6 MG/ML
1 PASTE, DENTIFRICE DENTAL AS DIRECTED
COMMUNITY
Start: 2022-12-21

## 2023-04-25 NOTE — TELEPHONE ENCOUNTER
5/15/17 @ 3:58pm Spoke to [facility] at [facility], [phone]. CNY# She verified that patient has following benefits for Bariatric services: [employer] (BJS#[id number]) DX E66.01 Pt has benefits for Dietitian ([id number]) ONLY if diagnosed with diabetes.  No limit
None

## 2023-07-07 ENCOUNTER — HOSPITAL ENCOUNTER (OUTPATIENT)
Age: 33
Discharge: HOME | End: 2023-07-07
Payer: COMMERCIAL

## 2023-07-07 VITALS
SYSTOLIC BLOOD PRESSURE: 121 MMHG | TEMPERATURE: 97 F | RESPIRATION RATE: 20 BRPM | HEART RATE: 94 BPM | DIASTOLIC BLOOD PRESSURE: 72 MMHG | OXYGEN SATURATION: 98 %

## 2023-07-07 DIAGNOSIS — R11.14 BILIOUS VOMITING WITH NAUSEA: Primary | ICD-10-CM

## 2023-07-07 DIAGNOSIS — R11.2 NAUSEA AND VOMITING, UNSPECIFIED VOMITING TYPE: Primary | ICD-10-CM

## 2023-07-07 DIAGNOSIS — N30.90 CYSTITIS: ICD-10-CM

## 2023-07-07 LAB
#MXD IC: 0.2 X10ˆ3/UL (ref 0.1–1)
B-HCG UR QL: NEGATIVE
BILIRUB UR QL STRIP: NEGATIVE
BUN BLD-MCNC: 10 MG/DL (ref 7–18)
CHLORIDE BLD-SCNC: 104 MMOL/L (ref 98–112)
CO2 BLD-SCNC: 26 MMOL/L (ref 21–32)
COLOR UR: YELLOW
CREAT BLD-MCNC: 0.8 MG/DL
GFR SERPLBLD BASED ON 1.73 SQ M-ARVRAT: 100 ML/MIN/1.73M2 (ref 60–?)
GLUCOSE BLD-MCNC: 105 MG/DL (ref 70–99)
GLUCOSE UR STRIP-MCNC: NEGATIVE MG/DL
HCT VFR BLD AUTO: 44.3 %
HCT VFR BLD CALC: 47 %
HGB BLD-MCNC: 14.5 G/DL
HGB UR QL STRIP: NEGATIVE
ISTAT IONIZED CALCIUM FOR CHEM 8: 1.2 MMOL/L (ref 1.12–1.32)
KETONES UR STRIP-MCNC: NEGATIVE MG/DL
LYMPHOCYTES # BLD AUTO: 1.8 X10ˆ3/UL (ref 1–4)
LYMPHOCYTES NFR BLD AUTO: 27.1 %
MCH RBC QN AUTO: 29.8 PG (ref 26–34)
MCHC RBC AUTO-ENTMCNC: 32.7 G/DL (ref 31–37)
MCV RBC AUTO: 91 FL (ref 80–100)
MIXED CELL %: 3.1 %
NEUTROPHILS # BLD AUTO: 4.5 X10ˆ3/UL (ref 1.5–7.7)
NEUTROPHILS NFR BLD AUTO: 69.8 %
NITRITE UR QL STRIP: NEGATIVE
PH UR STRIP: 7.5 [PH]
PLATELET # BLD AUTO: 252 X10ˆ3/UL (ref 150–450)
POTASSIUM BLD-SCNC: 3.8 MMOL/L (ref 3.6–5.1)
PROT UR STRIP-MCNC: NEGATIVE MG/DL
RBC # BLD AUTO: 4.87 X10ˆ6/UL
SODIUM BLD-SCNC: 140 MMOL/L (ref 136–145)
SP GR UR STRIP: 1.02
UROBILINOGEN UR STRIP-ACNC: <2 MG/DL
WBC # BLD AUTO: 6.5 X10ˆ3/UL (ref 4–11)

## 2023-07-07 PROCEDURE — 87086 URINE CULTURE/COLONY COUNT: CPT | Performed by: NURSE PRACTITIONER

## 2023-07-07 RX ORDER — ONDANSETRON 2 MG/ML
4 INJECTION INTRAMUSCULAR; INTRAVENOUS ONCE
Status: COMPLETED | OUTPATIENT
Start: 2023-07-07 | End: 2023-07-07

## 2023-07-07 RX ORDER — ONDANSETRON HYDROCHLORIDE 8 MG/1
8 TABLET, FILM COATED ORAL EVERY 8 HOURS PRN
Qty: 30 TABLET | Refills: 0 | Status: SHIPPED | OUTPATIENT
Start: 2023-07-07

## 2023-07-07 RX ORDER — NITROFURANTOIN 25; 75 MG/1; MG/1
100 CAPSULE ORAL 2 TIMES DAILY
Qty: 14 CAPSULE | Refills: 0 | Status: SHIPPED | OUTPATIENT
Start: 2023-07-07 | End: 2023-07-14

## 2023-07-07 RX ORDER — SODIUM CHLORIDE 9 MG/ML
1000 INJECTION, SOLUTION INTRAVENOUS ONCE
Status: COMPLETED | OUTPATIENT
Start: 2023-07-07 | End: 2023-07-07

## 2023-07-07 NOTE — ED INITIAL ASSESSMENT (HPI)
Pt started wegovy again and yesterday began having vomiting, pt states vomited 12 times yesterday. Pt states having PKD and is concerned for dehydration.

## 2023-07-07 NOTE — DISCHARGE INSTRUCTIONS
Please  your Zofran from the pharmacy that was called in by your bariatric doctor. Close follow-up with the primary care provider or bariatric doctor is recommended.   Any worsening pain, unable to tolerate p.o. please immediately go to the emergency department

## 2023-07-19 ENCOUNTER — APPOINTMENT (OUTPATIENT)
Dept: GENERAL RADIOLOGY | Facility: HOSPITAL | Age: 33
End: 2023-07-19
Attending: EMERGENCY MEDICINE
Payer: COMMERCIAL

## 2023-07-19 ENCOUNTER — HOSPITAL ENCOUNTER (EMERGENCY)
Facility: HOSPITAL | Age: 33
Discharge: HOME | End: 2023-07-20
Attending: EMERGENCY MEDICINE
Payer: COMMERCIAL

## 2023-07-19 DIAGNOSIS — M54.50 ACUTE RIGHT-SIDED LOW BACK PAIN WITHOUT SCIATICA: Primary | ICD-10-CM

## 2023-07-19 PROCEDURE — 80053 COMPREHEN METABOLIC PANEL: CPT | Performed by: EMERGENCY MEDICINE

## 2023-07-19 PROCEDURE — 71101 X-RAY EXAM UNILAT RIBS/CHEST: CPT | Performed by: EMERGENCY MEDICINE

## 2023-07-19 PROCEDURE — 99284 EMERGENCY DEPT VISIT MOD MDM: CPT

## 2023-07-19 PROCEDURE — 81001 URINALYSIS AUTO W/SCOPE: CPT | Performed by: EMERGENCY MEDICINE

## 2023-07-19 PROCEDURE — 99285 EMERGENCY DEPT VISIT HI MDM: CPT

## 2023-07-19 PROCEDURE — 87086 URINE CULTURE/COLONY COUNT: CPT | Performed by: EMERGENCY MEDICINE

## 2023-07-19 PROCEDURE — 85025 COMPLETE CBC W/AUTO DIFF WBC: CPT | Performed by: EMERGENCY MEDICINE

## 2023-07-19 RX ORDER — ONDANSETRON 2 MG/ML
4 INJECTION INTRAMUSCULAR; INTRAVENOUS ONCE
Status: COMPLETED | OUTPATIENT
Start: 2023-07-19 | End: 2023-07-20

## 2023-07-19 RX ORDER — METHOCARBAMOL 100 MG/ML
1 INJECTION, SOLUTION INTRAMUSCULAR; INTRAVENOUS ONCE
Status: COMPLETED | OUTPATIENT
Start: 2023-07-19 | End: 2023-07-20

## 2023-07-20 ENCOUNTER — APPOINTMENT (OUTPATIENT)
Dept: CT IMAGING | Facility: HOSPITAL | Age: 33
End: 2023-07-20
Attending: EMERGENCY MEDICINE
Payer: COMMERCIAL

## 2023-07-20 VITALS
TEMPERATURE: 99 F | OXYGEN SATURATION: 99 % | HEIGHT: 71 IN | HEART RATE: 82 BPM | SYSTOLIC BLOOD PRESSURE: 117 MMHG | RESPIRATION RATE: 18 BRPM | WEIGHT: 289 LBS | DIASTOLIC BLOOD PRESSURE: 79 MMHG | BODY MASS INDEX: 40.46 KG/M2

## 2023-07-20 LAB
ALBUMIN SERPL-MCNC: 3.7 G/DL (ref 3.4–5)
ALBUMIN/GLOB SERPL: 1.2 {RATIO} (ref 1–2)
ALP LIVER SERPL-CCNC: 51 U/L
ALT SERPL-CCNC: 28 U/L
ANION GAP SERPL CALC-SCNC: 5 MMOL/L (ref 0–18)
AST SERPL-CCNC: 20 U/L (ref 15–37)
B-HCG UR QL: NEGATIVE
BASOPHILS # BLD AUTO: 0.03 X10(3) UL (ref 0–0.2)
BASOPHILS NFR BLD AUTO: 0.4 %
BILIRUB SERPL-MCNC: 0.6 MG/DL (ref 0.1–2)
BILIRUB UR QL: NEGATIVE
BUN BLD-MCNC: 19 MG/DL (ref 7–18)
BUN/CREAT SERPL: 17.4 (ref 10–20)
CALCIUM BLD-MCNC: 8.7 MG/DL (ref 8.5–10.1)
CHLORIDE SERPL-SCNC: 111 MMOL/L (ref 98–112)
CLARITY UR: CLEAR
CO2 SERPL-SCNC: 27 MMOL/L (ref 21–32)
CREAT BLD-MCNC: 1.09 MG/DL
DEPRECATED RDW RBC AUTO: 40.9 FL (ref 35.1–46.3)
EOSINOPHIL # BLD AUTO: 0.1 X10(3) UL (ref 0–0.7)
EOSINOPHIL NFR BLD AUTO: 1.5 %
ERYTHROCYTE [DISTWIDTH] IN BLOOD BY AUTOMATED COUNT: 12.3 % (ref 11–15)
GFR SERPLBLD BASED ON 1.73 SQ M-ARVRAT: 69 ML/MIN/1.73M2 (ref 60–?)
GLOBULIN PLAS-MCNC: 3.1 G/DL (ref 2.8–4.4)
GLUCOSE BLD-MCNC: 83 MG/DL (ref 70–99)
GLUCOSE UR-MCNC: NORMAL MG/DL
HCT VFR BLD AUTO: 41.7 %
HGB BLD-MCNC: 13.7 G/DL
IMM GRANULOCYTES # BLD AUTO: 0.02 X10(3) UL (ref 0–1)
IMM GRANULOCYTES NFR BLD: 0.3 %
KETONES UR-MCNC: NEGATIVE MG/DL
LEUKOCYTE ESTERASE UR QL STRIP.AUTO: 75
LYMPHOCYTES # BLD AUTO: 2.47 X10(3) UL (ref 1–4)
LYMPHOCYTES NFR BLD AUTO: 35.9 %
MCH RBC QN AUTO: 30 PG (ref 26–34)
MCHC RBC AUTO-ENTMCNC: 32.9 G/DL (ref 31–37)
MCV RBC AUTO: 91.2 FL
MONOCYTES # BLD AUTO: 0.48 X10(3) UL (ref 0.1–1)
MONOCYTES NFR BLD AUTO: 7 %
NEUTROPHILS # BLD AUTO: 3.78 X10 (3) UL (ref 1.5–7.7)
NEUTROPHILS # BLD AUTO: 3.78 X10(3) UL (ref 1.5–7.7)
NEUTROPHILS NFR BLD AUTO: 54.9 %
NITRITE UR QL STRIP.AUTO: NEGATIVE
OSMOLALITY SERPL CALC.SUM OF ELEC: 297 MOSM/KG (ref 275–295)
PH UR: 6 [PH] (ref 5–8)
PLATELET # BLD AUTO: 227 10(3)UL (ref 150–450)
POTASSIUM SERPL-SCNC: 3.7 MMOL/L (ref 3.5–5.1)
PROT SERPL-MCNC: 6.8 G/DL (ref 6.4–8.2)
PROT UR-MCNC: 30 MG/DL
RBC # BLD AUTO: 4.57 X10(6)UL
SODIUM SERPL-SCNC: 143 MMOL/L (ref 136–145)
SP GR UR STRIP: >1.03 (ref 1–1.03)
UROBILINOGEN UR STRIP-ACNC: NORMAL
WBC # BLD AUTO: 6.9 X10(3) UL (ref 4–11)

## 2023-07-20 PROCEDURE — 96375 TX/PRO/DX INJ NEW DRUG ADDON: CPT

## 2023-07-20 PROCEDURE — 96374 THER/PROPH/DIAG INJ IV PUSH: CPT

## 2023-07-20 PROCEDURE — 74176 CT ABD & PELVIS W/O CONTRAST: CPT | Performed by: EMERGENCY MEDICINE

## 2023-07-20 PROCEDURE — 96361 HYDRATE IV INFUSION ADD-ON: CPT

## 2023-07-20 PROCEDURE — 81025 URINE PREGNANCY TEST: CPT

## 2023-07-20 RX ORDER — MORPHINE SULFATE 2 MG/ML
2 INJECTION, SOLUTION INTRAMUSCULAR; INTRAVENOUS ONCE
Status: COMPLETED | OUTPATIENT
Start: 2023-07-20 | End: 2023-07-20

## 2023-07-20 RX ORDER — METHOCARBAMOL 500 MG/1
500 TABLET, FILM COATED ORAL 4 TIMES DAILY PRN
Qty: 20 TABLET | Refills: 0 | Status: SHIPPED | OUTPATIENT
Start: 2023-07-20 | End: 2023-07-25

## 2023-07-20 RX ORDER — LIDOCAINE 50 MG/G
1 PATCH TOPICAL EVERY 24 HOURS
Qty: 7 PATCH | Refills: 0 | Status: SHIPPED | OUTPATIENT
Start: 2023-07-20 | End: 2023-07-27

## 2023-07-20 RX ORDER — HYDROCODONE BITARTRATE AND ACETAMINOPHEN 5; 325 MG/1; MG/1
1 TABLET ORAL EVERY 8 HOURS PRN
Qty: 9 TABLET | Refills: 0 | Status: SHIPPED | OUTPATIENT
Start: 2023-07-20 | End: 2023-07-23

## 2023-07-20 NOTE — ED QUICK NOTES
Pt provided discharge paperwork and vital signs assessed prior to discharge. Pt verbalized understanding of all discharge paperwork with no further questions at this time. Vital signs assessed prior to DC (See VS flowsheet for details), IV removed. Pt ambulatory to ED WR.

## 2023-07-20 NOTE — ED INITIAL ASSESSMENT (HPI)
Patient arrives ambulatory through triage with complaints of feeling a \"pop\" on the right flank pain. Patient reports she's concerned a cyst popped. Hx polycystic kidney disease. Recent immediate care visit for [place] medication reaction, persistent diarrhea.

## 2023-08-24 ENCOUNTER — OFFICE VISIT (OUTPATIENT)
Dept: SURGERY | Facility: CLINIC | Age: 33
End: 2023-08-24
Payer: COMMERCIAL

## 2023-08-24 VITALS
HEART RATE: 84 BPM | HEIGHT: 71 IN | OXYGEN SATURATION: 97 % | WEIGHT: 293 LBS | SYSTOLIC BLOOD PRESSURE: 126 MMHG | BODY MASS INDEX: 41.02 KG/M2 | DIASTOLIC BLOOD PRESSURE: 84 MMHG

## 2023-08-24 DIAGNOSIS — F43.9 STRESS: ICD-10-CM

## 2023-08-24 DIAGNOSIS — E44.1 PROTEIN-CALORIE MALNUTRITION, MILD (HCC): Primary | ICD-10-CM

## 2023-08-24 DIAGNOSIS — E66.01 MORBID OBESITY WITH BMI OF 40.0-44.9, ADULT (HCC): ICD-10-CM

## 2023-08-24 DIAGNOSIS — R19.7 DIARRHEA, UNSPECIFIED TYPE: ICD-10-CM

## 2023-08-24 DIAGNOSIS — R63.2 BINGE EATING: ICD-10-CM

## 2023-08-24 DIAGNOSIS — Z98.890 S/P GASTROPLASTY: ICD-10-CM

## 2023-08-24 PROCEDURE — 99214 OFFICE O/P EST MOD 30 MIN: CPT | Performed by: INTERNAL MEDICINE

## 2023-08-24 PROCEDURE — 3074F SYST BP LT 130 MM HG: CPT | Performed by: INTERNAL MEDICINE

## 2023-08-24 PROCEDURE — 3079F DIAST BP 80-89 MM HG: CPT | Performed by: INTERNAL MEDICINE

## 2023-08-24 PROCEDURE — 3008F BODY MASS INDEX DOCD: CPT | Performed by: INTERNAL MEDICINE

## 2023-08-24 NOTE — PATIENT INSTRUCTIONS
Diarrhea since July. Did not tolerate Wegovy    Possible pancreatic insufficiency. Will start German Pep (pancrelipase) with meals. This is a common side effect seen with GLP    Will order bariatric labs.

## 2023-09-08 DIAGNOSIS — R19.7 DIARRHEA, UNSPECIFIED TYPE: Primary | ICD-10-CM

## 2023-09-08 RX ORDER — PANCRELIPASE LIPASE, PANCRELIPASE PROTEASE, PANCRELIPASE AMYLASE 40000; 126000; 168000 [USP'U]/1; [USP'U]/1; [USP'U]/1
1 CAPSULE, DELAYED RELEASE ORAL
Qty: 120 CAPSULE | Refills: 1 | Status: SHIPPED | OUTPATIENT
Start: 2023-09-08 | End: 2023-10-08

## 2023-09-08 RX ORDER — PANCRELIPASE LIPASE, PANCRELIPASE PROTEASE, PANCRELIPASE AMYLASE 40000; 126000; 168000 [USP'U]/1; [USP'U]/1; [USP'U]/1
1 CAPSULE, DELAYED RELEASE ORAL
Qty: 120 CAPSULE | Refills: 1 | Status: SHIPPED | OUTPATIENT
Start: 2023-09-08 | End: 2023-09-08

## 2023-10-24 ENCOUNTER — VIRTUAL PHONE E/M (OUTPATIENT)
Dept: SURGERY | Facility: CLINIC | Age: 33
End: 2023-10-24

## 2023-10-24 VITALS — BODY MASS INDEX: 41.02 KG/M2 | WEIGHT: 293 LBS | HEIGHT: 71 IN

## 2023-10-24 DIAGNOSIS — Z98.890 S/P GASTROPLASTY: ICD-10-CM

## 2023-10-24 DIAGNOSIS — E44.1 PROTEIN-CALORIE MALNUTRITION, MILD (HCC): Primary | ICD-10-CM

## 2023-10-24 DIAGNOSIS — R14.0 BLOATING: ICD-10-CM

## 2023-10-24 DIAGNOSIS — E66.01 MORBID OBESITY WITH BMI OF 40.0-44.9, ADULT (HCC): ICD-10-CM

## 2023-10-24 DIAGNOSIS — R63.2 BINGE EATING: ICD-10-CM

## 2023-10-24 DIAGNOSIS — F43.9 STRESS: ICD-10-CM

## 2023-10-24 PROCEDURE — 99214 OFFICE O/P EST MOD 30 MIN: CPT | Performed by: INTERNAL MEDICINE

## 2023-10-24 PROCEDURE — 3008F BODY MASS INDEX DOCD: CPT | Performed by: INTERNAL MEDICINE

## 2023-10-24 RX ORDER — PANCRELIPASE LIPASE, PANCRELIPASE PROTEASE, PANCRELIPASE AMYLASE 40000; 126000; 168000 [USP'U]/1; [USP'U]/1; [USP'U]/1
1 CAPSULE, DELAYED RELEASE ORAL
Qty: 90 CAPSULE | Refills: 1 | Status: SHIPPED | OUTPATIENT
Start: 2023-10-24 | End: 2023-11-23

## 2023-10-24 RX ORDER — OLANZAPINE 5 MG/1
5 TABLET ORAL NIGHTLY
COMMUNITY
Start: 2023-10-20

## 2023-12-07 ENCOUNTER — TELEPHONE (OUTPATIENT)
Dept: SURGERY | Facility: CLINIC | Age: 33
End: 2023-12-07

## 2023-12-07 NOTE — TELEPHONE ENCOUNTER
Fabi has gained more wt since her last tele visit. She is scheduled for 12/19 but requested it to be changed to a Tele visit. She wanted to inform you that her anti-depressant/anxiety meds have changed and this has caused her wt gain.

## 2023-12-19 ENCOUNTER — VIRTUAL PHONE E/M (OUTPATIENT)
Dept: SURGERY | Facility: CLINIC | Age: 33
End: 2023-12-19
Payer: COMMERCIAL

## 2023-12-19 VITALS — HEIGHT: 71 IN | WEIGHT: 293 LBS | BODY MASS INDEX: 41.02 KG/M2

## 2023-12-19 DIAGNOSIS — R63.2 BINGE EATING: ICD-10-CM

## 2023-12-19 DIAGNOSIS — E44.1 PROTEIN-CALORIE MALNUTRITION, MILD (HCC): Primary | ICD-10-CM

## 2023-12-19 DIAGNOSIS — Z98.890 S/P GASTROPLASTY: ICD-10-CM

## 2023-12-19 DIAGNOSIS — F43.9 STRESS: ICD-10-CM

## 2023-12-19 DIAGNOSIS — E66.01 MORBID OBESITY WITH BMI OF 40.0-44.9, ADULT (HCC): ICD-10-CM

## 2023-12-19 PROCEDURE — 99214 OFFICE O/P EST MOD 30 MIN: CPT | Performed by: INTERNAL MEDICINE

## 2023-12-19 RX ORDER — VILAZODONE HYDROCHLORIDE 40 MG/1
60 TABLET ORAL
COMMUNITY
Start: 2019-12-16

## 2023-12-19 RX ORDER — PHENTERMINE HYDROCHLORIDE 15 MG/1
15 CAPSULE ORAL EVERY MORNING
Qty: 30 CAPSULE | Refills: 2 | Status: SHIPPED | OUTPATIENT
Start: 2023-12-19

## 2023-12-19 RX ORDER — LIRAGLUTIDE 6 MG/ML
3 INJECTION, SOLUTION SUBCUTANEOUS DAILY
Qty: 15 EACH | Refills: 2 | Status: SHIPPED | OUTPATIENT
Start: 2023-12-19

## 2024-01-03 ENCOUNTER — APPOINTMENT (OUTPATIENT)
Dept: CT IMAGING | Facility: HOSPITAL | Age: 34
End: 2024-01-03
Attending: NURSE PRACTITIONER
Payer: COMMERCIAL

## 2024-01-03 ENCOUNTER — HOSPITAL ENCOUNTER (OUTPATIENT)
Age: 34
Discharge: ANOTHER HOSPITAL | End: 2024-01-03
Payer: COMMERCIAL

## 2024-01-03 ENCOUNTER — HOSPITAL ENCOUNTER (EMERGENCY)
Facility: HOSPITAL | Age: 34
Discharge: HOME | End: 2024-01-03
Payer: COMMERCIAL

## 2024-01-03 VITALS
RESPIRATION RATE: 17 BRPM | DIASTOLIC BLOOD PRESSURE: 53 MMHG | BODY MASS INDEX: 41.02 KG/M2 | HEIGHT: 71 IN | WEIGHT: 293 LBS | HEART RATE: 104 BPM | OXYGEN SATURATION: 95 % | TEMPERATURE: 100 F | SYSTOLIC BLOOD PRESSURE: 114 MMHG

## 2024-01-03 VITALS
DIASTOLIC BLOOD PRESSURE: 66 MMHG | RESPIRATION RATE: 20 BRPM | HEART RATE: 120 BPM | TEMPERATURE: 98 F | SYSTOLIC BLOOD PRESSURE: 106 MMHG | OXYGEN SATURATION: 99 %

## 2024-01-03 DIAGNOSIS — Z98.84 HISTORY OF REMOVAL OF LAPAROSCOPIC GASTRIC BANDING DEVICE: ICD-10-CM

## 2024-01-03 DIAGNOSIS — Z90.49 HX OF CHOLECYSTECTOMY: ICD-10-CM

## 2024-01-03 DIAGNOSIS — K52.9 ACUTE GASTROENTERITIS: Primary | ICD-10-CM

## 2024-01-03 DIAGNOSIS — R19.7 DIARRHEA: ICD-10-CM

## 2024-01-03 DIAGNOSIS — R10.84 ABDOMINAL PAIN, GENERALIZED: Primary | ICD-10-CM

## 2024-01-03 DIAGNOSIS — R11.2 NAUSEA AND VOMITING, UNSPECIFIED VOMITING TYPE: ICD-10-CM

## 2024-01-03 LAB
ALBUMIN SERPL-MCNC: 4.9 G/DL (ref 3.2–4.8)
ALBUMIN/GLOB SERPL: 1.6 {RATIO} (ref 1–2)
ALP LIVER SERPL-CCNC: 59 U/L
ALT SERPL-CCNC: 13 U/L
ANION GAP SERPL CALC-SCNC: 9 MMOL/L (ref 0–18)
AST SERPL-CCNC: 18 U/L (ref ?–34)
B-HCG UR QL: NEGATIVE
BASOPHILS # BLD AUTO: 0.01 X10(3) UL (ref 0–0.2)
BASOPHILS NFR BLD AUTO: 0.1 %
BILIRUB SERPL-MCNC: 1.4 MG/DL (ref 0.3–1.2)
BILIRUB UR QL: NEGATIVE
BUN BLD-MCNC: 19 MG/DL (ref 9–23)
BUN/CREAT SERPL: 19 (ref 10–20)
C DIFF TOX B STL QL: NEGATIVE
CALCIUM BLD-MCNC: 9.6 MG/DL (ref 8.7–10.4)
CHLORIDE SERPL-SCNC: 104 MMOL/L (ref 98–112)
CO2 SERPL-SCNC: 25 MMOL/L (ref 21–32)
COLOR UR: YELLOW
CREAT BLD-MCNC: 1 MG/DL
DEPRECATED RDW RBC AUTO: 39.8 FL (ref 35.1–46.3)
EGFRCR SERPLBLD CKD-EPI 2021: 77 ML/MIN/1.73M2 (ref 60–?)
EOSINOPHIL # BLD AUTO: 0.03 X10(3) UL (ref 0–0.7)
EOSINOPHIL NFR BLD AUTO: 0.3 %
ERYTHROCYTE [DISTWIDTH] IN BLOOD BY AUTOMATED COUNT: 12.3 % (ref 11–15)
FLUAV + FLUBV RNA SPEC NAA+PROBE: NEGATIVE
FLUAV + FLUBV RNA SPEC NAA+PROBE: NEGATIVE
GLOBULIN PLAS-MCNC: 3 G/DL (ref 2.8–4.4)
GLUCOSE BLD-MCNC: 102 MG/DL (ref 70–99)
GLUCOSE UR-MCNC: NORMAL MG/DL
HCT VFR BLD AUTO: 41.5 %
HGB BLD-MCNC: 14.3 G/DL
IMM GRANULOCYTES # BLD AUTO: 0.02 X10(3) UL (ref 0–1)
IMM GRANULOCYTES NFR BLD: 0.2 %
KETONES UR-MCNC: NEGATIVE MG/DL
LEUKOCYTE ESTERASE UR QL STRIP.AUTO: 500
LIPASE SERPL-CCNC: 43 U/L (ref 13–75)
LYMPHOCYTES # BLD AUTO: 0.55 X10(3) UL (ref 1–4)
LYMPHOCYTES NFR BLD AUTO: 5.7 %
MCH RBC QN AUTO: 30.6 PG (ref 26–34)
MCHC RBC AUTO-ENTMCNC: 34.5 G/DL (ref 31–37)
MCV RBC AUTO: 88.7 FL
MONOCYTES # BLD AUTO: 0.34 X10(3) UL (ref 0.1–1)
MONOCYTES NFR BLD AUTO: 3.5 %
NEUTROPHILS # BLD AUTO: 8.74 X10 (3) UL (ref 1.5–7.7)
NEUTROPHILS # BLD AUTO: 8.74 X10(3) UL (ref 1.5–7.7)
NEUTROPHILS NFR BLD AUTO: 90.2 %
NITRITE UR QL STRIP.AUTO: NEGATIVE
OSMOLALITY SERPL CALC.SUM OF ELEC: 288 MOSM/KG (ref 275–295)
PH UR: 5 [PH] (ref 5–8)
PLATELET # BLD AUTO: 253 10(3)UL (ref 150–450)
POCT INFLUENZA A: NEGATIVE
POCT INFLUENZA B: NEGATIVE
POTASSIUM SERPL-SCNC: 3.7 MMOL/L (ref 3.5–5.1)
PROT SERPL-MCNC: 7.9 G/DL (ref 5.7–8.2)
PROT UR-MCNC: 20 MG/DL
RBC # BLD AUTO: 4.68 X10(6)UL
RSV RNA SPEC NAA+PROBE: NEGATIVE
SARS-COV-2 RNA RESP QL NAA+PROBE: NOT DETECTED
SODIUM SERPL-SCNC: 138 MMOL/L (ref 136–145)
SP GR UR STRIP: 1.03 (ref 1–1.03)
UROBILINOGEN UR STRIP-ACNC: NORMAL
WBC # BLD AUTO: 9.7 X10(3) UL (ref 4–11)

## 2024-01-03 PROCEDURE — 87046 STOOL CULTR AEROBIC BACT EA: CPT | Performed by: NURSE PRACTITIONER

## 2024-01-03 PROCEDURE — 83690 ASSAY OF LIPASE: CPT | Performed by: NURSE PRACTITIONER

## 2024-01-03 PROCEDURE — 96374 THER/PROPH/DIAG INJ IV PUSH: CPT

## 2024-01-03 PROCEDURE — 87493 C DIFF AMPLIFIED PROBE: CPT | Performed by: NURSE PRACTITIONER

## 2024-01-03 PROCEDURE — 81025 URINE PREGNANCY TEST: CPT

## 2024-01-03 PROCEDURE — 81001 URINALYSIS AUTO W/SCOPE: CPT

## 2024-01-03 PROCEDURE — 96372 THER/PROPH/DIAG INJ SC/IM: CPT

## 2024-01-03 PROCEDURE — 74176 CT ABD & PELVIS W/O CONTRAST: CPT | Performed by: NURSE PRACTITIONER

## 2024-01-03 PROCEDURE — 99284 EMERGENCY DEPT VISIT MOD MDM: CPT | Performed by: NURSE PRACTITIONER

## 2024-01-03 PROCEDURE — 99285 EMERGENCY DEPT VISIT HI MDM: CPT

## 2024-01-03 PROCEDURE — 99284 EMERGENCY DEPT VISIT MOD MDM: CPT

## 2024-01-03 PROCEDURE — 0241U SARS-COV-2/FLU A AND B/RSV BY PCR (GENEXPERT): CPT

## 2024-01-03 PROCEDURE — 80053 COMPREHEN METABOLIC PANEL: CPT

## 2024-01-03 PROCEDURE — 99215 OFFICE O/P EST HI 40 MIN: CPT | Performed by: EMERGENCY MEDICINE

## 2024-01-03 PROCEDURE — 87502 INFLUENZA DNA AMP PROBE: CPT | Performed by: EMERGENCY MEDICINE

## 2024-01-03 PROCEDURE — 85025 COMPLETE CBC W/AUTO DIFF WBC: CPT

## 2024-01-03 PROCEDURE — 96375 TX/PRO/DX INJ NEW DRUG ADDON: CPT

## 2024-01-03 PROCEDURE — 96361 HYDRATE IV INFUSION ADD-ON: CPT

## 2024-01-03 PROCEDURE — 87045 FECES CULTURE AEROBIC BACT: CPT | Performed by: NURSE PRACTITIONER

## 2024-01-03 PROCEDURE — 87427 SHIGA-LIKE TOXIN AG IA: CPT | Performed by: NURSE PRACTITIONER

## 2024-01-03 RX ORDER — ONDANSETRON 4 MG/1
4 TABLET, ORALLY DISINTEGRATING ORAL EVERY 4 HOURS PRN
Qty: 10 TABLET | Refills: 0 | Status: SHIPPED | OUTPATIENT
Start: 2024-01-03 | End: 2024-01-10

## 2024-01-03 RX ORDER — DICYCLOMINE HCL 20 MG
20 TABLET ORAL 4 TIMES DAILY PRN
Qty: 30 TABLET | Refills: 0 | Status: SHIPPED | OUTPATIENT
Start: 2024-01-03 | End: 2024-02-02

## 2024-01-03 RX ORDER — ACETAMINOPHEN 500 MG
1000 TABLET ORAL ONCE
Status: COMPLETED | OUTPATIENT
Start: 2024-01-03 | End: 2024-01-03

## 2024-01-03 RX ORDER — KETOROLAC TROMETHAMINE 15 MG/ML
15 INJECTION, SOLUTION INTRAMUSCULAR; INTRAVENOUS ONCE
Status: COMPLETED | OUTPATIENT
Start: 2024-01-03 | End: 2024-01-03

## 2024-01-03 RX ORDER — LOSARTAN POTASSIUM 25 MG/1
12.5 TABLET ORAL DAILY
COMMUNITY

## 2024-01-03 RX ORDER — ONDANSETRON 2 MG/ML
4 INJECTION INTRAMUSCULAR; INTRAVENOUS ONCE
Status: COMPLETED | OUTPATIENT
Start: 2024-01-03 | End: 2024-01-03

## 2024-01-03 RX ORDER — DICYCLOMINE HYDROCHLORIDE 10 MG/ML
20 INJECTION INTRAMUSCULAR ONCE
Status: COMPLETED | OUTPATIENT
Start: 2024-01-03 | End: 2024-01-03

## 2024-01-03 NOTE — ED INITIAL ASSESSMENT (HPI)
Pt here with sudden onset of n/v/d, chills, body aches, and low grade fever last night. Pt unable to keep anything down. Pt having muscle spasms in bilateral feet. Concerned for dehydration.

## 2024-01-03 NOTE — ED PROVIDER NOTES
Patient Seen in: [facility] Emergency Department      History     Chief Complaint   Patient presents with    Abdomen/Flank Pain     Stated Complaint: Abdominal pain    Subjective:   33-year-old female with history of PCOS presenting with acute onset of nausea, vomiting, diarrhea that began last night.  She reports last night around 7:30 PM she been experiencing generalized abdominal cramping and has since had 3 episodes of watery diarrhea and 5 episodes of NBNB emesis.  Symptoms also associated with a fever to 101 °F, chills, body aches, and general malaise.  She was initially evaluated at immediate care and then referred to the ER for further evaluation.  She denies any new foods, illness exposures, or history of GI disease.            Objective:   Past Medical History:   Diagnosis Date    Anxiety     Back problem     Calculus of kidney     small stones, not causing pain    Depression     stress related     Esophageal reflux     Osteoarthritis     lower back pain    PKD (polycystic kidney disease)     is seeing Dr Chow at [place] for this    PONV (postoperative nausea and vomiting)     severe nausea and vomiting; experienced shakes/\"seizure like reactions    Renal disorder     cysts on both kidneys    S/P gastroplasty 07/25/2016    for weight loss              Past Surgical History:   Procedure Laterality Date    CHOLECYSTECTOMY  04/10/17    DENTAL SURGERY PROCEDURE      FOREARM/WRIST SURGERY UNLISTED Left     cyst removal    LAP SLEEVE GASTRECTOMY  07/25/2016    Dr Ruano    NAIL REMOVAL      OTHER Right 02/2018    right hip repair    OTHER  12/2017    skin removal bilateral arms and legs post lap sleeve gastrectomy    OTHER SURGICAL HISTORY      ganglion cyst removed                Social History     Socioeconomic History    Marital status:    Tobacco Use    Smoking status: Never    Smokeless tobacco: Never   Vaping Use    Vaping Use: Never used   Substance and Sexual Activity    Alcohol use: Yes      Alcohol/week: 0.0 standard drinks of alcohol     Comment: very little amount and rarely    Drug use: No    Sexual activity: Yes     Partners: Male   Other Topics Concern    Pt has a pacemaker No    Pt has a defibrillator No    Reaction to local anesthetic No              Review of Systems   All other systems reviewed and are negative.      Positive for stated complaint: Abdominal pain  Other systems are as noted in HPI.  Constitutional and vital signs reviewed.      All other systems reviewed and negative except as noted above.    Physical Exam     ED Triage Vitals   BP 01/03/24 1120 103/70   Pulse 01/03/24 1120 (!) 122   Resp 01/03/24 1120 20   Temp 01/03/24 1120 98.6 °F (37 °C)   Temp src 01/03/24 1618 Oral   SpO2 01/03/24 1120 97 %   O2 Device 01/03/24 1120 None (Room air)       Current:/53   Pulse 104   Temp 99.6 °F (37.6 °C) (Oral)   Resp 17   Ht 180.3 cm (5' 11\")   Wt (!) 144.2 kg   SpO2 95%   BMI 44.35 kg/m²         Physical Exam  Vitals and nursing note reviewed.   Constitutional:       Appearance: Normal appearance.   HENT:      Head: Normocephalic.      Right Ear: External ear normal.      Left Ear: External ear normal.      Nose: Nose normal.      Mouth/Throat:      Mouth: Mucous membranes are moist.   Eyes:      Extraocular Movements: Extraocular movements intact.      Conjunctiva/sclera: Conjunctivae normal.      Pupils: Pupils are equal, round, and reactive to light.   Cardiovascular:      Rate and Rhythm: Tachycardia present.      Heart sounds: No murmur heard.     No friction rub.   Pulmonary:      Effort: Pulmonary effort is normal.   Abdominal:      Palpations: Abdomen is soft.      Tenderness: There is generalized abdominal tenderness. There is no right CVA tenderness, left CVA tenderness, guarding or rebound.   Musculoskeletal:         General: Normal range of motion.      Cervical back: Normal range of motion.   Skin:     General: Skin is warm and dry.   Neurological:       Mental Status: She is alert and oriented to person, place, and time.   Psychiatric:         Mood and Affect: Mood normal.         Behavior: Behavior normal.               ED Course     Labs Reviewed   COMP METABOLIC PANEL (14) - Abnormal; Notable for the following components:       Result Value    Glucose 102 (*)     Bilirubin, Total 1.4 (*)     Albumin 4.9 (*)     All other components within normal limits   URINALYSIS, ROUTINE - Abnormal; Notable for the following components:    Clarity Urine Turbid (*)     Blood Urine 1+ (*)     Protein Urine 20 (*)     Leukocyte Esterase Urine 500 (*)     RBC Urine 3-5 (*)     Squamous Epi. Cells Few (*)     All other components within normal limits   CBC W/ DIFFERENTIAL - Abnormal; Notable for the following components:    Neutrophil Absolute Prelim 8.74 (*)     Neutrophil Absolute 8.74 (*)     Lymphocyte Absolute 0.55 (*)     All other components within normal limits   LIPASE - Normal   POCT PREGNANCY URINE - Normal   SARS-COV-2/FLU A AND B/RSV BY PCR (GENEXPERT) - Normal    Narrative:     This test is intended for the qualitative detection and differentiation of SARS-CoV-2, influenza A, influenza B, and respiratory syncytial virus (RSV) viral RNA in nasopharyngeal or nares swabs from individuals suspected of respiratory viral infection consistent with COVID-19 by their healthcare provider. Signs and symptoms of respiratory viral infection due to SARS-CoV-2, influenza, and RSV can be similar.    Test performed using the Xpert Xpress SARS-CoV-2/FLU/RSV (real time RT-PCR)  assay on the Gene[employer]pert instrument, [employer], [place] [address].   This test is being used under the Food and Drug Administration's Emergency Use Authorization.    The authorized Fact Sheet for Healthcare Providers for this assay is available upon request from the laboratory.   C. DIFFICILE(TOXIGENIC)PCR - Normal   CBC WITH DIFFERENTIAL WITH PLATELET    Narrative:     The following orders were created for panel  order CBC With Differential With Platelet.  Procedure                               Abnormality         Status                     ---------                               -----------         ------                     CBC W/ DIFFERENTIAL[[id number]]          Abnormal            Final result                 Please view results for these tests on the individual orders.   RAINBOW DRAW BLUE   RAINBOW DRAW GOLD   STOOL CULTURE W/SHIGATOXIN    Narrative:     The following orders were created for panel order Stool Culture W/Shigatoxin.  Procedure                               Abnormality         Status                     ---------                               -----------         ------                     Stool Culture[[id number]]                                    In process                 Shigatoxin[[id number]]                                       In process                   Please view results for these tests on the individual orders.   STOOL CULTURE(P)   SHIGATOXIN     CT ABDOMEN+PELVIS KIDNEYSTONE 2D RNDR(NO IV,NO ORAL)(CPT=74176)    Result Date: 1/3/2024  PROCEDURE:   CT ABDOMEN + PELVIS KIDNEYSTONE 2D RNDR (NO IV NO ORAL) (CPT=74176)  COMPARISON: [facility],  KIDNEY/BLADDER (CPT=76770), 4/30/2018, 6:59 PM.  [facility] , CT, CT ABDOMEN PELVIS IV CONTRAST, NO ORAL (ER), 6/21/2019, 10:41 PM.  [facility], CT ABDOMEN + PELVIS KIDNEYSTONE 2D RNDR (NO IV NO ORAL) (CPT=741, 7/20/2023, 0:43 AM.  INDICATIONS: Generalized abdominal pain, nausea vomiting and diarrhea.  TECHNIQUE:   CT images of the abdomen and pelvis were obtained without intravenous contrast material.  Automated exposure control for dose reduction was used. Adjustment of the mA and/or kV was done based on the patient's size. Use of iterative reconstruction technique for dose reduction was used. Dose information is transmitted to the ACR (American College of Radiology) NRDR (National Radiology Data Registry) which  includes the Dose Index Registry.   FINDINGS:  URINARY TRACT: There is a punctate nonobstructing left upper pole renal calculus on image 72 series 5, image 65 series 6. There is a punctate nonobstructing right renal calculus on image 65 series 5. Multiple water attenuation renal lesions which are not  significantly changed and compatible with cysts bilaterally.  A 1.1 cm hyperattenuating right lower pole renal lesion measuring 71 Hounsfield units is unchanged and compatible with a mildly complicated hemorrhagic cyst.  In the lateral lower pole of the  left kidney there is a 1.8 cm stable renal lesion measuring 47 Hounsfield units which is incompletely characterized.  No contour deforming renal mass. No hydroureteronephrosis or urinary tract calculus. No abnormality in the unenhanced bladder. ADRENALS: Normal unenhanced adrenals.  LIVER: Normal unenhanced liver. BILIARY: No evidence for biliary dilatation. Post cholecystectomy. PANCREAS: Normal unenhanced pancreas.  SPLEEN: Few punctate calcified splenic granulomas. AORTA/VASCULAR: No aneurysm. LYMPHADENOPATHY: None. GI/MESENTERY: Sleeve gastrectomy.  Small -moderate hiatal hernia.  Increased fluid in small bowel.  Normal appendix. No obstruction, bowel wall thickening or mesenteric mass.  ABDOMINAL WALL: Tiny fat containing umbilical hernia. ASCITES:   None PELVIC ORGANS: No suspect pelvic mass.  Intrauterine device. BONES: Bilateral L5 spondylolysis with grade 1 spondylolisthesis of L5 on S1.  Advanced L5-S1 disc degeneration.  Moderate left and mild right L5 foraminal narrowing. LUNG BASES: Small bibasilar pleural effusions with compressive atelectasis. OTHER: Negative.          CONCLUSION:  1. Nonspecific increase in small bowel fluid suggests a mild infectious enteritis. 2. Small bibasilar pleural effusions with compressive atelectasis. 3. Punctate nonobstructing bilateral upper pole renal calculi..  No ureteral calculus or obstruction. 4. A 1.8 cm incompletely  characterized stable left lower pole renal lesion may represent a complex cyst.  Follow-up ultrasound recommended to further evaluate. 5. Other multiple simple renal cysts and a few stable mildly complicated cysts. 6. Small to moderate hiatal hernia .  Post sleeve gastrectomy.    Dictated by (CST): Prabhu Herrera MD on 1/03/2024 at 2:46 PM     Finalized by (CST): Prabhu Herrera MD on 1/03/2024 at 2:58 PM                          [facility]                                         Medical Decision Making  33-year-old female presenting with nausea, vomiting, diarrhea, abdominal cramping.  Differentials include gastroenteritis versus influenza versus colitis versus other.  On exam, patient does appear dehydrated.  Labs overall reassuring.  Given reports over 30 episodes of diarrhea C. difficile and stool culture was obtained.  C. difficile negative today.  CT does show signs of possible infectious enteritis.  Symptoms largely improved with IV fluids, Zofran, Bentyl, and Toradol.  Will discharge home with Rx for Zofran and Bentyl.  Symptomatic care and return precautions discussed.    Amount and/or Complexity of Data Reviewed  Labs: ordered. Decision-making details documented in ED Course.  Radiology: ordered. Decision-making details documented in ED Course.    Risk  Prescription drug management.  Parenteral controlled substances.        Disposition and Plan     Clinical Impression:  1. Acute gastroenteritis         Disposition:  Discharge  1/3/2024  4:19 pm    Follow-up:  Mable Granados PA  [address] [place] [id number]  [phone]    Follow up  As needed    [facility] Emergency Department  [address] [id number] [phone]  Follow up  If symptoms worsen          Medications Prescribed:  Current Discharge Medication List        START taking these medications    Details   ondansetron 4 MG Oral Tablet Dispersible Take 1 tablet (4 mg total) by mouth every 4 (four) hours  as needed for Nausea.  Qty: 10 tablet, Refills: 0    Associated Diagnoses: Acute gastroenteritis      dicyclomine 20 MG Oral Tab Take 1 tablet (20 mg total) by mouth 4 (four) times daily as needed.  Qty: 30 tablet, Refills: 0    Associated Diagnoses: Acute gastroenteritis

## 2024-01-03 NOTE — ED PROVIDER NOTES
Patient Seen in: [facility] [place] [facility]      History     Chief Complaint   Patient presents with    Nausea/Vomiting/Diarrhea     Stated Complaint: Diarrhea, vomiting, chills    Subjective:   HPI  Fabi Lees is a 33 year old female here for elevated temp, nausea, vomiting, diarrhea, chills, and bodyaches that started yesterday.  She is also having severe abdominal cramping radiating to her back.  History of renal calculi, polycystic ovarian syndrome, cholecystitis with cholecystectomy. She feels dehydrated.  Also experiencing twitching, and muscle spasms.  Unsure what makes symptoms worse or better.  No binge drinking episodes, or drug use.  Admits recent travel, but no known suspicious food intake.  Immunizations up-to-date.  Medical history includes but limited to anxiety, GERD, low back osteoarthritis, and renal cyst.  No new medications, or lack thereof.      Objective:   Past Medical History:   Diagnosis Date    Anxiety     Back problem     Calculus of kidney     small stones, not causing pain    Depression     stress related     Esophageal reflux     Osteoarthritis     lower back pain    PKD (polycystic kidney disease)     is seeing Dr Chow at [place] for this    PONV (postoperative nausea and vomiting)     severe nausea and vomiting; experienced shakes/\"seizure like reactions    Renal disorder     cysts on both kidneys    S/P gastroplasty 07/25/2016    for weight loss              Past Surgical History:   Procedure Laterality Date    CHOLECYSTECTOMY  04/10/17    DENTAL SURGERY PROCEDURE      FOREARM/WRIST SURGERY UNLISTED Left     cyst removal    LAP SLEEVE GASTRECTOMY  07/25/2016    Dr Ruano    NAIL REMOVAL      OTHER Right 02/2018    right hip repair    OTHER  12/2017    skin removal bilateral arms and legs post lap sleeve gastrectomy    OTHER SURGICAL HISTORY      ganglion cyst removed                Social History     Socioeconomic History    Marital status:    Tobacco Use    Smoking  status: Never    Smokeless tobacco: Never   Vaping Use    Vaping Use: Never used   Substance and Sexual Activity    Alcohol use: Yes     Alcohol/week: 0.0 standard drinks of alcohol     Comment: very little amount and rarely    Drug use: No    Sexual activity: Yes     Partners: Male   Other Topics Concern    Pt has a pacemaker No    Pt has a defibrillator No    Reaction to local anesthetic No              Review of Systems    Positive for stated complaint: Diarrhea, vomiting, chills  Other systems are as noted in HPI.  Constitutional and vital signs reviewed.      All other systems reviewed and negative except as noted above.    Physical Exam     ED Triage Vitals [01/03/24 1019]   /66   Pulse (!) 130   Resp 20   Temp 98 °F (36.7 °C)   Temp src Temporal   SpO2 99 %   O2 Device None (Room air)       Current:/66   Pulse 120   Temp 98 °F (36.7 °C) (Temporal)   Resp 20   SpO2 99%         Physical Exam  Vitals and nursing note reviewed.   Constitutional:       Appearance: Normal appearance. She is obese. She is ill-appearing and diaphoretic (mild). She is not toxic-appearing.   HENT:      Head: Normocephalic.      Nose: Nose normal.      Mouth/Throat:      Comments: Sticky mucous membranes.  No obstruction.  Eyes:      Pupils: Pupils are equal, round, and reactive to light.   Cardiovascular:      Rate and Rhythm: Regular rhythm. Tachycardia present.      Pulses: Normal pulses.   Pulmonary:      Effort: Pulmonary effort is normal.   Abdominal:      General: There is distension.      Tenderness: There is abdominal tenderness in the right upper quadrant, right lower quadrant, epigastric area, left upper quadrant and left lower quadrant. There is guarding.   Musculoskeletal:      Cervical back: Normal range of motion.      Comments: Decreased range of motion due to abdominal pain, and back spasm.    Skin:     Capillary Refill: Capillary refill takes less than 2 seconds.   Neurological:      General: No focal  deficit present.      Mental Status: She is alert and oriented to person, place, and time.      Cranial Nerves: Cranial nerves 2-12 are intact.      Motor: Motor function is intact.      Coordination: Coordination is intact.      Gait: Gait is intact.   Psychiatric:         Mood and Affect: Mood normal.         Behavior: Behavior normal.         Thought Content: Thought content normal.         Judgment: Judgment normal.               ED Course     Labs Reviewed   POCT FLU TEST - Normal    Narrative:     This assay is a rapid molecular in vitro test utilizing nucleic acid amplification of influenza A and B viral RNA.                      MDM         Medical Decision Making  [facility] in [place] to [place] for further evaluation of nausea, vomiting, back pain, and abdominal pain with tenderness.    Differential diagnosis includes but not limited to CBD obstruction, diverticulitis, intestinal perforation, appendicitis, gastric sleeve gastrostomy obstruction, gastroenteritis, Salmonella, influenza, COVID, and/or atypical strep.    Fabi is of sound mind with decision-making capabilities.  We discussed limitations of the urgent care, and agreed to higher level of evaluation. no suspicion of altered mental status, drug use, or alcohol use.  Feel safe going to [facility], and comfortable getting there by private vehicle.  Clear to stay NPO.  Urine was not obtained from this visit, and Zofran was not ordered. Stable for self transfer.     Problems Addressed:  Abdominal pain, generalized: acute illness or injury  Diarrhea: acute illness or injury  History of removal of laparoscopic gastric banding device: chronic illness or injury  Hx of cholecystectomy: self-limited or minor problem  Nausea and vomiting, unspecified vomiting type: acute illness or injury    Amount and/or Complexity of Data Reviewed  External Data Reviewed: notes.        Disposition and Plan     Clinical Impression:  1. Abdominal  pain, generalized    2. Diarrhea    3. Hx of cholecystectomy    4. History of removal of laparoscopic gastric banding device    5. Nausea and vomiting, unspecified vomiting type         Disposition:  Ic to ed  1/3/2024 10:42 am    Follow-up:  No follow-up provider specified.        Medications Prescribed:  Discharge Medication List as of 1/3/2024 10:47 AM

## 2024-01-03 NOTE — ED INITIAL ASSESSMENT (HPI)
Patient presents to ER from , for further eval d/t abdominal tenderness.   Patient admits to diarrhea and vomiting.   Concerns for dehydration.   Unable to tolerate anything PO.

## 2024-01-03 NOTE — ED QUICK NOTES
Pt driving self to [facility] ED for higher level of care.

## 2024-01-20 ENCOUNTER — APPOINTMENT (OUTPATIENT)
Dept: GENERAL RADIOLOGY | Facility: HOSPITAL | Age: 34
End: 2024-01-20
Attending: EMERGENCY MEDICINE
Payer: COMMERCIAL

## 2024-01-20 PROCEDURE — 71045 X-RAY EXAM CHEST 1 VIEW: CPT | Performed by: EMERGENCY MEDICINE

## 2024-03-12 ENCOUNTER — HOSPITAL ENCOUNTER (EMERGENCY)
Facility: HOSPITAL | Age: 34
Discharge: HOME | End: 2024-03-12
Attending: EMERGENCY MEDICINE
Payer: COMMERCIAL

## 2024-03-12 ENCOUNTER — APPOINTMENT (OUTPATIENT)
Dept: GENERAL RADIOLOGY | Facility: HOSPITAL | Age: 34
End: 2024-03-12
Attending: EMERGENCY MEDICINE
Payer: COMMERCIAL

## 2024-03-12 VITALS
DIASTOLIC BLOOD PRESSURE: 83 MMHG | RESPIRATION RATE: 18 BRPM | WEIGHT: 293 LBS | SYSTOLIC BLOOD PRESSURE: 127 MMHG | HEART RATE: 61 BPM | BODY MASS INDEX: 41.02 KG/M2 | HEIGHT: 71 IN | TEMPERATURE: 98 F | OXYGEN SATURATION: 98 %

## 2024-03-12 DIAGNOSIS — K59.00 CONSTIPATION, UNSPECIFIED CONSTIPATION TYPE: Primary | ICD-10-CM

## 2024-03-12 LAB
ANION GAP SERPL CALC-SCNC: 7 MMOL/L (ref 0–18)
BASOPHILS # BLD AUTO: 0.03 X10(3) UL (ref 0–0.2)
BASOPHILS NFR BLD AUTO: 0.4 %
BILIRUB UR QL: NEGATIVE
BUN BLD-MCNC: 15 MG/DL (ref 9–23)
BUN/CREAT SERPL: 15.2 (ref 10–20)
CALCIUM BLD-MCNC: 9.4 MG/DL (ref 8.7–10.4)
CHLORIDE SERPL-SCNC: 108 MMOL/L (ref 98–112)
CLARITY UR: CLEAR
CO2 SERPL-SCNC: 28 MMOL/L (ref 21–32)
COLOR UR: YELLOW
CREAT BLD-MCNC: 0.99 MG/DL
DEPRECATED RDW RBC AUTO: 38.6 FL (ref 35.1–46.3)
EGFRCR SERPLBLD CKD-EPI 2021: 78 ML/MIN/1.73M2 (ref 60–?)
EOSINOPHIL # BLD AUTO: 0.24 X10(3) UL (ref 0–0.7)
EOSINOPHIL NFR BLD AUTO: 3.6 %
ERYTHROCYTE [DISTWIDTH] IN BLOOD BY AUTOMATED COUNT: 12.2 % (ref 11–15)
GLUCOSE BLD-MCNC: 83 MG/DL (ref 70–99)
GLUCOSE UR-MCNC: NORMAL MG/DL
HCT VFR BLD AUTO: 40.2 %
HGB BLD-MCNC: 13.5 G/DL
HGB UR QL STRIP.AUTO: NEGATIVE
IMM GRANULOCYTES # BLD AUTO: 0.01 X10(3) UL (ref 0–1)
IMM GRANULOCYTES NFR BLD: 0.1 %
KETONES UR-MCNC: NEGATIVE MG/DL
LEUKOCYTE ESTERASE UR QL STRIP.AUTO: NEGATIVE
LYMPHOCYTES # BLD AUTO: 2.2 X10(3) UL (ref 1–4)
LYMPHOCYTES NFR BLD AUTO: 32.5 %
MCH RBC QN AUTO: 29.2 PG (ref 26–34)
MCHC RBC AUTO-ENTMCNC: 33.6 G/DL (ref 31–37)
MCV RBC AUTO: 86.8 FL
MONOCYTES # BLD AUTO: 0.32 X10(3) UL (ref 0.1–1)
MONOCYTES NFR BLD AUTO: 4.7 %
NEUTROPHILS # BLD AUTO: 3.96 X10 (3) UL (ref 1.5–7.7)
NEUTROPHILS # BLD AUTO: 3.96 X10(3) UL (ref 1.5–7.7)
NEUTROPHILS NFR BLD AUTO: 58.7 %
NITRITE UR QL STRIP.AUTO: NEGATIVE
OSMOLALITY SERPL CALC.SUM OF ELEC: 296 MOSM/KG (ref 275–295)
PH UR: 6 [PH] (ref 5–8)
PLATELET # BLD AUTO: 259 10(3)UL (ref 150–450)
POTASSIUM SERPL-SCNC: 3.6 MMOL/L (ref 3.5–5.1)
PROT UR-MCNC: 30 MG/DL
RBC # BLD AUTO: 4.63 X10(6)UL
SODIUM SERPL-SCNC: 143 MMOL/L (ref 136–145)
SP GR UR STRIP: >1.03 (ref 1–1.03)
UROBILINOGEN UR STRIP-ACNC: NORMAL
WBC # BLD AUTO: 6.8 X10(3) UL (ref 4–11)

## 2024-03-12 PROCEDURE — 80048 BASIC METABOLIC PNL TOTAL CA: CPT | Performed by: EMERGENCY MEDICINE

## 2024-03-12 PROCEDURE — 85025 COMPLETE CBC W/AUTO DIFF WBC: CPT | Performed by: EMERGENCY MEDICINE

## 2024-03-12 PROCEDURE — 96374 THER/PROPH/DIAG INJ IV PUSH: CPT

## 2024-03-12 PROCEDURE — 99284 EMERGENCY DEPT VISIT MOD MDM: CPT

## 2024-03-12 PROCEDURE — 81001 URINALYSIS AUTO W/SCOPE: CPT | Performed by: EMERGENCY MEDICINE

## 2024-03-12 PROCEDURE — 74018 RADEX ABDOMEN 1 VIEW: CPT | Performed by: EMERGENCY MEDICINE

## 2024-03-12 PROCEDURE — 96375 TX/PRO/DX INJ NEW DRUG ADDON: CPT

## 2024-03-12 PROCEDURE — 99285 EMERGENCY DEPT VISIT HI MDM: CPT

## 2024-03-12 RX ORDER — MORPHINE SULFATE 4 MG/ML
4 INJECTION, SOLUTION INTRAMUSCULAR; INTRAVENOUS ONCE
Status: COMPLETED | OUTPATIENT
Start: 2024-03-12 | End: 2024-03-12

## 2024-03-12 RX ORDER — DIPHENHYDRAMINE HYDROCHLORIDE 50 MG/ML
25 INJECTION INTRAMUSCULAR; INTRAVENOUS ONCE
Status: COMPLETED | OUTPATIENT
Start: 2024-03-12 | End: 2024-03-12

## 2024-03-12 NOTE — ED PROVIDER NOTES
Patient Seen in: [facility] Emergency Department      History     Chief Complaint   Patient presents with    Urinary Symptoms           Constipation     Stated Complaint: Retention/constipation (hip surgery 5 days ago)    Subjective:   HPI    Patient is a 33-year-old female that had a left hip labrum surgery about 5 days ago.  She was taking narcotic pain medicine since.  She complains of difficulty urinating.  She states she sits on the toilet for extended period trying to push and very little urine output.  She also states she is constipated.  She has not moved her bowels in 8 days.  She contacted her nephrologist and was told to go to the ER as she may need a catheter.    Objective:   Past Medical History:   Diagnosis Date    Anxiety     Back problem     Calculus of kidney     small stones, not causing pain    Depression     stress related     Esophageal reflux     Osteoarthritis     lower back pain    PKD (polycystic kidney disease)     is seeing Dr Chow at [place] for this    PONV (postoperative nausea and vomiting)     severe nausea and vomiting; experienced shakes/\"seizure like reactions    Renal disorder     cysts on both kidneys    S/P gastroplasty 07/25/2016    for weight loss              Past Surgical History:   Procedure Laterality Date    CHOLECYSTECTOMY  04/10/17    DENTAL SURGERY PROCEDURE      FOREARM/WRIST SURGERY UNLISTED Left     cyst removal    LAP SLEEVE GASTRECTOMY  07/25/2016    Dr Ruano    NAIL REMOVAL      OTHER Right 02/2018    right hip repair    OTHER  12/2017    skin removal bilateral arms and legs post lap sleeve gastrectomy    OTHER SURGICAL HISTORY      ganglion cyst removed                Social History     Socioeconomic History    Marital status:    Tobacco Use    Smoking status: Never    Smokeless tobacco: Never   Vaping Use    Vaping Use: Never used   Substance and Sexual Activity    Alcohol use: Yes     Alcohol/week: 0.0 standard drinks of alcohol     Comment:  very little amount and rarely    Drug use: No    Sexual activity: Yes     Partners: Male   Other Topics Concern    Pt has a pacemaker No    Pt has a defibrillator No    Reaction to local anesthetic No              Review of Systems    Positive for stated complaint: Retention/constipation (hip surgery 5 days ago)  Other systems are as noted in HPI.  Constitutional and vital signs reviewed.      All other systems reviewed and negative except as noted above.    Physical Exam     ED Triage Vitals [03/12/24 1310]   /89   Pulse 86   Resp 18   Temp 98.1 °F (36.7 °C)   Temp src Oral   SpO2 97 %   O2 Device None (Room air)       Current:/89   Pulse 86   Temp 98.1 °F (36.7 °C) (Oral)   Resp 18   Ht 180.3 cm (5' 11\")   Wt 134.7 kg   SpO2 97%   BMI 41.42 kg/m²         Physical Exam    Constitutional: Oriented to person, place, and time. Appears well-developed and well-nourished.   HEENT:   Head: Normocephalic and atraumatic.   Right Ear: External ear normal.   Left Ear: External ear normal.   Nose: Nose normal.   Mouth/Throat: Oropharynx is clear and moist.   Eyes: Conjunctivae and EOM are normal. Pupils are equal, round, and reactive to light.   Neck: Neck supple.   Cardiovascular: Normal rate, regular rhythm, normal heart sounds and intact distal pulses.    Pulmonary/Chest: Effort normal and breath sounds normal. No respiratory distress.   Abdominal: Soft. Bowel sounds are normal. Exhibits no distension and no mass. There is no tenderness. There is no rebound and no guarding.   Musculoskeletal: Left hip brace applied.  Lymphadenopathy: No cervical adenopathy.   Neurological: Alert and oriented to person, place, and time. Normal reflexes. No cranial nerve deficit. No motor os sensory defecits noted Coordination normal.   Skin: Skin is warm and dry.   Psychiatric: Normal mood and affect. Behavior is normal. Judgment and thought content normal.   Nursing note and vitals reviewed.        ED Course     Labs  Reviewed   BASIC METABOLIC PANEL (8) - Abnormal; Notable for the following components:       Result Value    Calculated Osmolality 296 (*)     All other components within normal limits   URINALYSIS WITH CULTURE REFLEX - Abnormal; Notable for the following components:    Spec Gravity >1.030 (*)     Protein Urine 30 (*)     Squamous Epi. Cells Few (*)     All other components within normal limits   CBC WITH DIFFERENTIAL WITH PLATELET    Narrative:     The following orders were created for panel order CBC With Differential With Platelet.  Procedure                               Abnormality         Status                     ---------                               -----------         ------                     CBC W/ DIFFERENTIAL[[id number]]                              Final result                 Please view results for these tests on the individual orders.   CBC W/ DIFFERENTIAL                      MDM      Use of independent historian:     I personally reviewed and interpreted the images : Moderate stool burden noted    XR ABDOMEN (1 VIEW) (CPT=74018)    Result Date: 3/12/2024  CONCLUSION:  1. No bowel obstruction or ileus.  Moderate feces throughout the colon compatible with mild to moderate constipation.    Dictated by (CST): Gordo Riley MD on 3/12/2024 at 4:01 PM     Finalized by (CST): Gordo Riley MD on 3/12/2024 at 4:02 PM           Vitals:    03/12/24 1310   BP: 143/89   Pulse: 86   Resp: 18   Temp: 98.1 °F (36.7 °C)   TempSrc: Oral   SpO2: 97%   Weight: 134.7 kg   Height: 180.3 cm (5' 11\")     *I personally reviewed and interpreted all ED vitals.    Pulse Ox: 97%, Room air, Normal       Differential Diagnosis/ Diagnostic Considerations: Patient with concerns of urinary retention will check bladder scan consider UTI consider constipation causing an uncomfortable feeling.    Medical Record Review: I personally reviewed available prior medical records for any recent pertinent discharge summaries, testing,  and procedures and reviewed those reports and found contact with her nephrologist Dr. Christian notes reviewed..    Complicating Factors: The patient already has recent hip surgery.  Which contribute to the complexity of this ED evaluation.    Social determinants of health:    Prescription drug management:      Shared Decision Making:    ED Course: Patient did have a bladder scan that showed only about 60 cc of urine.  She was adamant that she had the urge to urinate I suspect that maybe she had a UTI with her urgency.  She was cathed for urine there is about 90 cc urine output UA is unremarkable.  X-ray findings shared with her I suspect her constipation may be giving her her symptoms.  Will recommend MiraLAX or milk of magnesia and encouraged her to no limit her opioid use.    Discussion of management with other healthcare providers:    Condition upon leaving the department: Stable                                     Medical Decision Making      Disposition and Plan     Clinical Impression:  1. Constipation, unspecified constipation type         Disposition:  Discharge  3/12/2024  6:08 pm    Follow-up:  Mable Granados PA  [address] [place] [id number]  [phone]    Follow up in 2 day(s)            Medications Prescribed:  Current Discharge Medication List

## 2024-03-12 NOTE — ED INITIAL ASSESSMENT (HPI)
C/o urinary retention, sent in by nephrologist and constipation x8 days. Hx polycystic kidney disease and left hip surgery on 3/8. Patient reports last output was around 0800

## 2024-05-30 ENCOUNTER — LAB ENCOUNTER (OUTPATIENT)
Dept: LAB | Facility: HOSPITAL | Age: 34
End: 2024-05-30
Attending: Other
Payer: COMMERCIAL

## 2024-05-30 DIAGNOSIS — E66.01 MORBID OBESITY WITH BMI OF 40.0-44.9, ADULT (HCC): ICD-10-CM

## 2024-05-30 DIAGNOSIS — F43.9 STRESS: ICD-10-CM

## 2024-05-30 DIAGNOSIS — E44.1 PROTEIN-CALORIE MALNUTRITION, MILD (HCC): ICD-10-CM

## 2024-05-30 DIAGNOSIS — Z98.890 S/P GASTROPLASTY: ICD-10-CM

## 2024-05-30 DIAGNOSIS — R63.2 BINGE EATING: ICD-10-CM

## 2024-05-30 DIAGNOSIS — Z79.899 ENCOUNTER FOR LONG-TERM (CURRENT) DRUG USE: Primary | ICD-10-CM

## 2024-05-30 LAB
ALBUMIN SERPL-MCNC: 4.5 G/DL (ref 3.2–4.8)
ALBUMIN/GLOB SERPL: 1.7 {RATIO} (ref 1–2)
ALP LIVER SERPL-CCNC: 60 U/L
ALT SERPL-CCNC: 11 U/L
ANION GAP SERPL CALC-SCNC: 9 MMOL/L (ref 0–18)
AST SERPL-CCNC: 13 U/L (ref ?–34)
BILIRUB SERPL-MCNC: 0.5 MG/DL (ref 0.3–1.2)
BUN BLD-MCNC: 24 MG/DL (ref 9–23)
BUN/CREAT SERPL: 24.7 (ref 10–20)
CALCIUM BLD-MCNC: 9.8 MG/DL (ref 8.7–10.4)
CHLORIDE SERPL-SCNC: 108 MMOL/L (ref 98–112)
CO2 SERPL-SCNC: 24 MMOL/L (ref 21–32)
CREAT BLD-MCNC: 0.97 MG/DL
EGFRCR SERPLBLD CKD-EPI 2021: 80 ML/MIN/1.73M2 (ref 60–?)
FASTING STATUS PATIENT QL REPORTED: NO
GLOBULIN PLAS-MCNC: 2.7 G/DL (ref 2–3.5)
GLUCOSE BLD-MCNC: 100 MG/DL (ref 70–99)
LITHIUM SERPL-SCNC: 0.3 MMOL/L (ref 0.6–1.2)
OSMOLALITY SERPL CALC.SUM OF ELEC: 296 MOSM/KG (ref 275–295)
POTASSIUM SERPL-SCNC: 3.6 MMOL/L (ref 3.5–5.1)
PROT SERPL-MCNC: 7.2 G/DL (ref 5.7–8.2)
SODIUM SERPL-SCNC: 141 MMOL/L (ref 136–145)
T4 FREE SERPL-MCNC: 1 NG/DL (ref 0.8–1.7)
TSI SER-ACNC: 3.47 MIU/ML (ref 0.55–4.78)
VIT B12 SERPL-MCNC: 465 PG/ML (ref 211–911)

## 2024-05-30 PROCEDURE — 84425 ASSAY OF VITAMIN B-1: CPT | Performed by: INTERNAL MEDICINE

## 2024-05-30 PROCEDURE — 82607 VITAMIN B-12: CPT | Performed by: INTERNAL MEDICINE

## 2024-06-02 LAB — VITAMIN B1 WHOLE BLD: 86.9 NMOL/L

## 2024-06-04 ENCOUNTER — LAB ENCOUNTER (OUTPATIENT)
Dept: LAB | Facility: HOSPITAL | Age: 34
End: 2024-06-04
Attending: Other
Payer: COMMERCIAL

## 2024-06-04 DIAGNOSIS — Z79.899 NEED FOR PROPHYLACTIC CHEMOTHERAPY: Primary | ICD-10-CM

## 2024-06-04 LAB
ALBUMIN SERPL-MCNC: 4.4 G/DL (ref 3.2–4.8)
ALBUMIN/GLOB SERPL: 1.6 {RATIO} (ref 1–2)
ALP LIVER SERPL-CCNC: 57 U/L
ALT SERPL-CCNC: 13 U/L
ANION GAP SERPL CALC-SCNC: 6 MMOL/L (ref 0–18)
AST SERPL-CCNC: 17 U/L (ref ?–34)
BILIRUB SERPL-MCNC: 0.7 MG/DL (ref 0.3–1.2)
BUN BLD-MCNC: 14 MG/DL (ref 9–23)
BUN/CREAT SERPL: 13.6 (ref 10–20)
CALCIUM BLD-MCNC: 9.7 MG/DL (ref 8.7–10.4)
CHLORIDE SERPL-SCNC: 107 MMOL/L (ref 98–112)
CO2 SERPL-SCNC: 27 MMOL/L (ref 21–32)
CREAT BLD-MCNC: 1.03 MG/DL
EGFRCR SERPLBLD CKD-EPI 2021: 74 ML/MIN/1.73M2 (ref 60–?)
FASTING STATUS PATIENT QL REPORTED: NO
GLOBULIN PLAS-MCNC: 2.8 G/DL (ref 2–3.5)
GLUCOSE BLD-MCNC: 100 MG/DL (ref 70–99)
LITHIUM SERPL-SCNC: 0.6 MMOL/L (ref 0.6–1.2)
OSMOLALITY SERPL CALC.SUM OF ELEC: 291 MOSM/KG (ref 275–295)
POTASSIUM SERPL-SCNC: 3.6 MMOL/L (ref 3.5–5.1)
PROT SERPL-MCNC: 7.2 G/DL (ref 5.7–8.2)
SODIUM SERPL-SCNC: 140 MMOL/L (ref 136–145)

## 2024-06-04 PROCEDURE — 80053 COMPREHEN METABOLIC PANEL: CPT

## 2024-06-04 PROCEDURE — 36415 COLL VENOUS BLD VENIPUNCTURE: CPT

## 2024-06-04 PROCEDURE — 80178 ASSAY OF LITHIUM: CPT

## 2024-07-06 ENCOUNTER — HOSPITAL ENCOUNTER (OUTPATIENT)
Age: 34
Discharge: HOME | End: 2024-07-06

## 2024-07-06 VITALS
DIASTOLIC BLOOD PRESSURE: 86 MMHG | RESPIRATION RATE: 20 BRPM | WEIGHT: 290 LBS | BODY MASS INDEX: 40.6 KG/M2 | TEMPERATURE: 101 F | HEIGHT: 71 IN | OXYGEN SATURATION: 99 % | HEART RATE: 129 BPM | SYSTOLIC BLOOD PRESSURE: 146 MMHG

## 2024-07-06 DIAGNOSIS — U07.1 COVID-19 VIRUS INFECTION: Primary | ICD-10-CM

## 2024-07-06 RX ORDER — ALBUTEROL SULFATE 90 UG/1
2 AEROSOL, METERED RESPIRATORY (INHALATION) EVERY 4 HOURS PRN
Qty: 1 EACH | Refills: 0 | Status: SHIPPED | OUTPATIENT
Start: 2024-07-06 | End: 2024-08-05

## 2024-07-06 RX ORDER — ACETAMINOPHEN 325 MG/1
650 TABLET ORAL ONCE
Status: COMPLETED | OUTPATIENT
Start: 2024-07-06 | End: 2024-07-06

## 2024-07-06 RX ORDER — BENZONATATE 200 MG/1
200 CAPSULE ORAL 3 TIMES DAILY PRN
Qty: 15 CAPSULE | Refills: 0 | Status: SHIPPED | OUTPATIENT
Start: 2024-07-06

## 2024-07-06 NOTE — ED PROVIDER NOTES
Patient Seen in: [facility]      History     Chief Complaint   Patient presents with    Cough/URI     Stated Complaint: cough    Subjective:   HPI  Patient is an 34-year-old female that presents to the immediate care center today with concern for fever, chills, cough, congestion that started 4 days ago. Pt traveled home today by airplane from [place] where she was exposed to someone who was ill with nondiagnosed illness..  Pt. has been eating and drinking without difficulty.  She has felt wheezy, but no difficulty breathing; no abdominal or back pain; no headache or dizziness.          Objective:   Past Medical History:    Anxiety    Back problem    Calculus of kidney    small stones, not causing pain    Depression    stress related     Esophageal reflux    Osteoarthritis    lower back pain    PKD (polycystic kidney disease)    is seeing Dr Chow at [place] for this    PONV (postoperative nausea and vomiting)    severe nausea and vomiting; experienced shakes/\"seizure like reactions    Renal disorder    cysts on both kidneys    S/P gastroplasty    for weight loss              Past Surgical History:   Procedure Laterality Date    Cholecystectomy  04/10/17    Dental surgery procedure      Forearm/wrist surgery unlisted Left     cyst removal    Lap sleeve gastrectomy  07/25/2016    Dr Ruano    Nail removal      Other Right 02/2018    right hip repair    Other  12/2017    skin removal bilateral arms and legs post lap sleeve gastrectomy    Other surgical history      ganglion cyst removed                Social History     Socioeconomic History    Marital status:    Tobacco Use    Smoking status: Never    Smokeless tobacco: Never   Vaping Use    Vaping status: Never Used   Substance and Sexual Activity    Alcohol use: Yes     Alcohol/week: 0.0 standard drinks of alcohol     Comment: very little amount and rarely    Drug use: No    Sexual activity: Yes     Partners: Male   Other Topics Concern    Pt has  a pacemaker No    Pt has a defibrillator No    Reaction to local anesthetic No     Social Determinants of Health      [facility], [facility]    Housing Stability              Review of Systems   Constitutional:  Positive for fatigue. Negative for appetite change, chills and fever.   HENT:  Positive for congestion and sinus pressure.    Respiratory:  Positive for cough and wheezing.    Gastrointestinal:  Negative for abdominal pain.   Musculoskeletal:  Negative for arthralgias and myalgias.   Skin:  Negative for rash.   Neurological:  Negative for dizziness, weakness and headaches.       Positive for stated Chief Complaint: Cough/URI    Other systems are as noted in HPI.  Constitutional and vital signs reviewed.      All other systems reviewed and negative except as noted above.    Physical Exam     ED Triage Vitals [07/06/24 1515]   /86   Pulse (!) 129   Resp 20   Temp (!) 100.9 °F (38.3 °C)   Temp src Oral   SpO2 99 %   O2 Device None (Room air)       Current Vitals:   Vital Signs  BP: 146/86  Pulse: (!) 129  Resp: 20  Temp: (!) 100.9 °F (38.3 °C)  Temp src: Oral    Oxygen Therapy  SpO2: 99 %  O2 Device: None (Room air)            Physical Exam  Vitals and nursing note reviewed.   Constitutional:       General: She is not in acute distress.     Appearance: She is not ill-appearing.   HENT:      Right Ear: Tympanic membrane and ear canal normal.      Left Ear: Tympanic membrane and ear canal normal.      Nose: Nose normal.   Eyes:      Conjunctiva/sclera: Conjunctivae normal.   Pulmonary:      Effort: Pulmonary effort is normal. No respiratory distress.   Musculoskeletal:      Cervical back: Normal range of motion and neck supple.   Skin:     General: Skin is warm and dry.      Findings: No rash.   Neurological:      Mental Status: She is alert and oriented to person, place, and time.               ED Course   Labs Reviewed - No data to display                    MDM                                         Medical Decision Making  Differential diagnoses considered included, but are not exclusive of: bacterial vs viral sinusitis, dehydration, pneumonia, influenza, Covid-19 infection, and other viral upper respiratory infection.       Problems Addressed:  COVID-19 virus infection: self-limited or minor problem    Amount and/or Complexity of Data Reviewed  Labs:  Decision-making details documented in ED Course.    Risk  OTC drugs.  Prescription drug management.        Disposition and Plan     Clinical Impression:  1. COVID-19 virus infection         Disposition:  Discharge  7/6/2024  3:39 pm    Follow-up:  Mable Granados PA  [address] [place] [id number]  [phone]      As needed          Medications Prescribed:  Discharge Medication List as of 7/6/2024  3:39 PM        START taking these medications    Details   albuterol 108 (90 Base) MCG/ACT Inhalation Aero Soln Inhale 2 puffs into the lungs every 4 (four) hours as needed for Wheezing., Normal, Disp-1 each, R-0      benzonatate 200 MG Oral Cap Take 1 capsule (200 mg total) by mouth 3 (three) times daily as needed for cough., Normal, Disp-15 capsule, R-0

## 2024-07-06 NOTE — ED INITIAL ASSESSMENT (HPI)
Patient presents to IC with c/o cough, congestion, body aches x 4 days.  Patient had a positive covid test at home.

## 2024-07-10 ENCOUNTER — APPOINTMENT (OUTPATIENT)
Dept: GENERAL RADIOLOGY | Age: 34
End: 2024-07-10
Attending: NURSE PRACTITIONER

## 2024-07-10 ENCOUNTER — HOSPITAL ENCOUNTER (OUTPATIENT)
Age: 34
Discharge: HOME | End: 2024-07-10

## 2024-07-10 VITALS
TEMPERATURE: 98 F | DIASTOLIC BLOOD PRESSURE: 88 MMHG | SYSTOLIC BLOOD PRESSURE: 137 MMHG | HEART RATE: 78 BPM | OXYGEN SATURATION: 100 % | RESPIRATION RATE: 18 BRPM

## 2024-07-10 DIAGNOSIS — U07.1 COVID: ICD-10-CM

## 2024-07-10 DIAGNOSIS — N39.0 ACUTE UTI: Primary | ICD-10-CM

## 2024-07-10 LAB
B-HCG UR QL: NEGATIVE
BILIRUB UR QL STRIP: NEGATIVE
COLOR UR: YELLOW
DDIMER WHOLE BLOOD: <200 NG/ML DDU (ref ?–400)
GLUCOSE UR STRIP-MCNC: NEGATIVE MG/DL
HGB UR QL STRIP: NEGATIVE
KETONES UR STRIP-MCNC: NEGATIVE MG/DL
NITRITE UR QL STRIP: NEGATIVE
PH UR STRIP: 6 [PH]
PROT UR STRIP-MCNC: NEGATIVE MG/DL
SP GR UR STRIP: 1.02
UROBILINOGEN UR STRIP-ACNC: <2 MG/DL

## 2024-07-10 PROCEDURE — 87086 URINE CULTURE/COLONY COUNT: CPT | Performed by: NURSE PRACTITIONER

## 2024-07-10 PROCEDURE — 81002 URINALYSIS NONAUTO W/O SCOPE: CPT | Performed by: NURSE PRACTITIONER

## 2024-07-10 PROCEDURE — 71046 X-RAY EXAM CHEST 2 VIEWS: CPT | Performed by: NURSE PRACTITIONER

## 2024-07-10 PROCEDURE — 99214 OFFICE O/P EST MOD 30 MIN: CPT | Performed by: NURSE PRACTITIONER

## 2024-07-10 PROCEDURE — 81025 URINE PREGNANCY TEST: CPT | Performed by: NURSE PRACTITIONER

## 2024-07-10 RX ORDER — SULFAMETHOXAZOLE AND TRIMETHOPRIM 800; 160 MG/1; MG/1
1 TABLET ORAL 2 TIMES DAILY
Qty: 14 TABLET | Refills: 0 | Status: SHIPPED | OUTPATIENT
Start: 2024-07-10 | End: 2024-07-17

## 2024-07-10 NOTE — ED INITIAL ASSESSMENT (HPI)
Patient is here with right lower back pain and a worsening cough.  She was diagnosed with covid on Saturday.  She states she has coughed up blood x 2 today.  She did a virtual doctors appointment today with her doctor and he said she should have a chest xray.

## 2024-07-11 NOTE — DISCHARGE INSTRUCTIONS
Push fluids.  Rest.  Tylenol or ibuprofen as needed for pain or fever.  Take the antibiotics as prescribed.  A urine culture is pending.  Follow-up closely with your doctor.  If you develop any worsening or concerning symptoms, please go to the nearest emergency department for further evaluation.

## 2024-07-11 NOTE — ED PROVIDER NOTES
Patient Seen in: [facility]      History     Chief Complaint   Patient presents with    Urinary Symptoms     Stated Complaint: Covid Unrinary Symptoms  Subjective:   34-year-old female presents for multiple complaints.  She states she was diagnosed with COVID 4 days ago.  She states prior to COVID, she traveled out of the country for 30 days.  She was seen at the [place] immediate care on the sixth and diagnosed with COVID.  She is vaccinated.  She has URI symptoms, fevers, chills, body aches, fatigue, and headaches.  She is here because she noticed some blood in her sputum and has felt more short of breath than usual.  No chest pain.  No swelling to her lower extremities.  She is eating and drinking without vomiting or diarrhea.  She also is concerned because she has been having lower back pain and she has a history of polycystic kidney disease, so she wants to make sure she does not have a UTI. No dysuria, urgency, or frequency. No abd or flank pain.      Objective:   Past Medical History:    Anxiety    Back problem    Calculus of kidney    small stones, not causing pain    Depression    stress related     Esophageal reflux    Osteoarthritis    lower back pain    PKD (polycystic kidney disease)    is seeing Dr Chow at [place] for this    PONV (postoperative nausea and vomiting)    severe nausea and vomiting; experienced shakes/\"seizure like reactions    Renal disorder    cysts on both kidneys    S/P gastroplasty    for weight loss            Past Surgical History:   Procedure Laterality Date    Cholecystectomy  04/10/17    Dental surgery procedure      Forearm/wrist surgery unlisted Left     cyst removal    Lap sleeve gastrectomy  07/25/2016    Dr Ruano    Nail removal      Other Right 02/2018    right hip repair    Other  12/2017    skin removal bilateral arms and legs post lap sleeve gastrectomy    Other surgical history      ganglion cyst removed              Social History     Socioeconomic  History    Marital status:    Tobacco Use    Smoking status: Never    Smokeless tobacco: Never   Vaping Use    Vaping status: Never Used   Substance and Sexual Activity    Alcohol use: Yes     Alcohol/week: 0.0 standard drinks of alcohol     Comment: very little amount and rarely    Drug use: No    Sexual activity: Yes     Partners: Male   Other Topics Concern    Pt has a pacemaker No    Pt has a defibrillator No    Reaction to local anesthetic No     Social Determinants of Health      [facility]    Housing Stability            Review of Systems    Positive for stated complaint: [unfilled]   Other systems are as noted in HPI.  Constitutional and vital signs reviewed.      All other systems reviewed and negative except as noted above.    Physical Exam     ED Triage Vitals [07/10/24 1757]   /88   Pulse 78   Resp 18   Temp 97.9 °F (36.6 °C)   Temp src Oral   SpO2 100 %   O2 Device None (Room air)     Current:/88   Pulse 78   Temp 97.9 °F (36.6 °C) (Oral)   Resp 18   SpO2 100%     Physical Exam  Vitals and nursing note reviewed.   Constitutional:       General: She is not in acute distress.     Appearance: Normal appearance. She is not toxic-appearing.   HENT:      Head: Normocephalic.      Right Ear: Tympanic membrane normal.      Left Ear: Tympanic membrane normal.      Nose: Congestion present. No rhinorrhea.      Mouth/Throat:      Mouth: Mucous membranes are moist.      Pharynx: Posterior oropharyngeal erythema present. No oropharyngeal exudate.   Eyes:      Conjunctiva/sclera: Conjunctivae normal.      Pupils: Pupils are equal, round, and reactive to light.   Cardiovascular:      Rate and Rhythm: Normal rate and regular rhythm.   Pulmonary:      Effort: Pulmonary effort is normal.      Breath sounds: Normal breath sounds. No wheezing, rhonchi or rales.   Abdominal:      Palpations: Abdomen is soft.      Tenderness:  There is no abdominal tenderness. There is no right CVA tenderness or left CVA tenderness.   Musculoskeletal:         General: Normal range of motion.      Cervical back: Normal range of motion and neck supple.   Lymphadenopathy:      Cervical: No cervical adenopathy.   Skin:     General: Skin is warm and dry.      Capillary Refill: Capillary refill takes less than 2 seconds.      Findings: No rash.   Neurological:      General: No focal deficit present.      Mental Status: She is alert and oriented to person, place, and time.   Psychiatric:         Mood and Affect: Mood normal.         Behavior: Behavior normal.         ED Course   XR CHEST PA + LAT CHEST (CPT=71046)    Result Date: 7/10/2024  CONCLUSION:  Stable chest without radiographically evident acute intrathoracic process.    Dictated by (CST): Chip Monroe MD on 7/10/2024 at 6:46 PM     Finalized by (CST): Chip Monroe MD on 7/10/2024 at 6:47 PM         Labs Reviewed   [facility] POCT URINALYSIS DIPSTICK - Abnormal; Notable for the following components:       Result Value    Urine Clarity Cloudy (*)     Leukocyte esterase urine Small (*)     All other components within normal limits   D-DIMER (POC) - Normal   POCT PREGNANCY URINE - Normal   URINE CULTURE, ROUTINE       MDM     Medical Decision Making  The patient's vital signs are normal.  The d-dimer is negative. The CXR is negative.  The urine dip shows small leukocytes.  A urine culture is pending.  I will place the patient on Bactrim.  The patient has no point tenderness to the bilateral flanks, the lower back pain is most likely contributed to  body aches from the COVID.  She has Tessalon Perles, Sudafed, and albuterol inhaler, and nasal spray.  We discussed continuing supportive care and close follow-up with her primary care doctor.  She is aware if she develops any worsening or concerning symptoms, to go to the nearest emergency department for further evaluation.    Amount and/or Complexity of Data  Reviewed  Labs: ordered.     Details: D-dimer negative.  Urine dip shows small Leukocytes.  UCG negative.  Urine culture is pending.  Radiology: ordered.     Details: I visualized the CXR myself, there is no pneumonia or other acute process visible.   Discussion of management or test interpretation with external provider(s): I discussed this patient with Dr. Galicia.  He agrees with the plan of care.    Risk  OTC drugs.  Prescription drug management.  Risk Details: PE versus pneumonia versus COVID        Disposition and Plan     Clinical Impression:  1. Acute UTI    2. COVID         Disposition:  Discharge  7/10/2024  7:18 pm    Follow-up:  Mable Granados, PA  [address] [place] [id number]  [phone]    Schedule an appointment as soon as possible for a visit in 3 days            Medications Prescribed:  Discharge Medication List as of 7/10/2024  7:24 PM        START taking these medications    Details   sulfamethoxazole-trimethoprim -160 MG Oral Tab per tablet Take 1 tablet by mouth 2 (two) times daily for 7 days., Normal, Disp-14 tablet, R-0

## 2024-07-30 ENCOUNTER — MED REC SCAN ONLY (OUTPATIENT)
Dept: INTERNAL MEDICINE CLINIC | Facility: CLINIC | Age: 34
End: 2024-07-30

## 2024-10-11 ENCOUNTER — HOSPITAL ENCOUNTER (EMERGENCY)
Facility: HOSPITAL | Age: 34
Discharge: HOME | End: 2024-10-12
Attending: EMERGENCY MEDICINE
Payer: COMMERCIAL

## 2024-10-11 DIAGNOSIS — K52.9 GASTROENTERITIS: Primary | ICD-10-CM

## 2024-10-11 DIAGNOSIS — N30.00 ACUTE CYSTITIS WITHOUT HEMATURIA: ICD-10-CM

## 2024-10-11 LAB
BASOPHILS # BLD AUTO: 0.02 X10(3) UL (ref 0–0.2)
BASOPHILS NFR BLD AUTO: 0.3 %
DEPRECATED RDW RBC AUTO: 43.5 FL (ref 35.1–46.3)
EOSINOPHIL # BLD AUTO: 0.04 X10(3) UL (ref 0–0.7)
EOSINOPHIL NFR BLD AUTO: 0.6 %
ERYTHROCYTE [DISTWIDTH] IN BLOOD BY AUTOMATED COUNT: 13.1 % (ref 11–15)
HCT VFR BLD AUTO: 40.1 %
HGB BLD-MCNC: 13 G/DL
IMM GRANULOCYTES # BLD AUTO: 0.02 X10(3) UL (ref 0–1)
IMM GRANULOCYTES NFR BLD: 0.3 %
LYMPHOCYTES # BLD AUTO: 2.15 X10(3) UL (ref 1–4)
LYMPHOCYTES NFR BLD AUTO: 29.8 %
MCH RBC QN AUTO: 29.5 PG (ref 26–34)
MCHC RBC AUTO-ENTMCNC: 32.4 G/DL (ref 31–37)
MCV RBC AUTO: 90.9 FL
MONOCYTES # BLD AUTO: 0.42 X10(3) UL (ref 0.1–1)
MONOCYTES NFR BLD AUTO: 5.8 %
NEUTROPHILS # BLD AUTO: 4.57 X10 (3) UL (ref 1.5–7.7)
NEUTROPHILS # BLD AUTO: 4.57 X10(3) UL (ref 1.5–7.7)
NEUTROPHILS NFR BLD AUTO: 63.2 %
PLATELET # BLD AUTO: 278 10(3)UL (ref 150–450)
RBC # BLD AUTO: 4.41 X10(6)UL
WBC # BLD AUTO: 7.2 X10(3) UL (ref 4–11)

## 2024-10-11 PROCEDURE — 96374 THER/PROPH/DIAG INJ IV PUSH: CPT

## 2024-10-11 PROCEDURE — 85025 COMPLETE CBC W/AUTO DIFF WBC: CPT | Performed by: EMERGENCY MEDICINE

## 2024-10-11 PROCEDURE — 96361 HYDRATE IV INFUSION ADD-ON: CPT

## 2024-10-11 PROCEDURE — 99284 EMERGENCY DEPT VISIT MOD MDM: CPT

## 2024-10-11 PROCEDURE — 80048 BASIC METABOLIC PNL TOTAL CA: CPT | Performed by: EMERGENCY MEDICINE

## 2024-10-11 PROCEDURE — 83690 ASSAY OF LIPASE: CPT | Performed by: EMERGENCY MEDICINE

## 2024-10-11 PROCEDURE — 99285 EMERGENCY DEPT VISIT HI MDM: CPT

## 2024-10-11 PROCEDURE — 80076 HEPATIC FUNCTION PANEL: CPT | Performed by: EMERGENCY MEDICINE

## 2024-10-11 RX ORDER — ONDANSETRON 2 MG/ML
4 INJECTION INTRAMUSCULAR; INTRAVENOUS ONCE
Status: COMPLETED | OUTPATIENT
Start: 2024-10-11 | End: 2024-10-11

## 2024-10-12 ENCOUNTER — APPOINTMENT (OUTPATIENT)
Dept: CT IMAGING | Facility: HOSPITAL | Age: 34
End: 2024-10-12
Attending: EMERGENCY MEDICINE
Payer: COMMERCIAL

## 2024-10-12 VITALS
OXYGEN SATURATION: 97 % | BODY MASS INDEX: 41.02 KG/M2 | HEIGHT: 71 IN | WEIGHT: 293 LBS | SYSTOLIC BLOOD PRESSURE: 111 MMHG | TEMPERATURE: 97 F | RESPIRATION RATE: 16 BRPM | HEART RATE: 78 BPM | DIASTOLIC BLOOD PRESSURE: 69 MMHG

## 2024-10-12 LAB
ALBUMIN SERPL-MCNC: 4.5 G/DL (ref 3.2–4.8)
ALP LIVER SERPL-CCNC: 46 U/L
ALT SERPL-CCNC: 15 U/L
ANION GAP SERPL CALC-SCNC: 8 MMOL/L (ref 0–18)
AST SERPL-CCNC: 27 U/L (ref ?–34)
B-HCG UR QL: NEGATIVE
BILIRUB DIRECT SERPL-MCNC: 0.2 MG/DL (ref ?–0.3)
BILIRUB SERPL-MCNC: 0.7 MG/DL (ref 0.3–1.2)
BILIRUB UR QL: NEGATIVE
BUN BLD-MCNC: 15 MG/DL (ref 9–23)
BUN/CREAT SERPL: 14 (ref 10–20)
CALCIUM BLD-MCNC: 9.5 MG/DL (ref 8.7–10.4)
CHLORIDE SERPL-SCNC: 112 MMOL/L (ref 98–112)
CO2 SERPL-SCNC: 26 MMOL/L (ref 21–32)
COLOR UR: YELLOW
CREAT BLD-MCNC: 1.07 MG/DL
EGFRCR SERPLBLD CKD-EPI 2021: 70 ML/MIN/1.73M2 (ref 60–?)
GLUCOSE BLD-MCNC: 91 MG/DL (ref 70–99)
GLUCOSE UR-MCNC: NORMAL MG/DL
HGB UR QL STRIP.AUTO: NEGATIVE
KETONES UR-MCNC: NEGATIVE MG/DL
LEUKOCYTE ESTERASE UR QL STRIP.AUTO: 500
LIPASE SERPL-CCNC: 45 U/L (ref 12–53)
NITRITE UR QL STRIP.AUTO: NEGATIVE
OSMOLALITY SERPL CALC.SUM OF ELEC: 302 MOSM/KG (ref 275–295)
PH UR: 5.5 [PH] (ref 5–8)
POTASSIUM SERPL-SCNC: 3.6 MMOL/L (ref 3.5–5.1)
PROT SERPL-MCNC: 7 G/DL (ref 5.7–8.2)
PROT UR-MCNC: 30 MG/DL
SODIUM SERPL-SCNC: 146 MMOL/L (ref 136–145)
SP GR UR STRIP: 1.03 (ref 1–1.03)
UROBILINOGEN UR STRIP-ACNC: NORMAL

## 2024-10-12 PROCEDURE — 81025 URINE PREGNANCY TEST: CPT

## 2024-10-12 PROCEDURE — 74176 CT ABD & PELVIS W/O CONTRAST: CPT | Performed by: EMERGENCY MEDICINE

## 2024-10-12 PROCEDURE — 87086 URINE CULTURE/COLONY COUNT: CPT | Performed by: EMERGENCY MEDICINE

## 2024-10-12 PROCEDURE — 96375 TX/PRO/DX INJ NEW DRUG ADDON: CPT

## 2024-10-12 PROCEDURE — 81001 URINALYSIS AUTO W/SCOPE: CPT | Performed by: EMERGENCY MEDICINE

## 2024-10-12 RX ORDER — NITROFURANTOIN 25; 75 MG/1; MG/1
100 CAPSULE ORAL 2 TIMES DAILY
Qty: 14 CAPSULE | Refills: 0 | Status: SHIPPED | OUTPATIENT
Start: 2024-10-12 | End: 2024-10-19

## 2024-10-12 RX ORDER — ONDANSETRON 4 MG/1
4 TABLET, ORALLY DISINTEGRATING ORAL EVERY 8 HOURS PRN
Qty: 6 TABLET | Refills: 0 | Status: SHIPPED | OUTPATIENT
Start: 2024-10-12

## 2024-10-12 RX ORDER — NITROFURANTOIN 25; 75 MG/1; MG/1
100 CAPSULE ORAL ONCE
Status: COMPLETED | OUTPATIENT
Start: 2024-10-12 | End: 2024-10-12

## 2024-10-12 RX ORDER — FAMOTIDINE 20 MG/1
20 TABLET, FILM COATED ORAL DAILY
Qty: 7 TABLET | Refills: 0 | Status: SHIPPED | OUTPATIENT
Start: 2024-10-12 | End: 2024-10-19

## 2024-10-12 RX ORDER — MORPHINE SULFATE 4 MG/ML
4 INJECTION, SOLUTION INTRAMUSCULAR; INTRAVENOUS ONCE
Status: COMPLETED | OUTPATIENT
Start: 2024-10-12 | End: 2024-10-12

## 2024-10-12 RX ORDER — DROPERIDOL 2.5 MG/ML
5 INJECTION, SOLUTION INTRAMUSCULAR; INTRAVENOUS ONCE
Status: COMPLETED | OUTPATIENT
Start: 2024-10-12 | End: 2024-10-12

## 2024-10-12 NOTE — ED INITIAL ASSESSMENT (HPI)
34y F to ED via personal car with c/o vomiting. Patient reports she has been vomiting for 1-2 weeks. Patient says she has been unable to keep food or liquids down. Patient also reports frequent diarrhea. Now with right flank pain that has become unbearable. She is worried about her kidney health in these settings, as she has a history of polycystic kidney disease.

## 2024-10-12 NOTE — ED PROVIDER NOTES
Patient Seen in: [facility] Emergency Department      History     Chief Complaint   Patient presents with    Vomiting    Flank Pain     Stated Complaint: Vomiting    Subjective:   HPI      34-year-old female has been vomiting with diarrhea for few weeks.  Worse tonight in the right upper quadrant of the right flank.  History of kidney stones but never interventions.  History of gastric sleeve in 2016.  No fever.    Objective:     Past Medical History:    Anxiety    Back problem    Calculus of kidney    small stones, not causing pain    Depression    stress related     Esophageal reflux    Osteoarthritis    lower back pain    PKD (polycystic kidney disease)    is seeing Dr Chow at [facility] for this    PONV (postoperative nausea and vomiting)    severe nausea and vomiting; experienced shakes/\"seizure like reactions    Renal disorder    cysts on both kidneys    S/P gastroplasty    for weight loss              Past Surgical History:   Procedure Laterality Date    Cholecystectomy  04/10/17    Dental surgery procedure      Forearm/wrist surgery unlisted Left     cyst removal    Lap sleeve gastrectomy  07/25/2016    Dr Ruano    Nail removal      Other Right 02/2018    right hip repair    Other  12/2017    skin removal bilateral arms and legs post lap sleeve gastrectomy    Other surgical history      ganglion cyst removed                Social History     Socioeconomic History    Marital status:    Tobacco Use    Smoking status: Never    Smokeless tobacco: Never   Vaping Use    Vaping status: Never Used   Substance and Sexual Activity    Alcohol use: Yes     Alcohol/week: 0.0 standard drinks of alcohol     Comment: very little amount and rarely    Drug use: No    Sexual activity: Yes     Partners: Male   Other Topics Concern    Pt has a pacemaker No    Pt has a defibrillator No    Reaction to local anesthetic No     Social Drivers of Health      [facility], [facility]    Housing Stability                  Physical Exam     ED Triage Vitals [10/11/24 2048]   /78   Pulse 95   Resp 16   Temp 97.2 °F (36.2 °C)   Temp src Temporal   SpO2 96 %   O2 Device None (Room air)       Current Vitals:   Vital Signs  BP: 134/90  Pulse: 82  Resp: 20  Temp: 97.2 °F (36.2 °C)  Temp src: Temporal  MAP (mmHg): (!) 102    Oxygen Therapy  SpO2: 99 %  O2 Device: None (Room air)        Physical Exam  Constitutional: Oriented to person, place, and time.  Appears well-developed. No distress.  Tearful and crying.  Obese  Head: Normocephalic and atraumatic.   Eyes: Conjunctivae are normal. Pupils are equal, round, and reactive to light.   Cardiovascular: Normal rate, regular rhythm and intact distal pulses.    Pulmonary/Chest: Effort normal. No respiratory distress.   Abdominal: Soft.  tenderness most focally in the right upper quadrant right flank and epigastriUM.  No lower abdominal pain or guarding.  Musculoskeletal: Normal range of motion. No edema or tenderness.   Neurological: Alert and oriented to person, place, and time.   Skin: Skin is warm and dry.   Nursing note and vitals reviewed.    Differential diagnosis includes dyspepsia, gastritis, pancreatitis, biliary colic and cholecystitis, renal colic, UTI and obstructive uropathy.      ED Course     Labs Reviewed   BASIC METABOLIC PANEL (8) - Abnormal; Notable for the following components:       Result Value    Sodium 146 (*)     Creatinine 1.07 (*)     Calculated Osmolality 302 (*)     All other components within normal limits   URINALYSIS WITH CULTURE REFLEX - Abnormal; Notable for the following components:    Clarity Urine Turbid (*)     Protein Urine 30 (*)     Leukocyte Esterase Urine 500 (*)     WBC Urine 11-20 (*)     RBC Urine 6-10 (*)     Bacteria Urine Rare (*)     Squamous Epi. Cells Few (*)     All other components within normal limits   HEPATIC FUNCTION PANEL (7) - Normal   LIPASE - Normal   POCT PREGNANCY URINE -  Normal   CBC WITH DIFFERENTIAL WITH PLATELET   URINE CULTURE, ROUTINE              CT ABDOMEN AND PELVIS WITHOUT CONTRAST    COMPARISON: 7/20/2023.    IMPRESSION:    Postsurgical changes of sleeve gastrectomy again seen.  There is increased small hiatal hernia.    No other acute abnormality in the abdomen or pelvis.  No appendicitis.  No diverticulitis.  Surgically absent gallbladder.  No obstructive urolithiasis.  No SBO.    Numerous bilateral renal cysts again seen, including some hyperattenuating cysts.  IUD.  Redemonstrated bilateral L5 pars defects with grade 1 anterolisthesis of L5 on S1.    Report faxed to the ER and radiology departments and/or is made available via EMR at 10/12/2024 at 2:16 AM ET. Please do not hesitate to contact [facility] Radiology at the above number if there are specific questions regarding this study.    Dr. Margarette Hsu MD       [facility]              Medical Decision Making   imaging reassuring.  Fluids given.  Patient feeling a little better.  Taking p.o.  Encourage primary care follow-up and return.  Will give some symptomatic therapy at home for few days.    Problems Addressed:  Acute cystitis without hematuria: acute illness or injury with systemic symptoms  Gastroenteritis: acute illness or injury with systemic symptoms    Amount and/or Complexity of Data Reviewed  Labs: ordered. Decision-making details documented in ED Course.  Radiology: ordered and independent interpretation performed. Decision-making details documented in ED Course.     Details: By my review of the CT abdomen/pelvis there is no obvious evidence of bowel obstruction, free intraperitoneal air or significant free fluid.    Risk  OTC drugs.  Prescription drug management.  Decision regarding hospitalization.        Disposition and Plan     Clinical Impression:  1. Gastroenteritis    2. Acute cystitis without hematuria         Disposition:  Discharge  10/12/2024  1:56 am    Follow-up:  Mable Granados PA  [address]  [place]  [facility] [address]  [phone]    Call            Medications Prescribed:  Current Discharge Medication List        START taking these medications    Details   ondansetron 4 MG Oral Tablet Dispersible Take 1 tablet (4 mg total) by mouth every 8 (eight) hours as needed for Nausea.  Qty: 6 tablet, Refills: 0      famotidine (PEPCID) 20 MG Oral Tab Take 1 tablet (20 mg total) by mouth daily for 7 days.  Qty: 7 tablet, Refills: 0      nitrofurantoin monohydrate macro 100 MG Oral Cap Take 1 capsule (100 mg total) by mouth 2 (two) times daily for 7 days.  Qty: 14 capsule, Refills: 0                 Supplementary Documentation:

## 2024-12-10 ENCOUNTER — HOSPITAL ENCOUNTER (OUTPATIENT)
Age: 34
Discharge: HOME | End: 2024-12-10

## 2024-12-10 ENCOUNTER — APPOINTMENT (OUTPATIENT)
Dept: GENERAL RADIOLOGY | Age: 34
End: 2024-12-10
Attending: NURSE PRACTITIONER

## 2024-12-10 VITALS
RESPIRATION RATE: 18 BRPM | TEMPERATURE: 98 F | DIASTOLIC BLOOD PRESSURE: 84 MMHG | SYSTOLIC BLOOD PRESSURE: 115 MMHG | HEART RATE: 82 BPM | OXYGEN SATURATION: 100 %

## 2024-12-10 DIAGNOSIS — S93.401A MILD SPRAIN OF RIGHT ANKLE, INITIAL ENCOUNTER: ICD-10-CM

## 2024-12-10 DIAGNOSIS — S59.911A INJURY OF RIGHT FOREARM, INITIAL ENCOUNTER: ICD-10-CM

## 2024-12-10 DIAGNOSIS — S99.911A INJURY OF RIGHT ANKLE, INITIAL ENCOUNTER: Primary | ICD-10-CM

## 2024-12-10 DIAGNOSIS — S69.91XA INJURY OF RIGHT HAND, INITIAL ENCOUNTER: ICD-10-CM

## 2024-12-10 PROCEDURE — 73090 X-RAY EXAM OF FOREARM: CPT | Performed by: NURSE PRACTITIONER

## 2024-12-10 PROCEDURE — 99213 OFFICE O/P EST LOW 20 MIN: CPT | Performed by: NURSE PRACTITIONER

## 2024-12-10 PROCEDURE — A6448 LT COMPRES BAND <3"/YD: HCPCS | Performed by: NURSE PRACTITIONER

## 2024-12-10 PROCEDURE — 73610 X-RAY EXAM OF ANKLE: CPT | Performed by: NURSE PRACTITIONER

## 2024-12-10 PROCEDURE — 73130 X-RAY EXAM OF HAND: CPT | Performed by: NURSE PRACTITIONER
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
## 2024-12-23 ENCOUNTER — MED REC SCAN ONLY (OUTPATIENT)
Dept: INTERNAL MEDICINE CLINIC | Facility: CLINIC | Age: 34
End: 2024-12-23

## 2025-01-03 ENCOUNTER — TELEPHONE (OUTPATIENT)
Dept: SURGERY | Facility: CLINIC | Age: 35
End: 2025-01-03

## 2025-01-03 NOTE — TELEPHONE ENCOUNTER
Spoke with pt congratulating on 8 yr post op of VSG and offered friendly reminder to schedule annual visits with providers.  Per pt has been hospitalized for 8 days last year for recent dx of Bile acid malabsorption and also with a recent hip surgery.  Per pt is interested in scheduling when ready with bariatrician and dietitian.  Per pt has lost weight with hospitalization and is currently at a t wt of 277 lbs.  Pt offered may chart message/letter including phone number to call when ready to schedule follow up visits.  Pt thanked writer for call.

## 2025-03-20 ENCOUNTER — HOSPITAL ENCOUNTER (OUTPATIENT)
Age: 35
Discharge: HOME | End: 2025-03-20
Payer: COMMERCIAL

## 2025-03-20 VITALS
RESPIRATION RATE: 20 BRPM | OXYGEN SATURATION: 97 % | TEMPERATURE: 99 F | HEART RATE: 78 BPM | SYSTOLIC BLOOD PRESSURE: 134 MMHG | DIASTOLIC BLOOD PRESSURE: 83 MMHG

## 2025-03-20 DIAGNOSIS — J02.8 SORE THROAT (VIRAL): ICD-10-CM

## 2025-03-20 DIAGNOSIS — B97.89 SORE THROAT (VIRAL): ICD-10-CM

## 2025-03-20 DIAGNOSIS — S16.1XXA NECK MUSCLE STRAIN, INITIAL ENCOUNTER: ICD-10-CM

## 2025-03-20 DIAGNOSIS — Z20.822 LAB TEST NEGATIVE FOR COVID-19 VIRUS: ICD-10-CM

## 2025-03-20 DIAGNOSIS — Z20.822 ENCOUNTER FOR LABORATORY TESTING FOR COVID-19 VIRUS: Primary | ICD-10-CM

## 2025-03-20 LAB
S PYO AG THROAT QL: NEGATIVE
SARS-COV-2 RNA RESP QL NAA+PROBE: NOT DETECTED

## 2025-03-20 PROCEDURE — U0002 COVID-19 LAB TEST NON-CDC: HCPCS | Performed by: NURSE PRACTITIONER

## 2025-03-20 PROCEDURE — 87880 STREP A ASSAY W/OPTIC: CPT | Performed by: NURSE PRACTITIONER

## 2025-03-20 PROCEDURE — 99213 OFFICE O/P EST LOW 20 MIN: CPT | Performed by: NURSE PRACTITIONER

## 2025-03-20 NOTE — ED PROVIDER NOTES
Patient Seen in: [facility]      History     Chief Complaint   Patient presents with    Sore Throat    Neck Pain     Stated Complaint: Sore Throat    Subjective:   Well-appearing 34-year-old female with hypothyroidism, morbid obesity, hypertriglyceridemia, polycystic kidney disease, gastroesophageal reflux disease and binge eating presents with complaints of a sore throat, neck pain and fatigue for the past 2 days.  Patient communicates that there is pain to the left side of her neck with neck range of motion.  Patient denies fever or chills.  Patient denies difficulty swallowing or drooling.  Patient denies direct or blunt trauma to neck.  Patient denies headaches.  No over-the-counter medications have been taken for symptoms.              Objective:     Past Medical History:    Anxiety    Back problem    Calculus of kidney    small stones, not causing pain    Depression    stress related     Esophageal reflux    Osteoarthritis    lower back pain    PKD (polycystic kidney disease)    is seeing Dr Chow at [facility] for this    PONV (postoperative nausea and vomiting)    severe nausea and vomiting; experienced shakes/\"seizure like reactions    Renal disorder    cysts on both kidneys    S/P gastroplasty    for weight loss              Past Surgical History:   Procedure Laterality Date    Cholecystectomy  04/10/17    Dental surgery procedure      Forearm/wrist surgery unlisted Left     cyst removal    Lap sleeve gastrectomy  07/25/2016    Dr Ruano    Nail removal      Other Right 02/2018    right hip repair    Other  12/2017    skin removal bilateral arms and legs post lap sleeve gastrectomy    Other surgical history      ganglion cyst removed                Social History     Socioeconomic History    Marital status:    Tobacco Use    Smoking status: Never    Smokeless tobacco: Never   Vaping Use    Vaping status: Never Used   Substance and Sexual Activity    Alcohol use: Yes     Alcohol/week: 0.0  standard drinks of alcohol     Comment: very little amount and rarely    Drug use: No    Sexual activity: Yes     Partners: Male   Other Topics Concern    Pt has a pacemaker No    Pt has a defibrillator No    Reaction to local anesthetic No     Social Drivers of Health      Received from [facility], [facility]    Housing Stability              Review of Systems    Positive for stated complaint: Sore Throat  Other systems are as noted in HPI.  Constitutional and vital signs reviewed.      All other systems reviewed and negative except as noted above.    Physical Exam     ED Triage Vitals [03/20/25 1810]   /83   Pulse 78   Resp 20   Temp 98.6 °F (37 °C)   Temp src Oral   SpO2 97 %   O2 Device None (Room air)       Current Vitals:   Vital Signs  BP: 134/83  Pulse: 78  Resp: 20  Temp: 98.6 °F (37 °C)  Temp src: Oral    Oxygen Therapy  SpO2: 97 %  O2 Device: None (Room air)        Physical Exam  VS: Vital signs reviewed. 02 saturation within normal limits for this patient.    General: Patient is awake and alert, oriented to person, place and time. Pt appears non-toxic.     HEENT: Head is normocephalic, atraumatic. Nonicteric sclera, no conjunctival injection. No facial droop or slurred speech. No oral lesions or pallor. Mucous membranes moist.      Right Ear: Tympanic membrane, ear canal and external ear normal.      Left Ear: Tympanic membrane, ear canal and external ear normal.      Nose: No congestion or rhinorrhea.      Mouth/Throat:      Lips: Pink.      Mouth: Mucous membranes are moist.      Pharynx: Uvula midline. Posterior oropharyngeal erythema present. No pharyngeal swelling, oropharyngeal exudate or uvula swelling.      Tonsils: No tonsillar exudate or tonsillar abscesses.     Neck: No cervical lymphadenopathy. Supple. Normal ROM.    Lungs: Good inspiratory effort.  No accessory muscle use or tachypnea.    Extremities: No focal swelling or tenderness. Capillary  refill noted.   Skin: Warm, dry and normal in color.     Musculoskeletal:      Cervical back: Tenderness present. No swelling, edema, deformity, erythema, signs of trauma, rigidity, spasms, bony tenderness or crepitus. Pain with movement present. Decreased range of motion.  Neck pain is exacerbated with right rotation and lateral flexion.     Thoracic back: Normal.     Psychiatric: Normal affect, judgement normal, insight normal.     CNS: Moves all 4 extremities. Interacts appropriately. No gait abnormality. Memory normal.        ED Course     Labs Reviewed   POCT RAPID STREP - Normal   RAPID SARS-COV-2 BY PCR - Normal     MDM   Medical Decision Making  Well-appearing.  I discussed over-the-counter acetaminophen, throat lozenges, and warm water salt gargles for throat pain/discomfort.  Differential diagnosis considered included viral pharyngitis versus streptococcal sore throat versus peritonsillar abscess versus COVID-19    I discussed Tylenol as well as warm compresses to left paraspinal neck muscles.  Neck is supple.  Differential diagnosis considered included lymphadenopathy versus meningitis versus neck muscle strain.  I discussed signs and symptoms for prompt ED eval including worsening pain, fever, difficulty swallowing or drooling etc.    PMD follow-up.  Return precautions discussed.    Problems Addressed:  Encounter for laboratory testing for COVID-19 virus: acute illness or injury  Lab test negative for COVID-19 virus: acute illness or injury  Neck muscle strain, initial encounter: acute illness or injury  Sore throat (viral): acute illness or injury    Amount and/or Complexity of Data Reviewed  Labs: ordered. Decision-making details documented in ED Course.    Risk  OTC drugs.        Disposition and Plan     Clinical Impression:  1. Encounter for laboratory testing for COVID-19 virus    2. Lab test negative for COVID-19 virus    3. Sore throat (viral)    4. Neck muscle strain, initial encounter          Disposition:  Discharge  3/20/2025  6:55 pm    Follow-up:  Beulah Lundy MD  [address] [place] [id number] [phone]    In 1 week  As needed          Medications Prescribed:  Discharge Medication List as of 3/20/2025  6:57 PM              Supplementary Documentation:

## 2025-03-20 NOTE — ED INITIAL ASSESSMENT (HPI)
Pt here with complaints of sore throat , neck swelling and fatigue that began 2 days ago , pt states she is unable to move her neck because its very painful, pt denies any fevers or sob

## 2025-05-14 ENCOUNTER — HOSPITAL ENCOUNTER (OUTPATIENT)
Age: 35
Discharge: HOME | End: 2025-05-14
Payer: COMMERCIAL

## 2025-05-14 VITALS
OXYGEN SATURATION: 98 % | SYSTOLIC BLOOD PRESSURE: 116 MMHG | DIASTOLIC BLOOD PRESSURE: 76 MMHG | HEART RATE: 84 BPM | RESPIRATION RATE: 16 BRPM | TEMPERATURE: 99 F

## 2025-05-14 DIAGNOSIS — E86.0 DEHYDRATION: ICD-10-CM

## 2025-05-14 DIAGNOSIS — R19.7 DIARRHEA, UNSPECIFIED TYPE: Primary | ICD-10-CM

## 2025-05-14 LAB
#MXD IC: 0.2 X10ˆ3/UL (ref 0.1–1)
BUN BLD-MCNC: 27 MG/DL (ref 7–18)
CHLORIDE BLD-SCNC: 108 MMOL/L (ref 98–112)
CO2 BLD-SCNC: 27 MMOL/L (ref 21–32)
CREAT BLD-MCNC: 1.3 MG/DL (ref 0.55–1.02)
EGFRCR SERPLBLD CKD-EPI 2021: 56 ML/MIN/1.73M2 (ref 60–?)
GLUCOSE BLD-MCNC: 120 MG/DL (ref 70–99)
HCT VFR BLD AUTO: 36.5 % (ref 35–48)
HCT VFR BLD CALC: 38 % (ref 34–50)
HGB BLD-MCNC: 11.9 G/DL (ref 12–16)
ISTAT IONIZED CALCIUM FOR CHEM 8: 1.21 MMOL/L (ref 1.12–1.32)
LYMPHOCYTES # BLD AUTO: 1.7 X10ˆ3/UL (ref 1–4)
LYMPHOCYTES NFR BLD AUTO: 22.9 %
MCH RBC QN AUTO: 30.1 PG (ref 26–34)
MCHC RBC AUTO-ENTMCNC: 32.6 G/DL (ref 31–37)
MCV RBC AUTO: 92.2 FL (ref 80–100)
MIXED CELL %: 3 %
NEUTROPHILS # BLD AUTO: 5.5 X10ˆ3/UL (ref 1.5–7.7)
NEUTROPHILS NFR BLD AUTO: 74.1 %
PLATELET # BLD AUTO: 251 X10ˆ3/UL (ref 150–450)
POCT INFLUENZA A: NEGATIVE
POCT INFLUENZA B: NEGATIVE
POTASSIUM BLD-SCNC: 4.2 MMOL/L (ref 3.6–5.1)
RBC # BLD AUTO: 3.96 X10ˆ6/UL (ref 3.8–5.3)
SODIUM BLD-SCNC: 141 MMOL/L (ref 136–145)
WBC # BLD AUTO: 7.4 X10ˆ3/UL (ref 4–11)

## 2025-05-14 PROCEDURE — 87502 INFLUENZA DNA AMP PROBE: CPT | Performed by: NURSE PRACTITIONER

## 2025-05-14 PROCEDURE — 85025 COMPLETE CBC W/AUTO DIFF WBC: CPT | Performed by: NURSE PRACTITIONER

## 2025-05-14 PROCEDURE — 96361 HYDRATE IV INFUSION ADD-ON: CPT | Performed by: NURSE PRACTITIONER

## 2025-05-14 PROCEDURE — 80047 BASIC METABLC PNL IONIZED CA: CPT | Performed by: NURSE PRACTITIONER

## 2025-05-14 PROCEDURE — 96374 THER/PROPH/DIAG INJ IV PUSH: CPT | Performed by: NURSE PRACTITIONER

## 2025-05-14 PROCEDURE — 99214 OFFICE O/P EST MOD 30 MIN: CPT | Performed by: NURSE PRACTITIONER

## 2025-05-14 RX ORDER — ONDANSETRON 2 MG/ML
4 INJECTION INTRAMUSCULAR; INTRAVENOUS ONCE
Status: COMPLETED | OUTPATIENT
Start: 2025-05-14 | End: 2025-05-14

## 2025-05-14 RX ORDER — SODIUM CHLORIDE 9 MG/ML
1000 INJECTION, SOLUTION INTRAVENOUS ONCE
Status: COMPLETED | OUTPATIENT
Start: 2025-05-14 | End: 2025-05-14

## 2025-05-14 NOTE — ED PROVIDER NOTES
Patient Seen in: [facility]      History     Chief Complaint   Patient presents with    Diarrhea    Nausea     Stated Complaint: ABDOMINAL PAIN, DIARRHEA, dehydrated    Subjective:   34-year-old female with medical conditions as noted below presents with complaints of nausea, watery diarrhea, generalized fatigue, generalized bodyaches x 3 to 4 days.  Attempted to eat a turkey ham sandwich today and had diarrhea.  Had to leave work early.  No fever, blood in stool, urinary symptoms.  States  had recent stomach bug with vomiting, which has improved for him.  Patient states has polycystic kidney disease and is concerned for dehydration      History of Present Illness                  Objective:     Past Medical History:    Anxiety    Back problem    Calculus of kidney    small stones, not causing pain    Depression    stress related     Esophageal reflux    Osteoarthritis    lower back pain    PKD (polycystic kidney disease)    is seeing Dr Chow at [place] for this    PONV (postoperative nausea and vomiting)    severe nausea and vomiting; experienced shakes/\"seizure like reactions    Renal disorder    cysts on both kidneys    S/P gastroplasty    for weight loss              Past Surgical History:   Procedure Laterality Date    Cholecystectomy  04/10/17    Dental surgery procedure      Forearm/wrist surgery unlisted Left     cyst removal    Lap sleeve gastrectomy  07/25/2016    Dr Ruano    Nail removal      Other Right 02/2018    right hip repair    Other  12/2017    skin removal bilateral arms and legs post lap sleeve gastrectomy    Other surgical history      ganglion cyst removed                Social History     Socioeconomic History    Marital status:    Tobacco Use    Smoking status: Never    Smokeless tobacco: Never   Vaping Use    Vaping status: Never Used   Substance and Sexual Activity    Alcohol use: Yes     Alcohol/week: 0.0 standard drinks of alcohol     Comment: very little  amount and rarely    Drug use: No    Sexual activity: Yes     Partners: Male   Other Topics Concern    Pt has a pacemaker No    Pt has a defibrillator No    Reaction to local anesthetic No     Social Drivers of Health      Received from [facility]    Housing Stability              Review of Systems   Constitutional:  Positive for fatigue. Negative for chills and fever.   HENT:  Negative for congestion, rhinorrhea and sore throat.    Gastrointestinal:  Positive for diarrhea and nausea. Negative for abdominal pain and vomiting.   Musculoskeletal:  Positive for myalgias.   Neurological:  Negative for headaches.   All other systems reviewed and are negative.      Positive for stated complaint: ABDOMINAL PAIN, DIARRHEA, dehydrated  Other systems are as noted in HPI.  Constitutional and vital signs reviewed.      All other systems reviewed and negative except as noted above.                  Physical Exam     ED Triage Vitals [05/14/25 1417]   /76   Pulse 84   Resp 16   Temp 98.5 °F (36.9 °C)   Temp src Oral   SpO2 98 %   O2 Device None (Room air)       Current Vitals:   Vital Signs  BP: 116/76  Pulse: 84  Resp: 16  Temp: 98.5 °F (36.9 °C)  Temp src: Oral    Oxygen Therapy  SpO2: 98 %  O2 Device: None (Room air)          Physical Exam  Vitals and nursing note reviewed.   Constitutional:       General: She is not in acute distress.     Appearance: Normal appearance. She is not ill-appearing or toxic-appearing.   HENT:      Head: Normocephalic.      Right Ear: Tympanic membrane and external ear normal.      Left Ear: Tympanic membrane and external ear normal.      Nose: Nose normal.      Mouth/Throat:      Mouth: Mucous membranes are moist.      Pharynx: Oropharynx is clear. Uvula midline.      Tonsils: No tonsillar exudate.   Cardiovascular:      Rate and Rhythm: Normal rate and regular rhythm.   Pulmonary:      Effort: Pulmonary effort is normal.      Breath sounds: Normal breath sounds.    Abdominal:      General: Bowel sounds are normal.      Palpations: Abdomen is soft.      Tenderness: There is abdominal tenderness in the right upper quadrant.      Comments: Mild RUQ tenderness   Musculoskeletal:         General: Normal range of motion.      Cervical back: Normal range of motion and neck supple.   Skin:     General: Skin is warm.      Capillary Refill: Capillary refill takes less than 2 seconds.   Neurological:      Mental Status: She is alert and oriented to person, place, and time.   Psychiatric:         Behavior: Behavior is cooperative.                   ED Course     Labs Reviewed   POCT CBC - Abnormal; Notable for the following components:       Result Value    HGB IC 11.9 (*)     All other components within normal limits   POCT ISTAT CHEM8 CARTRIDGE - Abnormal; Notable for the following components:    ISTAT BUN 27 (*)     ISTAT Glucose 120 (*)     ISTAT Creatinine 1.30 (*)     eGFR-Cr 56 (*)     All other components within normal limits   POCT FLU TEST - Normal    Narrative:     This assay is a rapid molecular in vitro test utilizing nucleic acid amplification of influenza A and B viral RNA.          Results                                MDM              Medical Decision Making  Patient is non ill/toxic appearing   I discussed differentials with patient including but not limited to viral illness, viral gastroenteritis, dehydration  Rapid Influenza negative  Will check basic labs, give IV fluids and iv zofran   Bun 27, creatinine 1.3, GFR 56, glucose 120  Patient states feeling better after fluids.  Discussed with patient to hydrate for the next few days.  Started on liquid diet, advance to brat diet and then advance to bland diet as tolerated.  Good handwashing  Fu with PCP in a few days to have labs repeated for kidney function. Strict ED precautions discussed  Discussed with Dr. Bardales, IC attending      Problems Addressed:  Dehydration: acute illness or injury  Diarrhea, unspecified  type: acute illness or injury    Amount and/or Complexity of Data Reviewed  Labs: ordered. Decision-making details documented in ED Course.          Disposition and Plan     Clinical Impression:  1. Diarrhea, unspecified type    2. Dehydration         Disposition:  Discharge  5/14/2025  3:44 pm    Follow-up:  Beulah Lundy MD  [address] [facility] [id number] [phone]    Schedule an appointment as soon as possible for a visit       [facility] Emergency Department  [address] [facility] [id number] [phone]    If symptoms worsen          Medications Prescribed:  Discharge Medication List as of 5/14/2025  3:42 PM                Supplementary Documentation:

## 2025-05-14 NOTE — ED INITIAL ASSESSMENT (HPI)
Pt presents with diarrhea with nausea x 3-4 days. Stool is liquid. Pt reports diarrhea comes soon after solid food intake. Liquids don't trigger diarrhea per pt. Fatigued and achy.     Pt reports family member had \"recent stomach bug with vomiting\"

## 2025-06-18 ENCOUNTER — APPOINTMENT (OUTPATIENT)
Dept: GENERAL RADIOLOGY | Age: 35
End: 2025-06-18
Attending: EMERGENCY MEDICINE
Payer: COMMERCIAL

## 2025-06-18 ENCOUNTER — HOSPITAL ENCOUNTER (OUTPATIENT)
Age: 35
Discharge: HOME | End: 2025-06-18
Payer: COMMERCIAL

## 2025-06-18 VITALS
WEIGHT: 286 LBS | TEMPERATURE: 98 F | BODY MASS INDEX: 40.04 KG/M2 | HEART RATE: 86 BPM | OXYGEN SATURATION: 100 % | SYSTOLIC BLOOD PRESSURE: 113 MMHG | HEIGHT: 71 IN | DIASTOLIC BLOOD PRESSURE: 84 MMHG | RESPIRATION RATE: 18 BRPM

## 2025-06-18 DIAGNOSIS — S63.501A RIGHT WRIST SPRAIN, INITIAL ENCOUNTER: Primary | ICD-10-CM

## 2025-06-18 DIAGNOSIS — M25.539 WRIST PAIN: ICD-10-CM

## 2025-06-18 PROCEDURE — L3924 HFO WITHOUT JOINTS PRE OTS: HCPCS | Performed by: EMERGENCY MEDICINE

## 2025-06-18 PROCEDURE — 73110 X-RAY EXAM OF WRIST: CPT | Performed by: EMERGENCY MEDICINE

## 2025-06-18 PROCEDURE — 99214 OFFICE O/P EST MOD 30 MIN: CPT | Performed by: EMERGENCY MEDICINE

## 2025-06-18 RX ORDER — AMITRIPTYLINE HYDROCHLORIDE 50 MG/1
50 TABLET ORAL NIGHTLY
COMMUNITY
Start: 2025-05-30

## 2025-06-18 RX ORDER — GABAPENTIN 100 MG/1
100 CAPSULE ORAL
COMMUNITY
Start: 2025-05-29

## 2025-06-18 RX ORDER — CYCLOBENZAPRINE HCL 5 MG
TABLET ORAL
Qty: 30 TABLET | Refills: 0 | Status: SHIPPED | OUTPATIENT
Start: 2025-06-18

## 2025-06-18 NOTE — ED PROVIDER NOTES
Patient Seen in: [facility]        History  Chief Complaint   Patient presents with    Wrist Pain     Stated Complaint: R Wrist Pain    Subjective:   HPI          Fabi Lees is a 35 year old female here for right wrist pain after a fall 1 day ago.  Moving, and pressing the area makes symptoms worse.  Noticed swelling to the area compared to left wrist.  No open wounds, in no acute distress.         Objective:     Past Medical History:    Anxiety    Back problem    Calculus of kidney    small stones, not causing pain    Depression    stress related     Esophageal reflux    Osteoarthritis    lower back pain    PKD (polycystic kidney disease)    is seeing Dr Chow at [place] for this    PONV (postoperative nausea and vomiting)    severe nausea and vomiting; experienced shakes/\"seizure like reactions    Renal disorder    cysts on both kidneys    S/P gastroplasty    for weight loss              Past Surgical History:   Procedure Laterality Date    Cholecystectomy  04/10/17    Dental surgery procedure      Forearm/wrist surgery unlisted Left     cyst removal    Lap sleeve gastrectomy  07/25/2016    Dr Ruano    Nail removal      Other Right 02/2018    right hip repair    Other  12/2017    skin removal bilateral arms and legs post lap sleeve gastrectomy    Other surgical history      ganglion cyst removed                Social History     Socioeconomic History    Marital status:    Tobacco Use    Smoking status: Never    Smokeless tobacco: Never   Vaping Use    Vaping status: Never Used   Substance and Sexual Activity    Alcohol use: Yes     Alcohol/week: 0.0 standard drinks of alcohol     Comment: very little amount and rarely    Drug use: No    Sexual activity: Yes     Partners: Male   Other Topics Concern    Pt has a pacemaker No    Pt has a defibrillator No    Reaction to local anesthetic No     Social Drivers of Health      Received from [facility]    Housing Stability               Review of Systems    Positive for stated complaint: R Wrist Pain  Other systems are as noted in HPI.  Constitutional and vital signs reviewed.      All other systems reviewed and negative except as noted above.                  Physical Exam    ED Triage Vitals [06/18/25 1347]   /84   Pulse 86   Resp 18   Temp 98 °F (36.7 °C)   Temp src Oral   SpO2 100 %   O2 Device None (Room air)       Current Vitals:   Vital Signs  BP: 113/84  Pulse: 86  Resp: 18  Temp: 98 °F (36.7 °C)  Temp src: Oral    Oxygen Therapy  SpO2: 100 %  O2 Device: None (Room air)            Physical Exam  Vitals and nursing note reviewed.   Constitutional:       Appearance: Normal appearance.   HENT:      Head: Normocephalic.   Cardiovascular:      Rate and Rhythm: Normal rate.   Musculoskeletal:      Comments: Decreased range of motion with flexion, extension to right wrist.  Tenderness over radial head, and ulnar styloid.  Mild swelling noted compared to left wrist.  Surrounding compartments soft with good radial, and ulnar pulses.   Skin:     Capillary Refill: Capillary refill takes less than 2 seconds.      Findings: No bruising.   Neurological:      General: No focal deficit present.      Mental Status: She is alert and oriented to person, place, and time.   Psychiatric:         Mood and Affect: Mood normal.         Behavior: Behavior normal.         Thought Content: Thought content normal.         Judgment: Judgment normal.                 ED Course  Labs Reviewed - No data to display                         MDM            Medical Decision Making  Fx vs Sprain vs other causes of sx. No open wounds.     Tx for right wrist sprain with Velcro wrist splint without spica.  Primary care follow-up, or Ortho if symptoms persist past a week.  Tylenol, rest, ice elevate.   No e/o compartment syndrome, arterial injury, or nerve injury at this time.  NVI, and good pulses.     Medication: declined  Education: proper ergonomics, safety,  protection, and re-injury prevention.  Modified activity discussed.    I Updated patient  on all findings, who verbalized understanding and agreement with the plan. They are aware to go to the ER for new or worsening sx. PCP f/u as discussed.  All questions answered. No acute distress and cleared for home.      Problems Addressed:  Right wrist sprain, initial encounter: acute illness or injury  Wrist pain: acute illness or injury    Amount and/or Complexity of Data Reviewed  Radiology: ordered and independent interpretation performed. Decision-making details documented in ED Course.     Details: After independent interpretation I agree with radiologist No acute findings    Risk  OTC drugs.  Prescription drug management.        Disposition and Plan     Clinical Impression:  1. Right wrist sprain, initial encounter    2. Wrist pain         Disposition:  Discharge  6/18/2025  2:41 pm    Follow-up:  Beulah Lundy MD  [address] [place] [address]  [phone]          Ortho (Bone) doctor  To schedule an appointment with the Orthopedic and Sports Medicine department; please text or call [phone] and choose option 3 when prompted.  Call             Medications Prescribed:  Discharge Medication List as of 6/18/2025  2:44 PM        START taking these medications    Details   cyclobenzaprine 5 MG Oral Tab 1 to 2 tablets every 8 hours as needed for muscle spasm, Normal, Disp-30 tablet, R-0                   Supplementary Documentation:

## 2025-06-18 NOTE — DISCHARGE INSTRUCTIONS
There is no broken bone on x-ray, but there is concern for a ligament injury.   Keep splint on during your waking hours.  Do not shower, or sleep with the splint.  It can cause increased swelling to the hand if you sleep with it.    Continue taking Tylenol, and Flexeril sent to the pharmacy.  This is a muscle relaxer that may cause drowsiness.  I sent a low dose to the pharmacy.  Take every 8 hours as needed    To schedule an appointment with the Orthopedic and Sports Medicine department; please text or call [phone] and choose option 3 when prompted. If your insurance requires a referral, please contact your primary care provider first.

## (undated) DEVICE — TROCAR: Brand: KII FIOS FIRST ENTRY

## (undated) DEVICE — SOL  .9 1000ML BTL

## (undated) DEVICE — STERILE LATEX POWDER-FREE SURGICAL GLOVESWITH NITRILE COATING: Brand: PROTEXIS

## (undated) DEVICE — LAP CHOLE: Brand: MEDLINE INDUSTRIES, INC.

## (undated) DEVICE — UNDYED BRAIDED (POLYGLACTIN 910), SYNTHETIC ABSORBABLE SUTURE: Brand: COATED VICRYL

## (undated) DEVICE — Device: Brand: DEFENDO AIR/WATER/SUCTION AND BIOPSY VALVE

## (undated) DEVICE — APPLIER CLIP ENDO 10MM

## (undated) DEVICE — SOL  .9 3000ML

## (undated) DEVICE — INSUFFLATION TUBING SET, WITH FILTER

## (undated) DEVICE — Device: Brand: BALLOON3

## (undated) DEVICE — ENDOSCOPY PACK UPPER: Brand: MEDLINE INDUSTRIES, INC.

## (undated) DEVICE — 3M™ RED DOT™ MONITORING ELECTRODE WITH FOAM TAPE AND STICKY GEL, 50/BAG, 20/CASE, 72/PLT 2570: Brand: RED DOT™

## (undated) DEVICE — FORCEP RADIAL JAW 4

## (undated) DEVICE — Device

## (undated) DEVICE — FILTERLINE NASAL ADULT O2/CO2

## (undated) DEVICE — DERMABOND LIQUID ADHESIVE

## (undated) DEVICE — 1200CC GUARDIAN II: Brand: GUARDIAN

## (undated) DEVICE — TISSUE RETRIEVAL SYSTEM: Brand: INZII RETRIEVAL SYSTEM

## (undated) DEVICE — TROCAR: Brand: KII® SLEEVE

## (undated) DEVICE — CANNULA NASAL 02/C02 ADULT

## (undated) NOTE — LETTER
2017    Patient: Se Sun  : 6/15/1990 Visit date: 2017    To whom it may concern.,    Janie Cassidy is a pleasant  33year old who is planning to undergo skin removal surgery.  I highly recommend this due to increased medical issues/h

## (undated) NOTE — ED AVS SNAPSHOT
Branden John   MRN: [id number]    Department:  [facility] Emergency Department   Date of Visit:  5/21/2019           Disclosure     Insurance plans vary and the physician(s) referred by the ER may not be covered by your plan.  Please conta CARE PHYSICIAN AT ONCE OR RETURN IMMEDIATELY TO THE EMERGENCY DEPARTMENT. If you have been prescribed any medication(s), please fill your prescription right away and begin taking the medication(s) as directed.   If you believe that any of the medications

## (undated) NOTE — ED AVS SNAPSHOT
[facility] Emergency Department    [address]     [address] [place] [address]    Phone:  [phone]    Fax:  [phone]           Kade November   MRN: [id number]    Department:  [facility] Emergency Department   Date of Visit:  1 01/09/2017  01:25 famoTIDine (PEPCID) injection 20 mg 20 mg                Admin Date Administration Dose                   01/09/2017  01:56 iopamidol (ISOVUE-M) 76 % injection 100 mL 80 mL                     Medication Information       Follow the dire to a primary care or a specialist physician for a follow-up visit, please tell this physician (or your personal doctor if your instructions are to return to your personal doctor) about any new or lasting problems.  The primary care or specialist physician m [facility] [address] [facility],[address] [facility] ([address] [place] [address]) [phone]   T[address] Franco [address] [employer] ([facility]) [address] [facility] [address] [address]  ([place] [address]) [phone]                Additional Information

## (undated) NOTE — MR AVS SNAPSHOT
[facility] [address]                Thank you for choosing us for your health care visit with Malka Chapa MD.  We are glad to serve you and happy to provide you with this summary of you Fluticasone Propionate 50 MCG/ACT Susp   2 sprays by Each Nare route daily. Commonly known as:  FLONASE           hydrochlorothiazide 25 MG Tabs   TAKE 1 TABLET (25 MG TOTAL) BY MOUTH DAILY.    Commonly known as:  HYDRODIURIL           Montelukast Sodium

## (undated) NOTE — ED AVS SNAPSHOT
[facility] Emergency Department    [address]     [address] [place] [address]    Phone:  [phone]    Fax:  [phone]           Carli Ferro   MRN: [id number]    Department:  [facility] Emergency Department   Date of Visit:  1 and Class Registration line at [phone] or find a doctor online by visiting [email or web address].    IF THERE IS ANY CHANGE OR WORSENING OF YOUR CONDITION, CALL YOUR PRIMARY CARE PHYSICIAN AT ONCE OR RETURN IMMEDIATELY TO [address] [place] [address].     If

## (undated) NOTE — MR AVS SNAPSHOT
[address] at [address] [facility] [address]  [phone]               Thank you for choosing us for your health care visit with Kesha Villasenor DO.   We are glad to serve you and happy to provide you with this day taking this medication, and follow the directions you see here. Commonly known as:  ZYRTEC           Fluticasone Propionate 50 MCG/ACT Susp   2 sprays by Each Nare route daily.    Commonly known as:  FLONASE           hydrochlorothiazide 25 MG Tabs   TAKE office, you can view your past visit information in PPS by going to Visits < Visit Summaries. [employer] questions? Call [phone] for help. PPS is NOT to be used for urgent needs. For medical emergencies, dial 911.            Visit EDWARD-EL

## (undated) NOTE — ED AVS SNAPSHOT
Mervin Blizzard   MRN: [id number]    Department:  [facility] Emergency Department   Date of Visit:  11/25/2019           Disclosure     Insurance plans vary and the physician(s) referred by the ER may not be covered by your plan.  Please contact tell this physician (or your personal doctor if your instructions are to return to your personal doctor) about any new or lasting problems. The primary care or specialist physician will see patients referred from the [facility] Emergency Department.  Geovanny Sethi

## (undated) NOTE — LETTER
Date: 7/22/2019    Patient Name: Elliott Adhikari          To Whom it may concern: This letter has been written at the patient's request. The above patient was seen at the [facility] for treatment of a medical condition.     This patient

## (undated) NOTE — IP AVS SNAPSHOT
[facility]    [address], [place], [place] ~ [phone]                Discharge Summary   3/9/2017    [facility]           Admission Information        Provider Department    3/9/2017 Tamica Dixon MD [facility] Tiff Bachelor - Do not drive today. - Do not operate any machinery today (including kitchen equipment).     Gastroscopy:  - You may have a sore throat for 2-3 days following the exam. This is normal. Gargling with warm salt water (1/2 tsp salt to 1 glass warm water) or (01/09/17)  9.1 (01/09/17)  4.64 (01/09/17)  13.9 (01/09/17)  41.1 (01/09/17)  88.5 -- -- -- (01/09/17)  198 (01/09/17)  10.5 (H)    (12/20/16)  7.2 (12/20/16)  4.61 (12/20/16)  13.9 (12/20/16)  40.5 (12/20/16)  87.8    (12/20/16)  192 (12/20/16)  11.1 (H can help with your Affordable Care Act coverage, as well as all types of Medicaid plans. To get signed up and covered, please call [phone] and ask to get set up for an insurance coverage that is in-network with Inez Ferraro.         Visit

## (undated) NOTE — ED AVS SNAPSHOT
Porsche Boone   MRN: [id number]    Department:  [facility] Emergency Department   Date of Visit:  10/26/2017           Disclosure     Insurance plans vary and the physician(s) referred by the ER may not be covered by your plan.  Please cont CARE PHYSICIAN AT ONCE OR RETURN IMMEDIATELY TO THE EMERGENCY DEPARTMENT. If you have been prescribed any medication(s), please fill your prescription right away and begin taking the medication(s) as directed.   If you believe that any of the medications

## (undated) NOTE — ED AVS SNAPSHOT
[facility] Emergency Department    [address]     [place] [address]    Phone:  [phone]    Fax:  [phone]           Get Chauhan   MRN: [id number]    Department:  [facility] Emergency Department   Date of Visit:  4/1/ Admin Date Administration Dose                   04/01/2017  17:25 levofloxacin in D5W (LEVAQUIN) IVPB premix 500 mg 500 mg                     Medication Information       Follow the directions for taking your medications provided by your doctor. our [address] at [phone]. Your Emergency Department team is here to serve you. You are our top priority. You were examined and treated today on an urgent basis only. This was not a substitute for ongoing medical care.  Often, one Emergency Dep pertaining to these instructions have been answered in a satisfactory manner. 24-Hour Pharmacies        Pharmacy Address Phone Number   Luis[address] Franco [address] [facility] ([address]) [address] [facility] ([address] [place] [address] office, you can view your past visit information in [employer] by going to Visits < Visit Summaries. [employer] questions? Call [phone] for help. [employer] is NOT to be used for urgent needs. For medical emergencies, dial 911.

## (undated) NOTE — ED AVS SNAPSHOT
[facility] Emergency Department    [address] [place] [address]    Phone:  [phone]    Fax:  [phone]           Nathanael Benavides   MRN: [id number]    Department:  [facility] Emergency Department   Date of Visit:  1 and Class Registration line at [phone] or find a doctor online by visiting [email or web address].    IF THERE IS ANY CHANGE OR WORSENING OF YOUR CONDITION, CALL YOUR PRIMARY CARE PHYSICIAN AT ONCE OR RETURN IMMEDIATELY TO [address] [place] [address].     If

## (undated) NOTE — MR AVS SNAPSHOT
[address] at [address] [address] [phone]               Thank you for choosing us for your health care visit with Sisi Chambers DO.   We are glad to serve you and happy to provide you with this day Fluticasone Propionate 50 MCG/ACT Susp   2 sprays by Each Nare route daily. Commonly known as:  FLONASE           hydrochlorothiazide 25 MG Tabs   TAKE 1 TABLET (25 MG TOTAL) BY MOUTH DAILY.    Commonly known as:  HYDRODIURIL           Montelukast Sodium

## (undated) NOTE — MR AVS SNAPSHOT
[address] [facility]  [address] [phone]               Thank you for choosing us for your health care visit with Heather Oliva MD.  We are glad to serve you and happy to provide you with Commonly known as:  ATIVAN           MULTIVITAMIN OR   Take by mouth.            Pantoprazole Sodium 40 MG Tbec   Take 40 mg by mouth every morning before breakfast.   Commonly known as:  PROTONIX           PROBIOTIC DAILY OR   Take by mouth.           sucr

## (undated) NOTE — LETTER
Date & Time: 5/14/2025, 3:32 PM  Patient: Fabi Lees  Encounter Provider(s):    Holly Perry APRN       To Whom It May Concern:    Fabi Lees was seen and treated in our department on 5/14/2025. She can return to work with these limitations: Please allow Fabi to work from home until 05/19/2025.    If you have any questions or concerns, please do not hesitate to call.        _____________________________  OLEG Hernandes

## (undated) NOTE — IP AVS SNAPSHOT
[facility]    [address], [place], [place] ~ [phone]                Discharge Summary   4/10/2017    Lynford Cheadle           Admission Information        Provider Department    4/10/2017 Danielle Arredondo MD [facility] Pre-O · Avoid mowing the lawn, playing sports, or working with power tools/applicances (power saws, electric knives or mixers)   · That you have someone stay with you on your first night home   · Do not drink alcohol or take sleeping pills or tranquilizers   · D [facility] \"National Surgical quality Improvement Program\" (NSQIP). Questionnaires will be mailed to randomly selected surgery patients 30 days after their surgery.       If you receive a questionnaire about your surgery, please take a associated with nausea and/or vomiting, fever, chills, or excessive drainage or pain at the incision sites. If this occurs please contact your surgeon immediately.   9. It is not unusual for you to experience pain similar to the “gallbladder attacks” that y Metabolic Lab Results  (Last result in the past 90 days)    HgbA1C Glucose BUN Creatinine Calcium Alkaline Phosph AST    -- (04/01/17)  95 (04/01/17)  15 (04/01/17)  0.89 (04/01/17)  9.4 (04/01/17)  37 (04/01/17)  20      Metabolic Lab Results  (Last resul Summaries. If you've been to the Emergency Department or your doctor's office, you can view your past visit information in Bsmark by going to Visits < Visit Summaries. [employer]sm[employer] questions? Call [phone] for help.   [employer]smark is NOT to be used for urge

## (undated) NOTE — MR AVS SNAPSHOT
[facility] [employer] [place] [address] [phone]               Thank you for choosing us for your health care visit with Rupali Martin MD.  We are glad to serve you and happy to provide you with this summary of your v Generic drug:  sucralfate   TAKE 10ML BY MOUTH EVERY 6 HOURS           Fluticasone Propionate 50 MCG/ACT Susp   2 sprays by Each Nare route daily.    Commonly known as:  FLONASE           hydrochlorothiazide 25 MG Tabs   TAKE 1 TABLET (25 MG TOTAL) BY MOUTH

## (undated) NOTE — LETTER
11/21/2017              Fabi Lees        [address]        [facility] [place] [address]         ABOVE PATIENT WAS SEEN AND EXAMINED AT MY OFFICE TODAY FOR A PRE-OPERATIVE PHYSICAL.   SHE HAS A SCHEDULED PLASTIC SURGERY ON 12/13/17 (EXCESS SKIN RE

## (undated) NOTE — MR AVS SNAPSHOT
[address]                Thank you for choosing us for your health care visit with Robert Molina MD.  We are glad to serve you and happy to provide you with this summary of your v CARAFATE 1 GM/10ML Susp   Generic drug:  sucralfate   TAKE 10ML BY MOUTH EVERY 6 HOURS           cetirizine 10 MG Tabs   Take 10 mg by mouth daily.    Commonly known as:  ZYRTEC           Clarithromycin  MG Tb24   Take 2 tablets (1,000 mg total) by m discharge instructions in Deeplink[employer] by going to Visits < Admission Summaries. If you've been to the Emergency Department or your doctor's office, you can view your past visit information in Deeplinkhart by going to Visits < Visit Summaries. [employer] questions?

## (undated) NOTE — ED AVS SNAPSHOT
Elliott Adhikari   MRN: [id number]    Department:  [facility] Emergency Department   Date of Visit:  6/21/2019           Disclosure     Insurance plans vary and the physician(s) referred by the ER may not be covered by your plan.  Please contact tell this physician (or your personal doctor if your instructions are to return to your personal doctor) about any new or lasting problems. The primary care or specialist physician will see patients referred from the [facility] Emergency Department.  Sukhjinder Jimenez

## (undated) NOTE — MR AVS SNAPSHOT
[facility]  [phone]               Thank you for choosing us for your health care visit with Josias Yao MD.  We are glad to serve you and happy to provide you with this summar · Thick mucus draining down the back of the throat (postnasal drainage)  · Loss of smell  · Fever  · Cough  · Sore throat  Diagnosing chronic sinusitis  Your doctor will ask about your symptoms and health history.  The doctor will look at your nose and face Mar 09, 2017  2:30 PM   Hospital Surgery with Nazario Trinidad MD   [address] [facility] [address])    [address] [place] [address]               Mar 11, 2017  8:15 AM   Exam - Established with Kesha Flanagan MD [address] [facility] [address]    Hours:  24-hours Phone:  [phone]    - Clarithromycin  MG Tb24            Follow-up Instructions     Return in about 4 weeks (around 3/17/2017).          MyChart     Visit Websand  You can access your MyChart to mo

## (undated) NOTE — MR AVS SNAPSHOT
[facility] [employer] [place] [address] [phone]               Thank you for choosing us for your health care visit with Cayden Farmer MD.  We are glad to serve you and happy to provide you with this summary of you Clarithromycin  MG Tb24   Take 2 tablets (1,000 mg total) by mouth daily. With food. Commonly known as:  BIAXIN XL           Fluticasone Propionate 50 MCG/ACT Susp   2 sprays by Each Nare route daily.    Commonly known as:  FLONASE           hydroc

## (undated) NOTE — ED AVS SNAPSHOT
[facility] Emergency Department    [address]     [address] [place] [address]    Phone:  [phone]    Fax:  [phone]           Aneesh Yunioralba   MRN: [id number]    Department:  [facility] Emergency Department   Date of Visit:  1 01/05/2017  22:45 ketorolac tromethamine (TORADOL) 30 MG/ML injection 30 mg 30 mg                Admin Date Administration Dose                   01/05/2017  22:45 Metoclopramide HCl (REGLAN) injection 10 mg 10 mg                Admin Date Adm to a primary care or a specialist physician for a follow-up visit, please tell this physician (or your personal doctor if your instructions are to return to your personal doctor) about any new or lasting problems.  The primary care or specialist physician m Ritu Garcia [facility] [address] ([address] [place] [address]) [phone]   [facility] [address] ([facility]) [address] [facility] [address] [address]  ([place] [address]) [phone]                Additional Information

## (undated) NOTE — MR AVS SNAPSHOT
[address]  [phone]               Thank you for choosing us for your health care visit with Josias Rodgers MD.  We are glad to serve you and happy to provide you with this summar requires a prior authorization for a diagnostic test (such as MRI, MRA, CT, or Ultrasound), please do not schedule your exam until you have received the authorization number.  You may be held responsible for payment in full if proper authorization is not ac Your doctor will ask about your symptoms and health history. The doctor will look at your nose and face. You may have imaging studies such as an X-ray or CT scan of the sinuses. Your doctor may also take a sample of the drainage to check for bacteria.  You This list is accurate as of: 3/11/17  8:59 AM.  Always use your most recent med list.                BuPROPion HCl 100 MG Tabs   Take 1 tablet (100 mg total) by mouth 2 (two) times daily.    Commonly known as:  Jazlyn Rang 1 GM/10ML Susp

## (undated) NOTE — Clinical Note
I saw [facility] in the DEPARTMENT OF [facility] today for Acute non-recurrent ethmoidal sinusitis  (primary encounter diagnosis). she was treated with doxycycline. [facility] will follow up with you if no better or as needed.  Thank you for the opportun

## (undated) NOTE — LETTER
Date & Time: 12/10/2024, 1:58 PM  Patient: Fabi Lees  Encounter Provider(s):    Christa Hernandez APRN       To Whom It May Concern:    Fabi Lees was seen and treated in our department on 12/10/2024. She can return to work on 12/11/2024.    If you have any questions or concerns, please do not hesitate to call.    HELIO Woods  Physician/APC Signature

## (undated) NOTE — Clinical Note
After bariatric surgery, patients can not take ER, SR, or LA meds since they don't have enough acids in their stomachs to break down the pill. I will switch her back to regular release wellbutrin and increase the dose to 100 mg TID.  Thanks Martin [place]

## (undated) NOTE — LETTER
[facility] [address]  [address] [place] [address]  Dept: [phone]  Dept Fax: [phone]         October 24, 2018    Patient: Elliott Adhikari   YOB: 1990   Date of Visit: 10/24/2018

## (undated) NOTE — ED AVS SNAPSHOT
Diallo Beltrán   MRN: [id number]    Department:  [facility] Emergency Department   Date of Visit:  2/19/2018           Disclosure     Insurance plans vary and the physician(s) referred by the ER may not be covered by your plan.  Please conta CARE PHYSICIAN AT ONCE OR RETURN IMMEDIATELY TO THE EMERGENCY DEPARTMENT. If you have been prescribed any medication(s), please fill your prescription right away and begin taking the medication(s) as directed.   If you believe that any of the medications

## (undated) NOTE — MR AVS SNAPSHOT
[address] [address]  [phone]               Thank you for choosing us for your health care visit with Raza Lutz MD.  We are glad to serve you and happy to provide you with * BuPROPion HCl ER (SR) 150 MG Tb12   Take 1 tablet (150 mg total) by mouth 2 (two) times daily. What changed: You were already taking a medication with the same name, and this prescription was added. Make sure you understand how and when to take each. If you've recently had a stay at the Hospital you can access your discharge instructions in [employer] by going to Visits < Admission Summaries.  If you've been to the Emergency Department or your doctor's office, you can view your past visit information in My

## (undated) NOTE — MR AVS SNAPSHOT
[address] [facility]  [address] [phone]               Thank you for choosing us for your health care visit with Edith Bain MD.  We are glad to serve you and happy to provide you with th breastfeeding for 24-48 hours after the procedure. IF A CHILD is scheduled for this procedure, parents should be advised that they will not be able to accompany the child in the testing room.  Parents will remain in the MRI waiting area and be reunite Take 1 tablet (1 mg total) by mouth 2 (two) times daily as needed for Anxiety (OR SLEEP). Commonly known as:  ATIVAN           MULTIVITAMIN OR   Take by mouth.            * Pantoprazole Sodium 40 MG Tbec   Take 40 mg by mouth every morning before breakfas

## (undated) NOTE — LETTER
3/14/2017          [address]    Dear Deborah Valentine,       Here are the biopsy/pathology findings from your recent EGD (Upper  Endoscopy) and endoscopic ultrasoundk:  1. Gallstones  2.   Dilated bile duct without e

## (undated) NOTE — MR AVS SNAPSHOT
[address]  [phone]               Thank you for choosing us for your health care visit with HELIO Salazar.   We are glad to serve you and happy to provide you with this summary of Cyclobenzaprine HCl 10 MG Tabs   Take 1 tablet (10 mg total) by mouth nightly as needed for Muscle spasms. Commonly known as:  cyclobenzaprine           Fluticasone Propionate 50 MCG/ACT Susp   2 sprays by Each Nare route daily.    Commonly known as:  FL

## (undated) NOTE — ED AVS SNAPSHOT
[facility] Emergency Department    [address]     [place] [address]    Phone:  [phone]    Fax:  [phone]           Jaimie Handy   MRN: [id number]    Department:  [facility] Emergency Department   Date of Visit:  4/1/ and Class Registration line at [phone] or find a doctor online by visiting [email or web address].    IF THERE IS ANY CHANGE OR WORSENING OF YOUR CONDITION, CALL YOUR PRIMARY CARE PHYSICIAN AT ONCE OR RETURN IMMEDIATELY TO [address] [place] [address].     If

## (undated) NOTE — ED AVS SNAPSHOT
Ernie Jewell   MRN: [id number]    Department:  [facility] Emergency Department   Date of Visit:  10/26/2018           Disclosure     Insurance plans vary and the physician(s) referred by the ER may not be covered by your plan.  Please cont CARE PHYSICIAN AT ONCE OR RETURN IMMEDIATELY TO THE EMERGENCY DEPARTMENT. If you have been prescribed any medication(s), please fill your prescription right away and begin taking the medication(s) as directed.   If you believe that any of the medications

## (undated) NOTE — MR AVS SNAPSHOT
[facility]  [address] [place] [id number] [phone]               Thank you for choosing us for your health care visit with Yessenia Wade MD.  We are glad to serve you and happy to provide you with this summary of your visit. sucralfate 1 GM/10ML Susp   Take 1 g by mouth 4 (four) times daily before meals and nightly. What changed:  Another medication with the same name was removed. Continue taking this medication, and follow the directions you see here.    Commonly known as:

## (undated) NOTE — ED AVS SNAPSHOT
Kylah Mckeon   MRN: [id number]    Department:  [facility] Emergency Department   Date of Visit:  9/27/2017           Disclosure     Insurance plans vary and the physician(s) referred by the ER may not be covered by your plan.  Please contact CARE PHYSICIAN AT ONCE OR RETURN IMMEDIATELY TO THE EMERGENCY DEPARTMENT. If you have been prescribed any medication(s), please fill your prescription right away and begin taking the medication(s) as directed.   If you believe that any of the medications

## (undated) NOTE — LETTER
Date: 6/18/2018    Patient Name: Yumiko Montoya          To Whom it may concern: This letter has been written at the patient's request. The above patient was seen at the [facility] for treatment of a medical condition.     This patient